# Patient Record
Sex: FEMALE | Race: WHITE | Employment: FULL TIME | URBAN - METROPOLITAN AREA
[De-identification: names, ages, dates, MRNs, and addresses within clinical notes are randomized per-mention and may not be internally consistent; named-entity substitution may affect disease eponyms.]

---

## 2020-10-22 ENCOUNTER — OFFICE VISIT (OUTPATIENT)
Dept: PRIMARY CARE CLINIC | Age: 60
End: 2020-10-22
Payer: COMMERCIAL

## 2020-10-22 VITALS
HEIGHT: 63 IN | DIASTOLIC BLOOD PRESSURE: 90 MMHG | SYSTOLIC BLOOD PRESSURE: 150 MMHG | TEMPERATURE: 98 F | WEIGHT: 204.4 LBS | HEART RATE: 97 BPM | BODY MASS INDEX: 36.21 KG/M2 | OXYGEN SATURATION: 98 %

## 2020-10-22 PROCEDURE — 99203 OFFICE O/P NEW LOW 30 MIN: CPT | Performed by: STUDENT IN AN ORGANIZED HEALTH CARE EDUCATION/TRAINING PROGRAM

## 2020-10-22 PROCEDURE — 90471 IMMUNIZATION ADMIN: CPT | Performed by: STUDENT IN AN ORGANIZED HEALTH CARE EDUCATION/TRAINING PROGRAM

## 2020-10-22 PROCEDURE — 90750 HZV VACC RECOMBINANT IM: CPT | Performed by: STUDENT IN AN ORGANIZED HEALTH CARE EDUCATION/TRAINING PROGRAM

## 2020-10-22 RX ORDER — LEVOTHYROXINE SODIUM 0.15 MG/1
150 TABLET ORAL DAILY
Qty: 90 TABLET | Refills: 1 | Status: SHIPPED | OUTPATIENT
Start: 2020-10-22 | End: 2020-11-23

## 2020-10-22 RX ORDER — TRAMADOL HYDROCHLORIDE 50 MG/1
50 TABLET ORAL EVERY 6 HOURS PRN
COMMUNITY
End: 2022-03-29

## 2020-10-22 RX ORDER — PSYLLIUM HUSK 0.4 G
1000 CAPSULE ORAL DAILY
COMMUNITY

## 2020-10-22 RX ORDER — SENNOSIDES 8.6 MG
4 TABLET ORAL PRN
COMMUNITY

## 2020-10-22 RX ORDER — GINKGO BILOBA 60 MG
2 TABLET ORAL 2 TIMES DAILY
COMMUNITY

## 2020-10-22 RX ORDER — GINKGO BILOBA LEAF EXTRACT 60 MG
CAPSULE ORAL
COMMUNITY

## 2020-10-22 RX ORDER — HYDRALAZINE HYDROCHLORIDE 25 MG/1
25 TABLET, FILM COATED ORAL 3 TIMES DAILY
COMMUNITY
Start: 2020-01-16 | End: 2021-01-25 | Stop reason: SDUPTHER

## 2020-10-22 RX ORDER — ALBUTEROL SULFATE 90 UG/1
2 AEROSOL, METERED RESPIRATORY (INHALATION) EVERY 4 HOURS PRN
COMMUNITY

## 2020-10-22 RX ORDER — NORTRIPTYLINE HYDROCHLORIDE 10 MG/1
10 CAPSULE ORAL NIGHTLY
COMMUNITY
Start: 2019-11-07 | End: 2021-06-25

## 2020-10-22 RX ORDER — MONTELUKAST SODIUM 10 MG/1
10 TABLET ORAL NIGHTLY
COMMUNITY
Start: 2019-11-07 | End: 2021-01-25 | Stop reason: SDUPTHER

## 2020-10-22 RX ORDER — DULOXETIN HYDROCHLORIDE 60 MG/1
60 CAPSULE, DELAYED RELEASE ORAL DAILY
COMMUNITY
End: 2020-10-26

## 2020-10-22 RX ORDER — FLUOXETINE HYDROCHLORIDE 40 MG/1
1 CAPSULE ORAL DAILY
COMMUNITY
End: 2020-11-22 | Stop reason: SDUPTHER

## 2020-10-22 RX ORDER — VALSARTAN 320 MG/1
320 TABLET ORAL DAILY
COMMUNITY
Start: 2019-11-07 | End: 2021-06-25

## 2020-10-22 RX ORDER — AMLODIPINE BESYLATE 10 MG/1
10 TABLET ORAL NIGHTLY
COMMUNITY
Start: 2019-11-07 | End: 2021-01-25 | Stop reason: SDUPTHER

## 2020-10-22 RX ORDER — IBUPROFEN 800 MG/1
800 TABLET ORAL EVERY 8 HOURS PRN
COMMUNITY
End: 2022-03-31

## 2020-10-22 RX ORDER — CYCLOBENZAPRINE HCL 10 MG
10 TABLET ORAL 3 TIMES DAILY PRN
COMMUNITY
End: 2022-09-23 | Stop reason: SDUPTHER

## 2020-10-22 RX ORDER — ESTRADIOL 1 MG/1
TABLET ORAL DAILY
COMMUNITY
End: 2021-09-13

## 2020-10-22 RX ORDER — HYDROXYCHLOROQUINE SULFATE 200 MG/1
TABLET, FILM COATED ORAL DAILY
COMMUNITY
End: 2020-10-26

## 2020-10-22 RX ORDER — GLUCOSAMINE SULFATE 500 MG
500 CAPSULE ORAL DAILY
COMMUNITY

## 2020-10-22 RX ORDER — LANOLIN ALCOHOL/MO/W.PET/CERES
3 CREAM (GRAM) TOPICAL NIGHTLY
COMMUNITY

## 2020-10-22 RX ORDER — LEVOTHYROXINE SODIUM 0.15 MG/1
150 TABLET ORAL
COMMUNITY
Start: 2019-11-07 | End: 2020-10-22

## 2020-10-22 RX ORDER — OMEPRAZOLE 40 MG/1
40 CAPSULE, DELAYED RELEASE ORAL
COMMUNITY
End: 2021-01-25 | Stop reason: SDUPTHER

## 2020-10-22 RX ORDER — LORATADINE 10 MG/1
10 TABLET ORAL DAILY
COMMUNITY

## 2020-10-22 RX ORDER — TERAZOSIN 5 MG/1
CAPSULE ORAL
COMMUNITY
Start: 2020-01-09 | End: 2021-09-13

## 2020-10-22 RX ORDER — CRANBERRY FRUIT EXTRACT 200 MG
CAPSULE ORAL
COMMUNITY

## 2020-10-22 RX ORDER — GABAPENTIN 300 MG/1
300 CAPSULE ORAL EVERY EVENING
COMMUNITY
End: 2021-01-25 | Stop reason: SDUPTHER

## 2020-10-22 SDOH — ECONOMIC STABILITY: TRANSPORTATION INSECURITY
IN THE PAST 12 MONTHS, HAS THE LACK OF TRANSPORTATION KEPT YOU FROM MEDICAL APPOINTMENTS OR FROM GETTING MEDICATIONS?: NO

## 2020-10-22 SDOH — HEALTH STABILITY: MENTAL HEALTH: HOW OFTEN DO YOU HAVE A DRINK CONTAINING ALCOHOL?: NOT ASKED

## 2020-10-22 SDOH — HEALTH STABILITY: MENTAL HEALTH: HOW MANY STANDARD DRINKS CONTAINING ALCOHOL DO YOU HAVE ON A TYPICAL DAY?: 1 OR 2

## 2020-10-22 SDOH — ECONOMIC STABILITY: INCOME INSECURITY: HOW HARD IS IT FOR YOU TO PAY FOR THE VERY BASICS LIKE FOOD, HOUSING, MEDICAL CARE, AND HEATING?: NOT HARD AT ALL

## 2020-10-22 SDOH — ECONOMIC STABILITY: TRANSPORTATION INSECURITY
IN THE PAST 12 MONTHS, HAS LACK OF TRANSPORTATION KEPT YOU FROM MEETINGS, WORK, OR FROM GETTING THINGS NEEDED FOR DAILY LIVING?: NO

## 2020-10-22 SDOH — ECONOMIC STABILITY: FOOD INSECURITY: WITHIN THE PAST 12 MONTHS, YOU WORRIED THAT YOUR FOOD WOULD RUN OUT BEFORE YOU GOT MONEY TO BUY MORE.: NEVER TRUE

## 2020-10-22 SDOH — ECONOMIC STABILITY: FOOD INSECURITY: WITHIN THE PAST 12 MONTHS, THE FOOD YOU BOUGHT JUST DIDN'T LAST AND YOU DIDN'T HAVE MONEY TO GET MORE.: NEVER TRUE

## 2020-10-22 ASSESSMENT — PATIENT HEALTH QUESTIONNAIRE - PHQ9
1. LITTLE INTEREST OR PLEASURE IN DOING THINGS: 0
SUM OF ALL RESPONSES TO PHQ9 QUESTIONS 1 & 2: 0
SUM OF ALL RESPONSES TO PHQ QUESTIONS 1-9: 0
SUM OF ALL RESPONSES TO PHQ QUESTIONS 1-9: 0
2. FEELING DOWN, DEPRESSED OR HOPELESS: 0
SUM OF ALL RESPONSES TO PHQ QUESTIONS 1-9: 0

## 2020-10-22 NOTE — PROGRESS NOTES
10/22/2020    Andria Mukherjee (:  1960) is a 62 y.o. female, here for evaluation of the following medical concerns:    HPI    History of thyroid Cancer  Thyroid removed and on synthroid and needs refill  Dry skin, fatigue  TSH checked more than 6 months ago. Synthroid 150mg daily  Denies any neck pain  No constipation/diarrhea  No palpitations    HTN  norvasc 10mg daily  vaalsartan 320mg evening  Hydralazine 25 mgTID  Denies any chest pain, shortness of breath headaches, vision changes, lower extremity edema, orthopnea, or dyspnea on exertion    Fibromyalgia  stable  Tramadol, flexeril  Hydralazine 25 mg day  Has tried Cymbalta 60mg daily not taking anymore    Menopause   Estrace 1 mg daily    Hysterectomy   Just got flu vaccine, already had uearly mamomgram and colonospkcy and all normal     Denies any smoking, drug use, alcohol use  Denies any significant past medical history other than what is listed above, no significant family history, no other surgeries    Review of Systems   Constitutional: Negative for chills and fever. HENT: Negative for congestion, rhinorrhea and sore throat. Eyes: Negative for visual disturbance. Respiratory: Negative for cough and shortness of breath. Cardiovascular: Negative for chest pain. Gastrointestinal: Negative for diarrhea, nausea and vomiting. Genitourinary: Negative for dysuria. Skin: Negative for rash. Allergic/Immunologic: Negative for environmental allergies. Neurological: Negative for headaches. Prior to Visit Medications    Medication Sig Taking?  Authorizing Provider   albuterol sulfate HFA (PROAIR HFA) 108 (90 Base) MCG/ACT inhaler Inhale 2 puffs into the lungs every 4 hours as needed Yes Historical Provider, MD   amLODIPine (NORVASC) 10 MG tablet Take 10 mg by mouth nightly Yes Historical Provider, MD   [facility] 500-0.2 MG TABS Take by mouth Yes Historical Provider, MD   Calcium Carb-Cholecalciferol 500-600 MG-UNIT TABS Take by mouth every evening Yes Historical Provider, MD   vitamin D (VITAMIN D-1000 MAX ST) 25 MCG (1000 UT) TABS tablet Take 1,000 Units by mouth daily Yes Historical Provider, MD   Cranberry 200 MG CAPS Take by mouth Yes Historical Provider, MD   cyclobenzaprine (FLEXERIL) 10 MG tablet Take 10 mg by mouth 3 times daily as needed Yes Historical Provider, MD   estradiol (ESTRACE) 1 MG tablet daily Yes Historical Provider, MD   Ginkgo 60 MG TABS Take 2 tablets by mouth 2 times daily Yes Historical Provider, MD   Ginseng 100 MG CAPS Take by mouth Yes Historical Provider, MD   Glucosamine 500 MG CAPS Take 500 mg by mouth daily Yes Historical Provider, MD   hydrALAZINE (APRESOLINE) 25 MG tablet Take 25 mg by mouth 3 times daily Yes Historical Provider, MD   ibuprofen (ADVIL;MOTRIN) 800 MG tablet Take 800 mg by mouth every 8 hours as needed Yes Historical Provider, MD   loratadine (CLARITIN) 10 MG tablet Take 10 mg by mouth daily Yes Historical Provider, MD   melatonin 3 MG TABS tablet Take 3 mg by mouth nightly Yes Historical Provider, MD   montelukast (SINGULAIR) 10 MG tablet Take 10 mg by mouth nightly Yes Historical Provider, MD   omeprazole (PRILOSEC) 40 MG delayed release capsule Take 40 mg by mouth every morning (before breakfast) Yes Historical Provider, MD   senna (SENOKOT) 8.6 MG TABS tablet Take 4 tablets by mouth nightly Yes Historical Provider, MD   traMADol (ULTRAM) 50 MG tablet Take 50 mg by mouth every 6 hours as needed. Yes Historical Provider, MD   valsartan (DIOVAN) 320 MG tablet Take 320 mg by mouth daily Yes Historical Provider, MD   FLUoxetine (PROZAC) 40 MG capsule Take 1 tablet by mouth daily Yes Historical Provider, MD   gabapentin (NEURONTIN) 300 MG capsule Take 300 mg by mouth every evening.  Yes Historical Provider, MD   Flaxseed, Linseed, (FLAX SEED OIL PO) Take by mouth daily Yes Historical Provider, MD   levothyroxine (SYNTHROID) 150 MCG tablet Take 1 tablet by mouth daily Yes violence     Fear of current or ex partner: Not on file     Emotionally abused: Not on file     Physically abused: Not on file     Forced sexual activity: Not on file   Other Topics Concern    Not on file   Social History Narrative    Not on file        No family history on file. Vitals:    10/22/20 1520 10/22/20 1542   BP: (!) 142/90 (!) 150/90   Site: Right Upper Arm Right Upper Arm   Position: Sitting Sitting   Cuff Size: Large Adult Large Adult   Pulse: 97    Temp: 98 °F (36.7 °C) 98 °F (36.7 °C)   SpO2: 98%    Weight: 204 lb 6.4 oz (92.7 kg)    Height: 5' 2.5\" (1.588 m)      Estimated body mass index is 36.79 kg/m² as calculated from the following:    Height as of this encounter: 5' 2.5\" (1.588 m). Weight as of this encounter: 204 lb 6.4 oz (92.7 kg). Physical Exam  Vitals signs reviewed. Constitutional:       General: She is not in acute distress. Appearance: Normal appearance. She is not ill-appearing. HENT:      Head: Normocephalic and atraumatic. Right Ear: Tympanic membrane, ear canal and external ear normal.      Left Ear: Tympanic membrane, ear canal and external ear normal.      Mouth/Throat:      Mouth: Mucous membranes are moist.      Pharynx: Oropharynx is clear. No oropharyngeal exudate. Eyes:      General: No scleral icterus. Right eye: No discharge. Left eye: No discharge. Extraocular Movements: Extraocular movements intact. Conjunctiva/sclera: Conjunctivae normal.   Neck:      Musculoskeletal: Neck supple. No muscular tenderness. Cardiovascular:      Rate and Rhythm: Normal rate and regular rhythm. Pulses: Normal pulses. Heart sounds: Normal heart sounds. No murmur. No gallop. Pulmonary:      Effort: Pulmonary effort is normal.      Breath sounds: Normal breath sounds. No wheezing, rhonchi or rales. Abdominal:      General: Abdomen is flat. Bowel sounds are normal. There is no distension. Palpations: Abdomen is soft.  There is no mass. Musculoskeletal: Normal range of motion. Lymphadenopathy:      Cervical: No cervical adenopathy. Skin:     General: Skin is warm. Capillary Refill: Capillary refill takes less than 2 seconds. Neurological:      General: No focal deficit present. Mental Status: She is alert. Cranial Nerves: No cranial nerve deficit. Psychiatric:         Mood and Affect: Mood normal.         ASSESSMENT/PLAN:    41-year-old with history of thyroid cancer status post thyroidectomy presenting with some fatigue but otherwise stable. All other comorbidities stable as of right now no changes in medications. 1. Hx of thyroid cancer    - COMPREHENSIVE METABOLIC PANEL; Future  - CBC WITH AUTO DIFFERENTIAL; Future  - TSH WITH REFLEX TO FT4; Future  - Lipid Panel; Future  - HEPATITIS C ANTIBODY; Future    2. Need for shingles vaccine    - zoster recombinant adjuvanted vaccine [facility]) injection 50 mcg  - Zoster recombinant ([address] [place] [address] [address] [place])    3. Benign essential HTN      4. Menopausal hot flushes      5. Fibromyalgia      HM  Patient states she is up-to-date on Pap smear, mammograms and colon cancer screening    Return if symptoms worsen or fail to improve. An  electronic signature was used to authenticate this note.     --Frankie Handy MD on 10/26/2020 at 11:04 AM

## 2020-10-26 PROBLEM — N95.1 MENOPAUSAL HOT FLUSHES: Status: ACTIVE | Noted: 2020-10-26

## 2020-10-26 PROBLEM — Z85.850 HX OF THYROID CANCER: Status: ACTIVE | Noted: 2020-10-26

## 2020-10-26 PROBLEM — I10 BENIGN ESSENTIAL HTN: Status: ACTIVE | Noted: 2020-10-26

## 2020-10-26 PROBLEM — M79.7 FIBROMYALGIA: Status: ACTIVE | Noted: 2020-10-26

## 2020-10-26 ASSESSMENT — ENCOUNTER SYMPTOMS
SHORTNESS OF BREATH: 0
VOMITING: 0
COUGH: 0
RHINORRHEA: 0
SORE THROAT: 0
NAUSEA: 0
DIARRHEA: 0

## 2020-11-18 DIAGNOSIS — Z85.850 HX OF THYROID CANCER: ICD-10-CM

## 2020-11-18 LAB
A/G RATIO: 2.2 (ref 1.1–2.2)
ALBUMIN SERPL-MCNC: 4.7 G/DL (ref 3.4–5)
ALP BLD-CCNC: 113 U/L (ref 40–129)
ALT SERPL-CCNC: 53 U/L (ref 10–40)
ANION GAP SERPL CALCULATED.3IONS-SCNC: 11 MMOL/L (ref 3–16)
AST SERPL-CCNC: 43 U/L (ref 15–37)
BASOPHILS ABSOLUTE: 0 K/UL (ref 0–0.2)
BASOPHILS RELATIVE PERCENT: 0.8 %
BILIRUB SERPL-MCNC: 0.5 MG/DL (ref 0–1)
BUN BLDV-MCNC: 13 MG/DL (ref 7–20)
CALCIUM SERPL-MCNC: 9.5 MG/DL (ref 8.3–10.6)
CHLORIDE BLD-SCNC: 103 MMOL/L (ref 99–110)
CHOLESTEROL, TOTAL: 251 MG/DL (ref 0–199)
CO2: 29 MMOL/L (ref 21–32)
CREAT SERPL-MCNC: 0.6 MG/DL (ref 0.6–1.1)
EOSINOPHILS ABSOLUTE: 0.2 K/UL (ref 0–0.6)
EOSINOPHILS RELATIVE PERCENT: 3.9 %
GFR AFRICAN AMERICAN: >60
GFR NON-AFRICAN AMERICAN: >60
GLOBULIN: 2.1 G/DL
GLUCOSE BLD-MCNC: 116 MG/DL (ref 70–99)
HCT VFR BLD CALC: 42.8 % (ref 36–48)
HDLC SERPL-MCNC: 80 MG/DL (ref 40–60)
HEMOGLOBIN: 14 G/DL (ref 12–16)
HEPATITIS C ANTIBODY INTERPRETATION: NORMAL
LDL CHOLESTEROL CALCULATED: 153 MG/DL
LYMPHOCYTES ABSOLUTE: 1.7 K/UL (ref 1–5.1)
LYMPHOCYTES RELATIVE PERCENT: 32.6 %
MCH RBC QN AUTO: 30 PG (ref 26–34)
MCHC RBC AUTO-ENTMCNC: 32.6 G/DL (ref 31–36)
MCV RBC AUTO: 91.9 FL (ref 80–100)
MONOCYTES ABSOLUTE: 0.3 K/UL (ref 0–1.3)
MONOCYTES RELATIVE PERCENT: 6.1 %
NEUTROPHILS ABSOLUTE: 2.9 K/UL (ref 1.7–7.7)
NEUTROPHILS RELATIVE PERCENT: 56.6 %
PDW BLD-RTO: 13.5 % (ref 12.4–15.4)
PLATELET # BLD: 327 K/UL (ref 135–450)
PMV BLD AUTO: 9.1 FL (ref 5–10.5)
POTASSIUM SERPL-SCNC: 4.7 MMOL/L (ref 3.5–5.1)
RBC # BLD: 4.66 M/UL (ref 4–5.2)
SODIUM BLD-SCNC: 143 MMOL/L (ref 136–145)
T4 FREE: 1.1 NG/DL (ref 0.9–1.8)
TOTAL PROTEIN: 6.8 G/DL (ref 6.4–8.2)
TRIGL SERPL-MCNC: 92 MG/DL (ref 0–150)
TSH REFLEX FT4: 8.53 UIU/ML (ref 0.27–4.2)
VLDLC SERPL CALC-MCNC: 18 MG/DL
WBC # BLD: 5.1 K/UL (ref 4–11)

## 2020-11-19 NOTE — TELEPHONE ENCOUNTER
Medication:   Requested Prescriptions     Pending Prescriptions Disp Refills    FLUoxetine (PROZAC) 40 MG capsule 30 capsule 2     Sig: Take by mouth daily        Last Filled:      Patient Phone Number: [phone] (home)     Last appt: 10/22/2020   Next appt: Visit date not found    Last OARRS: No flowsheet data found. Preferred Pharmacy:   [facility] [address], [address]  [address] [place] [address]  Phone: [phone] Fax: [phone]    [facility], [address] [facility] [address]. Yesica Marco [phone] - F [phone]  [address] [address]  Phone: [phone] Fax: [phone]

## 2020-11-19 NOTE — TELEPHONE ENCOUNTER
Pt needs a refill for Prozach 40 MA     [facility] [address], [address] - F [phone]    And the pt was trying to speak to Dr. Christina Granda or her assistance, but couldn't find any of them, please call her back

## 2020-11-22 RX ORDER — FLUOXETINE HYDROCHLORIDE 40 MG/1
40 CAPSULE ORAL DAILY
Qty: 90 CAPSULE | Refills: 2 | Status: SHIPPED | OUTPATIENT
Start: 2020-11-22 | End: 2021-01-26 | Stop reason: SDUPTHER

## 2020-11-23 RX ORDER — LEVOTHYROXINE SODIUM 175 UG/1
175 TABLET ORAL DAILY
Qty: 90 TABLET | Refills: 1 | Status: SHIPPED | OUTPATIENT
Start: 2020-11-23 | End: 2021-01-26 | Stop reason: SDUPTHER

## 2021-01-25 NOTE — TELEPHONE ENCOUNTER
Sherry Dill from [address] [place] [address] called and is requesting Rxs below:    levothyroxine (SYNTHROID) 175 MCG tablet    FLUoxetine (PROZAC) 40 MG capsule    amLODIPine (NORVASC) 10 MG tablet    Medication  estradiol (ESTRACE) 1 MG tablet [9967]  estradiol (ESTRACE) 1 MG tablet    montelukast (SINGULAIR) 10 MG tablet    omeprazole (PRILOSEC) 40 MG delayed release capsule     hydrALAZINE (APRESOLINE) 25 MG tablet    gabapentin (NEURONTIN) 300 MG capsule    valsartan (DIOVAN) 320 MG tablet    [address] [facility]  [phone]

## 2021-01-26 RX ORDER — AMLODIPINE BESYLATE 10 MG/1
10 TABLET ORAL NIGHTLY
Qty: 30 TABLET | Refills: 3 | Status: SHIPPED | OUTPATIENT
Start: 2021-01-26 | End: 2021-03-07 | Stop reason: SDUPTHER

## 2021-01-26 RX ORDER — ESTRADIOL 1 MG/1
1 TABLET ORAL DAILY
Qty: 21 TABLET | Refills: 0 | OUTPATIENT
Start: 2021-01-26

## 2021-01-26 RX ORDER — OMEPRAZOLE 40 MG/1
40 CAPSULE, DELAYED RELEASE ORAL
Qty: 30 CAPSULE | Refills: 3 | Status: SHIPPED | OUTPATIENT
Start: 2021-01-26 | End: 2021-03-07 | Stop reason: SDUPTHER

## 2021-01-26 RX ORDER — GABAPENTIN 300 MG/1
300 CAPSULE ORAL EVERY EVENING
Qty: 90 CAPSULE | Refills: 0 | Status: SHIPPED | OUTPATIENT
Start: 2021-01-26 | End: 2021-10-25

## 2021-01-26 RX ORDER — LEVOTHYROXINE SODIUM 175 UG/1
175 TABLET ORAL DAILY
Qty: 90 TABLET | Refills: 1 | Status: SHIPPED | OUTPATIENT
Start: 2021-01-26 | End: 2021-04-17 | Stop reason: SDUPTHER

## 2021-01-26 RX ORDER — FLUOXETINE HYDROCHLORIDE 40 MG/1
40 CAPSULE ORAL DAILY
Qty: 90 CAPSULE | Refills: 2 | Status: SHIPPED | OUTPATIENT
Start: 2021-01-26 | End: 2021-10-25

## 2021-01-26 RX ORDER — HYDRALAZINE HYDROCHLORIDE 25 MG/1
25 TABLET, FILM COATED ORAL 3 TIMES DAILY
Qty: 90 TABLET | Refills: 3 | Status: SHIPPED | OUTPATIENT
Start: 2021-01-26 | End: 2021-06-25

## 2021-01-26 RX ORDER — MONTELUKAST SODIUM 10 MG/1
10 TABLET ORAL NIGHTLY
Qty: 30 TABLET | Refills: 3 | Status: SHIPPED | OUTPATIENT
Start: 2021-01-26 | End: 2021-03-07 | Stop reason: SDUPTHER

## 2021-01-26 NOTE — TELEPHONE ENCOUNTER
She is on estrace, unsure how long she has been on this, she has had hysterectomy. But if she has been on this for 5 years or more then would recommend coming of it.

## 2021-03-08 RX ORDER — AMLODIPINE BESYLATE 10 MG/1
10 TABLET ORAL NIGHTLY
Qty: 30 TABLET | Refills: 3 | Status: SHIPPED | OUTPATIENT
Start: 2021-03-08 | End: 2021-04-17 | Stop reason: SDUPTHER

## 2021-03-08 RX ORDER — OMEPRAZOLE 40 MG/1
40 CAPSULE, DELAYED RELEASE ORAL
Qty: 30 CAPSULE | Refills: 3 | Status: SHIPPED | OUTPATIENT
Start: 2021-03-08 | End: 2021-04-17 | Stop reason: SDUPTHER

## 2021-03-08 RX ORDER — MONTELUKAST SODIUM 10 MG/1
10 TABLET ORAL NIGHTLY
Qty: 30 TABLET | Refills: 3 | Status: SHIPPED | OUTPATIENT
Start: 2021-03-08 | End: 2021-04-17 | Stop reason: SDUPTHER

## 2021-03-08 NOTE — TELEPHONE ENCOUNTER
Medication:   Requested Prescriptions     Pending Prescriptions Disp Refills    amLODIPine (NORVASC) 10 MG tablet 30 tablet 3     Sig: Take 1 tablet by mouth nightly    montelukast (SINGULAIR) 10 MG tablet 30 tablet 3     Sig: Take 1 tablet by mouth nightly    omeprazole (PRILOSEC) 40 MG delayed release capsule 30 capsule 3     Sig: Take 1 capsule by mouth every morning (before breakfast)     Last Filled:  01/26/21    Last appt: 10/22/2020   Next appt: Visit date not found    Last OARRS: No flowsheet data found.

## 2021-04-19 RX ORDER — OMEPRAZOLE 40 MG/1
40 CAPSULE, DELAYED RELEASE ORAL
Qty: 30 CAPSULE | Refills: 3 | Status: SHIPPED | OUTPATIENT
Start: 2021-04-19 | End: 2021-10-25

## 2021-04-19 RX ORDER — LEVOTHYROXINE SODIUM 175 UG/1
175 TABLET ORAL DAILY
Qty: 90 TABLET | Refills: 1 | Status: SHIPPED | OUTPATIENT
Start: 2021-04-19 | End: 2021-09-13 | Stop reason: SDUPTHER

## 2021-04-19 RX ORDER — AMLODIPINE BESYLATE 10 MG/1
10 TABLET ORAL NIGHTLY
Qty: 30 TABLET | Refills: 3 | Status: SHIPPED | OUTPATIENT
Start: 2021-04-19 | End: 2021-07-27

## 2021-04-19 RX ORDER — MONTELUKAST SODIUM 10 MG/1
10 TABLET ORAL NIGHTLY
Qty: 30 TABLET | Refills: 3 | Status: SHIPPED | OUTPATIENT
Start: 2021-04-19 | End: 2021-07-27

## 2021-04-19 NOTE — TELEPHONE ENCOUNTER
Medication:   Requested Prescriptions     Pending Prescriptions Disp Refills    amLODIPine (NORVASC) 10 MG tablet 30 tablet 3     Sig: Take 1 tablet by mouth nightly     Last Filled:  3.8.21    Last appt: 10/22/2020   Next appt: 4/17/2021    Last OARRS: No flowsheet data found.

## 2021-04-19 NOTE — TELEPHONE ENCOUNTER
Medication:   Requested Prescriptions     Pending Prescriptions Disp Refills    omeprazole (PRILOSEC) 40 MG delayed release capsule 30 capsule 3     Sig: Take 1 capsule by mouth every morning (before breakfast)     Last Filled:  3.8.21    Last appt: 10/22/2020   Next appt: 4/17/2021    Last OARRS: No flowsheet data found.

## 2021-04-19 NOTE — TELEPHONE ENCOUNTER
Medication:   Requested Prescriptions     Pending Prescriptions Disp Refills    montelukast (SINGULAIR) 10 MG tablet 30 tablet 3     Sig: Take 1 tablet by mouth nightly     Last Filled:  3.8.21    Last appt: 10/22/2020   Next appt: 4/17/2021    Last OARRS: No flowsheet data found.

## 2021-04-19 NOTE — TELEPHONE ENCOUNTER
Medication:   Requested Prescriptions     Pending Prescriptions Disp Refills    levothyroxine (SYNTHROID) 175 MCG tablet 90 tablet 1     Sig: Take 1 tablet by mouth daily     Last Filled:  1.26.21    Last appt: 10/22/2020   Next appt: Visit date not found    Last Thyroid:   Lab Results   Component Value Date    T4FREE 1.1 11/18/2020

## 2021-06-07 ENCOUNTER — OFFICE VISIT (OUTPATIENT)
Dept: ENDOCRINOLOGY | Age: 61
End: 2021-06-07
Payer: COMMERCIAL

## 2021-06-07 VITALS
HEART RATE: 78 BPM | BODY MASS INDEX: 33.29 KG/M2 | OXYGEN SATURATION: 98 % | WEIGHT: 195 LBS | HEIGHT: 64 IN | DIASTOLIC BLOOD PRESSURE: 103 MMHG | SYSTOLIC BLOOD PRESSURE: 156 MMHG

## 2021-06-07 DIAGNOSIS — E66.9 CLASS 1 OBESITY WITH BODY MASS INDEX (BMI) OF 33.0 TO 33.9 IN ADULT, UNSPECIFIED OBESITY TYPE, UNSPECIFIED WHETHER SERIOUS COMORBIDITY PRESENT: ICD-10-CM

## 2021-06-07 DIAGNOSIS — E89.0 HYPOTHYROIDISM, POSTSURGICAL: ICD-10-CM

## 2021-06-07 DIAGNOSIS — C73 THYROID CANCER (HCC): ICD-10-CM

## 2021-06-07 PROBLEM — E66.811 CLASS 1 OBESITY WITH BODY MASS INDEX (BMI) OF 33.0 TO 33.9 IN ADULT: Status: ACTIVE | Noted: 2021-06-07

## 2021-06-07 PROCEDURE — 99204 OFFICE O/P NEW MOD 45 MIN: CPT | Performed by: INTERNAL MEDICINE

## 2021-06-07 NOTE — PROGRESS NOTES
SUBJECTIVE:  Kaity Helm is a 62 y.o. female who is being evaluated for hypothyroidism. 1. Hypothyroidism, postsurgical  This started in 2015. Patient was diagnosed with thyroid cancer, postsurgical hypothyroidism. The problem has been unchanged. Previous thyroid studies include: TSH and free thyroxine. Patient started medication in 2015. Currently patient is on: levothyroxine. Misses  0 doses a month. Current complaints: denies fatigue, weight changes, heat/cold intolerance, bowel/skin changes or CVS symptoms  Lost 10 lbs intentionally  Surgeon Dr. Abigail Barrera. Not followed by him  No I-131 Tx      2. Thyroid cancer ([facility] [address]  No swelling or lump sensation in the neck area    2/13/2015 Operations/Procedures: Total thyroidectomy, central neck dissection 2/13/15    Doing well s/p total+CND for encapsulated FV PTC [id number] (0/2 LN)  LT4, TSH/Tg/renal today, GOVEA +/- but unlikely unless Tg elevated  Clinical History:    Pre-Operative Diagnosis: Follicular variant of papillary thyroid Ca, right    thyroid nodule as [facility] FNA cytology [id number].    Post-Operative Diagnosis:     Same    Specimen(s) Submitted:   A.  Total Thyroid; B. Right Central Node Dissection        CPT Code(s):   98100 X 2    Office Info:   [id number]        FINAL DIAGNOSIS:        A.  Thyroid, total thyroidectomy:    - Papillary carcinoma, right lobe, completely excised; see synoptic report.        B.  Right central node dissection:    - Two lymph nodes negative for metastatic carcinoma; see synoptic report.        SYNOPTIC REPORT    THYROID GLAND: Resection        Procedure:  Total thyroidectomy        Lymph Node Sampling:  Right central node dissection        Tumor Focality:  Unifocal        Tumor Laterality:  Right lobe        Tumor Size:  Greatest dimension: 1.4 cm (gross examination)        Histologic Type:  Papillary carcinoma, follicular variant, encapsulated,    without capsular invasion        Margins:  Margins uninvolved by mouth every morning (before breakfast) 30 capsule 3    montelukast (SINGULAIR) 10 MG tablet Take 1 tablet by mouth nightly 30 tablet 3    levothyroxine (SYNTHROID) 175 MCG tablet Take 1 tablet by mouth daily 90 tablet 1    FLUoxetine (PROZAC) 40 MG capsule Take 1 capsule by mouth daily 90 capsule 2    gabapentin (NEURONTIN) 300 MG capsule Take 1 capsule by mouth every evening for 90 days. 90 capsule 0    hydrALAZINE (APRESOLINE) 25 MG tablet Take 1 tablet by mouth 3 times daily 90 tablet 3    albuterol sulfate HFA (PROAIR HFA) 108 (90 Base) MCG/ACT inhaler Inhale 2 puffs into the lungs every 4 hours as needed      Brindall Asencio-Chromium Kassandra 500-0.2 MG TABS Take by mouth      Calcium Carb-Cholecalciferol 500-600 MG-UNIT TABS Take by mouth every evening      vitamin D (VITAMIN D-1000 MAX ST) 25 MCG (1000 UT) TABS tablet Take 1,000 Units by mouth daily      Cranberry 200 MG CAPS Take by mouth      cyclobenzaprine (FLEXERIL) 10 MG tablet Take 10 mg by mouth 3 times daily as needed      estradiol (ESTRACE) 1 MG tablet daily      Ginkgo 60 MG TABS Take 2 tablets by mouth 2 times daily      Ginseng 100 MG CAPS Take by mouth      Glucosamine 500 MG CAPS Take 500 mg by mouth daily      ibuprofen (ADVIL;MOTRIN) 800 MG tablet Take 800 mg by mouth every 8 hours as needed      loratadine (CLARITIN) 10 MG tablet Take 10 mg by mouth daily      melatonin 3 MG TABS tablet Take 3 mg by mouth nightly      nortriptyline (PAMELOR) 10 MG capsule Take 10 mg by mouth nightly      senna (SENOKOT) 8.6 MG TABS tablet Take 4 tablets by mouth nightly      terazosin (HYTRIN) 5 MG capsule       traMADol (ULTRAM) 50 MG tablet Take 50 mg by mouth every 6 hours as needed.  valsartan (DIOVAN) 320 MG tablet Take 320 mg by mouth daily      Flaxseed, Linseed, (FLAX SEED OIL PO) Take by mouth daily       No current facility-administered medications for this visit.      Allergies   Allergen Reactions    Zofran [Ondansetron] elongated qt wave    Naproxen      weakness    Penicillins      Unknown     Demerol Hcl [Meperidine] Rash     No family status information on file.        Review of Systems:  Constitutional: no fatigue, no fever, no recent weight gain, has intentional recent weight loss, no changes in appetite  Eyes: no eye pain, no change in vision, no eye redness, no eye irritation, no double vision  Ears, nose, throat: no nasal congestion, no sore throat, no earache, no decrease in hearing, no hoarseness, no dry mouth, no sinus problems, no difficulty swallowing, no neck lumps, no dental problems, no mouth sores, no ringing in ears  Pulmonary: has shortness of breath, no wheezing, has dyspnea on exertion, no cough  Cardiovascular: no chest pain, has lower extremity edema, no orthopnea, no intermittent leg claudication, has palpitations  Gastrointestinal: no abdominal pain, no nausea, no vomiting, no diarrhea, no constipation, no dysphagia, no heartburn, no bloating  Genitourinary: no dysuria, no urinary incontinence, no urinary hesitancy, no urinary frequency, no feelings of urinary urgency, no nocturia  Musculoskeletal: has joint swelling, has joint stiffness, has joint pain, has muscle cramps, has muscle pain  Integument/Breast: no hair loss, no skin rashes, no skin lesions, no itching, has dry skin  Neurological: no numbness, no tingling, no weakness, no confusion, no headaches, no dizziness, no fainting, no tremors, no decrease in memory, no balance problems  Psychiatric: no anxiety, no depression, no insomnia  Hematologic/Lymphatic: no tendency for easy bleeding, no swollen lymph nodes, no tendency for easy bruising  Immunology: has seasonal allergies, no frequent infections, no frequent illnesses  Endocrine: has temperature intolerance    BP (!) 156/103   Pulse 78   Ht 5' 4\" (1.626 m)   Wt 195 lb (88.5 kg)   SpO2 98%   BMI 33.47 kg/m²    Wt Readings from Last 3 Encounters:   06/07/21 195 lb (88.5 kg)   10/22/20 204 lb 6.4 oz (92.7 kg)   01/14/14 200 lb (90.7 kg)     Body mass index is 33.47 kg/m².     OBJECTIVE:  Constitutional: no acute distress, well appearing and well nourished  Psychiatric: oriented to person, place and time, judgement and insight and normal, recent and remote memory and intact and mood and affect are normal  Skin: skin and subcutaneous tissue is normal without mass, normal turgor  Head and Face: examination of head and face revealed no abnormalities  Eyes: no lid or conjunctival swelling, erythema or discharge, pupils are normal, equal, round, reactive to light  Ears/Nose: external inspection of ears and nose revealed no abnormalities, hearing is grossly normal  Oropharynx/Mouth/Face: lips, tongue and gums are normal with no lesions, the voice quality was normal  Neck: neck is supple and symmetric, with midline trachea and no masses, thyroid is not palpable  Lymphatics: normal cervical lymph nodes, normal supraclavicular nodes  Pulmonary: no increased work of breathing or signs of respiratory distress, lungs are clear to auscultation  Cardiovascular: normal heart rate and rhythm, normal S1 and S2, no murmurs and pedal pulses and 2+ bilaterally, No edema  Abdomen: abdomen is soft, non-tender with no masses  Musculoskeletal: normal gait and station and exam of the digits and nails are normal  Neurological: normal coordination and normal general cortical function      Lab Review:    Lab Results   Component Value Date    WBC 5.1 11/18/2020    HGB 14.0 11/18/2020    HCT 42.8 11/18/2020    MCV 91.9 11/18/2020     11/18/2020     Lab Results   Component Value Date     11/18/2020    K 4.7 11/18/2020     11/18/2020    CO2 29 11/18/2020    BUN 13 11/18/2020    CREATININE 0.6 11/18/2020    GLUCOSE 116 11/18/2020    CALCIUM 9.5 11/18/2020    PROT 6.8 11/18/2020    LABALBU 4.7 11/18/2020    BILITOT 0.5 11/18/2020    ALKPHOS 113 11/18/2020    AST 43 11/18/2020    ALT 53 11/18/2020    LABGLOM >60 11/18/2020    GFRAA >60 11/18/2020    AGRATIO 2.2 11/18/2020    GLOB 2.1 11/18/2020     Lab Results   Component Value Date    TSHFT4 8.53 11/18/2020     No results found for: LABA1C  No results found for: EAG  Lab Results   Component Value Date    CHOL 251 11/18/2020     Lab Results   Component Value Date    TRIG 92 11/18/2020     Lab Results   Component Value Date    HDL 80 11/18/2020     Lab Results   Component Value Date    LDLCALC 153 11/18/2020     Lab Results   Component Value Date    LABVLDL 18 11/18/2020     No results found for: CHOLHDLRATIO  No results found for: LABMICR, [id number]  No results found for: VITD25     ASSESSMENT/PLAN:  1. Hypothyroidism, postsurgical  Obtain lab work, then reevaluate  Call for results  Adjust medication dose  Continue levothyroxine 0.17 mg daily  - T3, Free; Future  - T4, Free; Future  - TSH without Reflex; Future  - T3, Free; Future  - T4, Free; Future  - TSH without Reflex; Future    2. Thyroid cancer ([facility] [address]  Obtain lab work, then reevaluate  [id number]  Largest tumor diameter 1.4 cm, encapsulated, follicular variant of papillary thyroid carcinoma  - T3, Free; Future  - T4, Free; Future  - TSH without Reflex; Future  - Anti-Thyroglobulin Antibody; Future  - Thyroglobulin; Future  - T3, Free; Future  - T4, Free; Future  - Anti-Thyroglobulin Antibody; Future  - TSH without Reflex; Future  - Thyroglobulin; Future    3.  Class 1 obesity with body mass index (BMI) of 33.0 to 33.9 in adult, unspecified obesity type, unspecified whether serious comorbidity present  Diet, exercise    Reviewed and/or ordered clinical lab results Yes  Reviewed and/or ordered radiology tests Yes   Reviewed and/or ordered other diagnostic tests No  Discussed test results with performing physician No  Independently reviewed image, tracing, or specimen No  Made a decision to obtain old records No  Reviewed and summarized old records Yes   TSH 0.17-0.04-13.53-4.4-13.11-8.53   Ref Range & Units 5 yr ago Thyroglobulin 1.6 - 59.9 ng/mL <0.2Low         Thyroglobulin Ab 0.0 - 39.0 IU/mL <20.0        Obtained history from other than patient No    Andria Yi was counseled regarding symptoms of thyroid cancer, hypothyroidism diagnosis, course and complications of disease if inadequately treated, side effects of medications, diagnosis, treatment options, and prognosis, risks, benefits, complications, and alternatives of treatment, labs, imaging and other studies and treatment targets and goals, target TSH and thyroid cancer treatment. She understands instructions and counseling. Total time I spent for this encounter 45 minutes    Return in about 3 months (around 9/7/2021) for thyroid problems.     Electronically signed by Ruben Barrientos MD on 6/13/2021 at 5:02 PM

## 2021-06-25 ENCOUNTER — OFFICE VISIT (OUTPATIENT)
Dept: CARDIOLOGY CLINIC | Age: 61
End: 2021-06-25
Payer: COMMERCIAL

## 2021-06-25 VITALS
DIASTOLIC BLOOD PRESSURE: 76 MMHG | BODY MASS INDEX: 33.55 KG/M2 | SYSTOLIC BLOOD PRESSURE: 130 MMHG | WEIGHT: 196.5 LBS | HEIGHT: 64 IN | HEART RATE: 73 BPM

## 2021-06-25 DIAGNOSIS — R07.9 CHEST PAIN, UNSPECIFIED TYPE: ICD-10-CM

## 2021-06-25 DIAGNOSIS — R60.9 EDEMA, UNSPECIFIED TYPE: ICD-10-CM

## 2021-06-25 DIAGNOSIS — R06.02 SOB (SHORTNESS OF BREATH): Primary | ICD-10-CM

## 2021-06-25 DIAGNOSIS — E89.0 HYPOTHYROIDISM, POSTSURGICAL: ICD-10-CM

## 2021-06-25 DIAGNOSIS — R00.2 PALPITATIONS: ICD-10-CM

## 2021-06-25 DIAGNOSIS — C73 THYROID CANCER (HCC): ICD-10-CM

## 2021-06-25 LAB
ANTI-THYROGLOB ABS: <10 IU/ML
T3 FREE: 3 PG/ML (ref 2.3–4.2)
T4 FREE: 1.5 NG/DL (ref 0.9–1.8)
TSH SERPL DL<=0.05 MIU/L-ACNC: 0.27 UIU/ML (ref 0.27–4.2)

## 2021-06-25 PROCEDURE — 93000 ELECTROCARDIOGRAM COMPLETE: CPT | Performed by: INTERNAL MEDICINE

## 2021-06-25 PROCEDURE — 99204 OFFICE O/P NEW MOD 45 MIN: CPT | Performed by: INTERNAL MEDICINE

## 2021-06-25 NOTE — PROGRESS NOTES
62 y.o. here for SOB. Has been sob for months or longer. Has seen a pulmonologist for this and is on asthma medications. Sob is worse exertion, can't hike easily. Has palpitations. No n/v/LH. Has had increasing LE edema starting a few mo ago. Started taking \"over-the-counter\" diuretic, and it seemed to have worked. Did have some chest tightness as well. Tightness worse with exertion, lara steps. No syncope. The pain is pinpoint on the R side of sternum. Random. Usually lasts seconds. Goes away on it's own. Past Medical History:   Diagnosis Date    Carpal tunnel syndrome     Ganglion cyst     Thyroid cancer ([facility] [address] 2021     Social History     Tobacco Use    Smoking status: Former Smoker     Packs/day: 0.50     Years: 10.00     Pack years: 5.00     Quit date:      Years since quittin.5    Smokeless tobacco: Never Used   Substance Use Topics    Alcohol use: Yes     Alcohol/week: 14.0 standard drinks     Types: 14 Glasses of wine per week    Drug use: Never     Allergies   Allergen Reactions    Zofran [Ondansetron]      elongated qt wave    Naproxen      weakness    Penicillins      Unknown     Demerol Hcl [Meperidine] Rash     No family history on file. Review of Systems   General: No fevers, chills, fatigue, or night sweats. No abnormal changes in weight. HEENT: No blurry or decreased vision. No changes in hearing, nasal discharge or sore throat. Cardiovascular: See HPI. No cramping in legs or buttocks when walking. Respiratory: No cough, hemoptysis, or wheezing. No history of asthma. Gastrointestinal: No abdominal pain, hematochezia, melana, or history of GI ulcers. Genito-Urinary: No dysuria or hematuria. No urgency or polyuria. Musculoskeletal: No complaints of joint pain, joint swelling or muscular weakness/soreness. Neurological: No dizziness or headaches. No numbness/tingling, speech problems or weakness. No history of a stroke or TIA.    Psychological: No anxiety or depression  Hematological and Lymphatic: No abnormal bleeding or bruising, blood clots, jaundice. Endocrine: No malaise/lethargy, palpitations, polydipsia/polyuria, temperature intolerance or unexpected weight changes. Skin: No rashes or non-healing ulcers. PE:  Blood pressure 130/76, pulse 73, height 5' 4\" (1.626 m), weight 196 lb 8 oz (89.1 kg). General (appearance): Well devel. No distress  Eyes: Anicteric. EOMI. Neck: No JVD  Ears/Nose/Mouth/Thorat: No cyanosis  CV: RRR. No m/r/g   Respiratory:  Clear b, normal respiratory effort  GI: abd s/nt/nd  Skin: Warm, dry. No rashes  Neuro/Psych: Alert and oriented x 3. Appropriate behavior  Ext:  No c/c. No edema  Pulses:  2+ carotid    Lab Results   Component Value Date    WBC 5.1 11/18/2020    HGB 14.0 11/18/2020    HCT 42.8 11/18/2020    MCV 91.9 11/18/2020     11/18/2020     Lab Results   Component Value Date     11/18/2020    K 4.7 11/18/2020     11/18/2020    CO2 29 11/18/2020    BUN 13 11/18/2020    CREATININE 0.6 11/18/2020    GLUCOSE 116 (H) 11/18/2020    CALCIUM 9.5 11/18/2020    PROT 6.8 11/18/2020    LABALBU 4.7 11/18/2020    BILITOT 0.5 11/18/2020    ALKPHOS 113 11/18/2020    AST 43 (H) 11/18/2020    ALT 53 (H) 11/18/2020    LABGLOM >60 11/18/2020    GFRAA >60 11/18/2020    AGRATIO 2.2 11/18/2020    GLOB 2.1 11/18/2020     No results found for: INR, PROTIME    Lab Results   Component Value Date    CHOL 251 (H) 11/18/2020     Lab Results   Component Value Date    TRIG 92 11/18/2020     Lab Results   Component Value Date    HDL 80 (H) 11/18/2020     Lab Results   Component Value Date    LDLCALC 153 (H) 11/18/2020     Lab Results   Component Value Date    LABVLDL 18 11/18/2020     No results found for: CHOLHDLRATIO    6/25/2021 ECG: NSR    11/2019 Echo [facility]): EF 60-65%. Mild MAC. 2016 (from [facility] note):  Had R and LHC that showed normal coronaies and normal RHC       Current Outpatient Medications:   amLODIPine (NORVASC) 10 MG tablet, Take 1 tablet by mouth nightly, Disp: 30 tablet, Rfl: 3    omeprazole (PRILOSEC) 40 MG delayed release capsule, Take 1 capsule by mouth every morning (before breakfast), Disp: 30 capsule, Rfl: 3    montelukast (SINGULAIR) 10 MG tablet, Take 1 tablet by mouth nightly, Disp: 30 tablet, Rfl: 3    levothyroxine (SYNTHROID) 175 MCG tablet, Take 1 tablet by mouth daily, Disp: 90 tablet, Rfl: 1    FLUoxetine (PROZAC) 40 MG capsule, Take 1 capsule by mouth daily, Disp: 90 capsule, Rfl: 2    gabapentin (NEURONTIN) 300 MG capsule, Take 1 capsule by mouth every evening for 90 days. , Disp: 90 capsule, Rfl: 0    albuterol sulfate HFA (PROAIR HFA) 108 (90 Base) MCG/ACT inhaler, Inhale 2 puffs into the lungs every 4 hours as needed, Disp: , Rfl:     Brindall Asencio-Chromium Kassandra 500-0.2 MG TABS, Take by mouth, Disp: , Rfl:     Calcium Carb-Cholecalciferol 500-600 MG-UNIT TABS, Take by mouth every evening, Disp: , Rfl:     vitamin D (VITAMIN D-1000 MAX ST) 25 MCG (1000 UT) TABS tablet, Take 1,000 Units by mouth daily, Disp: , Rfl:     Cranberry 200 MG CAPS, Take by mouth, Disp: , Rfl:     cyclobenzaprine (FLEXERIL) 10 MG tablet, Take 10 mg by mouth 3 times daily as needed, Disp: , Rfl:     estradiol (ESTRACE) 1 MG tablet, daily, Disp: , Rfl:     Ginkgo 60 MG TABS, Take 2 tablets by mouth 2 times daily, Disp: , Rfl:     Ginseng 100 MG CAPS, Take by mouth, Disp: , Rfl:     Glucosamine 500 MG CAPS, Take 500 mg by mouth daily, Disp: , Rfl:     ibuprofen (ADVIL;MOTRIN) 800 MG tablet, Take 800 mg by mouth every 8 hours as needed, Disp: , Rfl:     loratadine (CLARITIN) 10 MG tablet, Take 10 mg by mouth daily, Disp: , Rfl:     melatonin 3 MG TABS tablet, Take 3 mg by mouth nightly, Disp: , Rfl:     senna (SENOKOT) 8.6 MG TABS tablet, Take 4 tablets by mouth nightly, Disp: , Rfl:     traMADol (ULTRAM) 50 MG tablet, Take 50 mg by mouth every 6 hours as needed. , Disp: , Rfl:    Flaxseed, Linseed, (FLAX SEED OIL PO), Take by mouth daily, Disp: , Rfl:     terazosin (HYTRIN) 5 MG capsule, , Disp: , Rfl:     The 10-year ASCVD risk score (Neetu Dunham, et al., 2013) is: 4.2%    Values used to calculate the score:      Age: 62 years      Sex: Female      Is Non- : No      Diabetic: No      Tobacco smoker: No      Systolic Blood Pressure: 188 mmHg      Is BP treated: Yes      HDL Cholesterol: 80 mg/dL      Total Cholesterol: 251 mg/dL    A/P:  62 y.o. here for COLEMAN and chest pain. 1. SOB (shortness of breath)    2. Chest pain, unspecified type    3. Edema, unspecified type    4. Palpitations      Recs:  -Echo  -Nuc stress  -F/U Ina in 3 mo  -If echo and stress are unrevealing and she still has sx, would get CTA coronaries. Already had a cath from a few years ago w/o disease.       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

## 2021-07-04 LAB — THYROGLOBULIN BY LC-MS/MS, SERUM/PLASMA: <0.5 NG/ML (ref 1.3–31.8)

## 2021-08-13 ENCOUNTER — HOSPITAL ENCOUNTER (OUTPATIENT)
Dept: MAMMOGRAPHY | Age: 61
Discharge: HOME | End: 2021-08-13
Payer: COMMERCIAL

## 2021-08-13 ENCOUNTER — APPOINTMENT (OUTPATIENT)
Dept: NON INVASIVE DIAGNOSTICS | Age: 61
End: 2021-08-13
Payer: COMMERCIAL

## 2021-08-13 VITALS — HEIGHT: 64 IN | WEIGHT: 195 LBS | BODY MASS INDEX: 33.29 KG/M2

## 2021-08-13 DIAGNOSIS — Z12.39 SCREENING BREAST EXAMINATION: ICD-10-CM

## 2021-08-13 PROCEDURE — 77063 BREAST TOMOSYNTHESIS BI: CPT

## 2021-09-13 ENCOUNTER — OFFICE VISIT (OUTPATIENT)
Dept: ENDOCRINOLOGY | Age: 61
End: 2021-09-13
Payer: COMMERCIAL

## 2021-09-13 VITALS
WEIGHT: 196 LBS | RESPIRATION RATE: 16 BRPM | HEIGHT: 64 IN | HEART RATE: 72 BPM | DIASTOLIC BLOOD PRESSURE: 86 MMHG | OXYGEN SATURATION: 97 % | BODY MASS INDEX: 33.46 KG/M2 | SYSTOLIC BLOOD PRESSURE: 142 MMHG | TEMPERATURE: 98 F

## 2021-09-13 DIAGNOSIS — E89.0 HYPOTHYROIDISM, POSTSURGICAL: Primary | ICD-10-CM

## 2021-09-13 DIAGNOSIS — Z78.0 MENOPAUSE: ICD-10-CM

## 2021-09-13 DIAGNOSIS — E66.9 CLASS 1 OBESITY WITH BODY MASS INDEX (BMI) OF 33.0 TO 33.9 IN ADULT, UNSPECIFIED OBESITY TYPE, UNSPECIFIED WHETHER SERIOUS COMORBIDITY PRESENT: ICD-10-CM

## 2021-09-13 DIAGNOSIS — C73 THYROID CANCER (HCC): ICD-10-CM

## 2021-09-13 PROCEDURE — 99214 OFFICE O/P EST MOD 30 MIN: CPT | Performed by: INTERNAL MEDICINE

## 2021-09-13 RX ORDER — LEVOTHYROXINE SODIUM 175 UG/1
175 TABLET ORAL DAILY
Qty: 90 TABLET | Refills: 1 | Status: SHIPPED | OUTPATIENT
Start: 2021-09-13 | End: 2022-03-28

## 2021-09-13 RX ORDER — M-VIT,TX,IRON,MINS/CALC/FOLIC 27MG-0.4MG
1 TABLET ORAL DAILY
COMMUNITY

## 2021-09-13 RX ORDER — DIPHENHYDRAMINE HCL 25 MG
1 TABLET,DISINTEGRATING ORAL
COMMUNITY

## 2021-09-13 NOTE — PROGRESS NOTES
SUBJECTIVE:  Danis Paul is a 62 y.o. female who is being evaluated for hypothyroidism. 1. Hypothyroidism, postsurgical  This started in 2015. Patient was diagnosed with thyroid cancer, postsurgical hypothyroidism. The problem has been unchanged. Previous thyroid studies include: TSH and free thyroxine. Patient started medication in 2015. Currently patient is on: levothyroxine. Misses  0 doses a month. Current complaints: denies fatigue, weight changes, heat/cold intolerance, bowel/skin changes or CVS symptoms. Has nail changes, dry skin. Surgeon Dr. Lio Mccauley. Not followed by him  No I-131 Tx      2. Thyroid cancer ([facility] [address]  No swelling or lump sensation in the neck area    2/13/2015 Operations/Procedures: Total thyroidectomy, central neck dissection 2/13/15    Doing well s/p total+CND for encapsulated FV PTC [id number] (0/2 LN)  LT4, TSH/Tg/renal today, GOVEA +/- but unlikely unless Tg elevated  Clinical History:    Pre-Operative Diagnosis: Follicular variant of papillary thyroid Ca, right    thyroid nodule as [facility] FNA cytology [id number].    Post-Operative Diagnosis:     Same    Specimen(s) Submitted:   A.  Total Thyroid; B. Right Central Node Dissection        CPT Code(s):   31952 X 2    Office Info:   [id number]        FINAL DIAGNOSIS:        A.  Thyroid, total thyroidectomy:    - Papillary carcinoma, right lobe, completely excised; see synoptic report.        B.  Right central node dissection:    - Two lymph nodes negative for metastatic carcinoma; see synoptic report.        SYNOPTIC REPORT    THYROID GLAND: Resection        Procedure:  Total thyroidectomy        Lymph Node Sampling:  Right central node dissection        Tumor Focality:  Unifocal        Tumor Laterality:  Right lobe        Tumor Size:  Greatest dimension: 1.4 cm (gross examination)        Histologic Type:  Papillary carcinoma, follicular variant, encapsulated,    without capsular invasion        Margins:  Margins uninvolved by Allergen Reactions    Zofran [Ondansetron]      elongated qt wave    Naproxen      weakness    Penicillins      Unknown     Demerol Hcl [Meperidine] Rash    Sulfa Antibiotics Nausea And Vomiting     No family status information on file.        Review of Systems:  Constitutional: no fatigue, no fever, no recent weight gain, has intentional recent weight loss, no changes in appetite  Eyes: no eye pain, no change in vision, no eye redness, no eye irritation, no double vision  Ears, nose, throat: no nasal congestion, no sore throat, no earache, no decrease in hearing, no hoarseness, no dry mouth, no sinus problems, no difficulty swallowing, no neck lumps, no dental problems, no mouth sores, no ringing in ears  Pulmonary: has shortness of breath, no wheezing, has dyspnea on exertion, no cough  Cardiovascular: no chest pain, has lower extremity edema, no orthopnea, no intermittent leg claudication, has palpitations  Gastrointestinal: no abdominal pain, no nausea, no vomiting, no diarrhea, no constipation, no dysphagia, no heartburn, no bloating  Genitourinary: no dysuria, no urinary incontinence, no urinary hesitancy, no urinary frequency, no feelings of urinary urgency, no nocturia  Musculoskeletal: has joint swelling, has joint stiffness, has joint pain, has muscle cramps, has muscle pain  Integument/Breast: no hair loss, no skin rashes, no skin lesions, no itching, has dry skin  Neurological: no numbness, no tingling, no weakness, no confusion, no headaches, no dizziness, no fainting, no tremors, no decrease in memory, no balance problems  Psychiatric: no anxiety, no depression, no insomnia  Hematologic/Lymphatic: no tendency for easy bleeding, no swollen lymph nodes, no tendency for easy bruising  Immunology: has seasonal allergies, no frequent infections, no frequent illnesses  Endocrine: has temperature intolerance    BP (!) 142/86   Pulse 72   Temp 98 °F (36.7 °C)   Resp 16   Ht 5' 4\" (1.626 m)   Wt 196 lb (88.9 kg)   LMP  (LMP Unknown)   SpO2 97%   BMI 33.64 kg/m²    Wt Readings from Last 3 Encounters:   09/13/21 196 lb (88.9 kg)   08/13/21 195 lb (88.5 kg)   06/25/21 196 lb 8 oz (89.1 kg)     Body mass index is 33.64 kg/m².     OBJECTIVE:  Constitutional: no acute distress, well appearing and well nourished  Psychiatric: oriented to person, place and time, judgement and insight and normal, recent and remote memory and intact and mood and affect are normal  Skin: skin and subcutaneous tissue is normal without mass, normal turgor  Head and Face: examination of head and face revealed no abnormalities  Eyes: no lid or conjunctival swelling, erythema or discharge, pupils are normal, equal, round, reactive to light  Ears/Nose: external inspection of ears and nose revealed no abnormalities, hearing is grossly normal  Oropharynx/Mouth/Face: lips, tongue and gums are normal with no lesions, the voice quality was normal  Neck: neck is supple and symmetric, with midline trachea and no masses, thyroid is not palpable  Lymphatics: normal cervical lymph nodes, normal supraclavicular nodes  Pulmonary: no increased work of breathing or signs of respiratory distress, lungs are clear to auscultation  Cardiovascular: normal heart rate and rhythm, normal S1 and S2, no murmurs and pedal pulses and 2+ bilaterally, No edema  Abdomen: abdomen is soft, non-tender with no masses  Musculoskeletal: normal gait and station and exam of the digits and nails are normal  Neurological: normal coordination and normal general cortical function      Lab Review:    Lab Results   Component Value Date    WBC 5.1 11/18/2020    HGB 14.0 11/18/2020    HCT 42.8 11/18/2020    MCV 91.9 11/18/2020     11/18/2020     Lab Results   Component Value Date     11/18/2020    K 4.7 11/18/2020     11/18/2020    CO2 29 11/18/2020    BUN 13 11/18/2020    CREATININE 0.6 11/18/2020    GLUCOSE 116 11/18/2020    CALCIUM 9.5 11/18/2020    PROT 6.8 11/18/2020    LABALBU 4.7 11/18/2020    BILITOT 0.5 11/18/2020    ALKPHOS 113 11/18/2020    AST 43 11/18/2020    ALT 53 11/18/2020    LABGLOM >60 11/18/2020    GFRAA >60 11/18/2020    AGRATIO 2.2 11/18/2020    GLOB 2.1 11/18/2020     Lab Results   Component Value Date    TSHFT4 8.53 11/18/2020    TSH 0.15 09/08/2021    FT3 3.2 09/08/2021     No results found for: LABA1C  No results found for: EAG  Lab Results   Component Value Date    CHOL 251 11/18/2020     Lab Results   Component Value Date    TRIG 92 11/18/2020     Lab Results   Component Value Date    HDL 80 11/18/2020     Lab Results   Component Value Date    LDLCALC 153 11/18/2020     Lab Results   Component Value Date    LABVLDL 18 11/18/2020     No results found for: CHOLHDLRATIO  No results found for: LABMICR, [id number]  No results found for: VITD25     ASSESSMENT/PLAN:  1. Hypothyroidism, postsurgical  TSH 0.15  Continue levothyroxine 0.175 mg daily  Adjust the dose next dmitry if indicated  - T3, Free; Future  - T4, Free; Future  - TSH without Reflex; Future    2. Thyroid cancer (HCC)  Thyroglobulin antibody negative   Total thyroidectomy 2015  [id number]  Largest tumor diameter 1.4 cm, encapsulated, follicular variant of papillary thyroid carcinoma  - T3, Free; Future  - T4, Free; Future  - Anti-Thyroglobulin Antibody; Future  - TSH without Reflex; Future  - Thyroglobulin; Future    3. Class 1 obesity with body mass index (BMI) of 33.0 to 33.9 in adult, unspecified obesity type, unspecified whether serious comorbidity present  Diet, exercise    4.  Menopause  No family history of osteoporosis or hip fractures  No steroid use  No smoking  Had toe fracture        Reviewed and/or ordered clinical lab results Yes  Reviewed and/or ordered radiology tests Yes   Reviewed and/or ordered other diagnostic tests No  Discussed test results with performing physician No  Independently reviewed image, tracing, or specimen No  Made a decision to obtain old records No  Reviewed and summarized old records Yes   TSH 0.17-0.04-13.53-4.4-13.11-8.53   Ref Range & Units 5 yr ago   Thyroglobulin 1.6 - 59.9 ng/mL <0.2Low         Thyroglobulin Ab 0.0 - 39.0 IU/mL <20.0        Obtained history from other than patient No    Andrai J Gabriel Hankins was counseled regarding symptoms of thyroid cancer, hypothyroidism diagnosis, course and complications of disease if inadequately treated, side effects of medications, diagnosis, treatment options, and prognosis, risks, benefits, complications, and alternatives of treatment, labs, imaging and other studies and treatment targets and goals, target TSH and thyroid cancer treatment. She understands instructions and counseling. Total time I spent for this encounter 30 minutes    Return in about 4 months (around 1/13/2022) for thyroid problems.     Electronically signed by Gerardo Rubi MD on 9/13/2021 at 4:18 PM

## 2021-10-22 NOTE — TELEPHONE ENCOUNTER
Medication:   Requested Prescriptions     Pending Prescriptions Disp Refills    amLODIPine (NORVASC) 10 MG tablet [Pharmacy Med Name: AMLODIPINE BESYLATE 10MG TABS] 30 tablet 3     Sig: TAKE ONE TABLET BY MOUTH NIGHTLY    montelukast (SINGULAIR) 10 MG tablet [Pharmacy Med Name: MONTELUKAST SODIUM 10MG TABS] 30 tablet 3     Sig: TAKE ONE TABLET BY MOUTH NIGHTLY    omeprazole (PRILOSEC) 40 MG delayed release capsule [Pharmacy Med Name: OMEPRAZOLE 40MG CPDR] 30 capsule 3     Sig: TAKE ONE CAPSULE BY MOUTH EVERY MORNING BEFORE BREAKFAST    FLUoxetine (PROZAC) 40 MG capsule [Pharmacy Med Name: FLUOXETINE HCL 40MG CAPS] 90 capsule 2     Sig: TAKE 1 CAPSULE BY MOUTH ONE TIME A DAY    gabapentin (NEURONTIN) 300 MG capsule [Pharmacy Med Name: GABAPENTIN 300MG CAPS] 90 capsule 0     Sig: TAKE ONE CAPSULE BY MOUTH EVERY EVENING     Last Filled: 7.27.21  4.19.21 1.26.21    Last appt: 10/22/2020   Next appt: Visit date not found    Last OARRS: No flowsheet data found.

## 2021-10-25 RX ORDER — OMEPRAZOLE 40 MG/1
CAPSULE, DELAYED RELEASE ORAL
Qty: 30 CAPSULE | Refills: 0 | Status: SHIPPED | OUTPATIENT
Start: 2021-10-25 | End: 2021-12-06

## 2021-10-25 RX ORDER — GABAPENTIN 300 MG/1
CAPSULE ORAL
Qty: 30 CAPSULE | Refills: 0 | Status: SHIPPED | OUTPATIENT
Start: 2021-10-25 | End: 2022-03-31

## 2021-10-25 RX ORDER — AMLODIPINE BESYLATE 10 MG/1
TABLET ORAL
Qty: 30 TABLET | Refills: 0 | Status: SHIPPED | OUTPATIENT
Start: 2021-10-25 | End: 2021-12-06

## 2021-10-25 RX ORDER — MONTELUKAST SODIUM 10 MG/1
TABLET ORAL
Qty: 30 TABLET | Refills: 0 | Status: SHIPPED | OUTPATIENT
Start: 2021-10-25 | End: 2021-12-06

## 2021-10-25 RX ORDER — FLUOXETINE HYDROCHLORIDE 40 MG/1
CAPSULE ORAL
Qty: 30 CAPSULE | Refills: 0 | Status: SHIPPED | OUTPATIENT
Start: 2021-10-25 | End: 2021-12-06

## 2021-11-01 ENCOUNTER — ANESTHESIA EVENT (OUTPATIENT)
Dept: ENDOSCOPY | Age: 61
End: 2021-11-01
Payer: COMMERCIAL

## 2021-11-02 ENCOUNTER — ANESTHESIA (OUTPATIENT)
Dept: ENDOSCOPY | Age: 61
End: 2021-11-02
Payer: COMMERCIAL

## 2021-11-02 ENCOUNTER — HOSPITAL ENCOUNTER (OUTPATIENT)
Age: 61
Setting detail: OUTPATIENT SURGERY
Discharge: HOME | End: 2021-11-02
Attending: INTERNAL MEDICINE | Admitting: INTERNAL MEDICINE
Payer: COMMERCIAL

## 2021-11-02 VITALS
DIASTOLIC BLOOD PRESSURE: 83 MMHG | HEIGHT: 64 IN | RESPIRATION RATE: 16 BRPM | SYSTOLIC BLOOD PRESSURE: 156 MMHG | BODY MASS INDEX: 33.46 KG/M2 | TEMPERATURE: 98.2 F | WEIGHT: 196 LBS | HEART RATE: 69 BPM | OXYGEN SATURATION: 97 %

## 2021-11-02 VITALS — SYSTOLIC BLOOD PRESSURE: 112 MMHG | DIASTOLIC BLOOD PRESSURE: 71 MMHG | OXYGEN SATURATION: 99 %

## 2021-11-02 DIAGNOSIS — Z12.11 COLON CANCER SCREENING: ICD-10-CM

## 2021-11-02 PROCEDURE — 6360000002 HC RX W HCPCS: Performed by: NURSE ANESTHETIST, CERTIFIED REGISTERED

## 2021-11-02 PROCEDURE — 3609010200 HC COLONOSCOPY ABLATION TUMOR POLYP/OTHER LES: Performed by: INTERNAL MEDICINE

## 2021-11-02 PROCEDURE — 3700000001 HC ADD 15 MINUTES (ANESTHESIA): Performed by: INTERNAL MEDICINE

## 2021-11-02 PROCEDURE — 7100000011 HC PHASE II RECOVERY - ADDTL 15 MIN: Performed by: INTERNAL MEDICINE

## 2021-11-02 PROCEDURE — 3700000000 HC ANESTHESIA ATTENDED CARE: Performed by: INTERNAL MEDICINE

## 2021-11-02 PROCEDURE — 7100000010 HC PHASE II RECOVERY - FIRST 15 MIN: Performed by: INTERNAL MEDICINE

## 2021-11-02 PROCEDURE — 2580000003 HC RX 258: Performed by: ANESTHESIOLOGY

## 2021-11-02 PROCEDURE — 2720000010 HC SURG SUPPLY STERILE: Performed by: INTERNAL MEDICINE

## 2021-11-02 PROCEDURE — 2709999900 HC NON-CHARGEABLE SUPPLY: Performed by: INTERNAL MEDICINE

## 2021-11-02 PROCEDURE — 88305 TISSUE EXAM BY PATHOLOGIST: CPT

## 2021-11-02 RX ORDER — LIDOCAINE HYDROCHLORIDE 20 MG/ML
INJECTION, SOLUTION INTRAVENOUS PRN
Status: DISCONTINUED | OUTPATIENT
Start: 2021-11-02 | End: 2021-11-02 | Stop reason: SDUPTHER

## 2021-11-02 RX ORDER — PROPOFOL 10 MG/ML
INJECTION, EMULSION INTRAVENOUS PRN
Status: DISCONTINUED | OUTPATIENT
Start: 2021-11-02 | End: 2021-11-02 | Stop reason: SDUPTHER

## 2021-11-02 RX ORDER — PROPOFOL 10 MG/ML
INJECTION, EMULSION INTRAVENOUS CONTINUOUS PRN
Status: DISCONTINUED | OUTPATIENT
Start: 2021-11-02 | End: 2021-11-02 | Stop reason: SDUPTHER

## 2021-11-02 RX ORDER — SODIUM CHLORIDE, SODIUM LACTATE, POTASSIUM CHLORIDE, CALCIUM CHLORIDE 600; 310; 30; 20 MG/100ML; MG/100ML; MG/100ML; MG/100ML
INJECTION, SOLUTION INTRAVENOUS CONTINUOUS
Status: DISCONTINUED | OUTPATIENT
Start: 2021-11-02 | End: 2021-11-02 | Stop reason: HOSPADM

## 2021-11-02 RX ADMIN — PROPOFOL 25 MG: 10 INJECTION, EMULSION INTRAVENOUS at 07:35

## 2021-11-02 RX ADMIN — LIDOCAINE HYDROCHLORIDE 35 MG: 20 INJECTION, SOLUTION INTRAVENOUS at 07:42

## 2021-11-02 RX ADMIN — PROPOFOL 125 MCG/KG/MIN: 10 INJECTION, EMULSION INTRAVENOUS at 07:30

## 2021-11-02 RX ADMIN — LIDOCAINE HYDROCHLORIDE 15 MG: 20 INJECTION, SOLUTION INTRAVENOUS at 07:35

## 2021-11-02 RX ADMIN — LIDOCAINE HYDROCHLORIDE 50 MG: 20 INJECTION, SOLUTION INTRAVENOUS at 07:30

## 2021-11-02 RX ADMIN — PROPOFOL 50 MG: 10 INJECTION, EMULSION INTRAVENOUS at 07:42

## 2021-11-02 RX ADMIN — SODIUM CHLORIDE, POTASSIUM CHLORIDE, SODIUM LACTATE AND CALCIUM CHLORIDE: 600; 310; 30; 20 INJECTION, SOLUTION INTRAVENOUS at 07:14

## 2021-11-02 RX ADMIN — PROPOFOL 75 MG: 10 INJECTION, EMULSION INTRAVENOUS at 07:30

## 2021-11-02 ASSESSMENT — PULMONARY FUNCTION TESTS
PIF_VALUE: 1
PIF_VALUE: 0
PIF_VALUE: 1
PIF_VALUE: 1
PIF_VALUE: 0
PIF_VALUE: 1
PIF_VALUE: 0
PIF_VALUE: 1
PIF_VALUE: 0
PIF_VALUE: 1
PIF_VALUE: 0
PIF_VALUE: 1
PIF_VALUE: 0
PIF_VALUE: 1
PIF_VALUE: 1

## 2021-11-02 ASSESSMENT — PAIN - FUNCTIONAL ASSESSMENT: PAIN_FUNCTIONAL_ASSESSMENT: 0-10

## 2021-11-02 ASSESSMENT — PAIN SCALES - GENERAL
PAINLEVEL_OUTOF10: 0

## 2021-11-02 ASSESSMENT — ENCOUNTER SYMPTOMS: SHORTNESS OF BREATH: 1

## 2021-11-02 ASSESSMENT — LIFESTYLE VARIABLES: SMOKING_STATUS: 0

## 2021-11-02 NOTE — ANESTHESIA POSTPROCEDURE EVALUATION
Department of Anesthesiology  Postprocedure Note    Patient: Soo Coyle  MRN: [id number]  YOB: 1960  Date of evaluation: 11/2/2021  Time:  9:13 AM     Procedure Summary     Date: 11/02/21 Room / Location: [facility]    Anesthesia Start: 0725 Anesthesia Stop: 9077    Procedures:       COLONOSCOPY POLYPECTOMY ABLATION      COLONOSCOPY W/ ENDOSCOPIC MUCOSAL RESECTION Diagnosis:       Colon cancer screening      (Colon cancer screening [Z12.11])    Surgeons: Sherlyn Thornton MD Responsible Provider: Rosaline Leger MD    Anesthesia Type: MAC ASA Status: 2          Anesthesia Type: MAC    Mingo Phase I: Mingo Score: 10    Mingo Phase II: Mingo Score: 10    Last vitals: Reviewed and per EMR flowsheets.        Anesthesia Post Evaluation    Patient location during evaluation: bedside  Patient participation: complete - patient participated  Level of consciousness: awake  Pain score: 0  Airway patency: patent  Nausea & Vomiting: no nausea and no vomiting  Complications: no  Cardiovascular status: hemodynamically stable  Respiratory status: acceptable  Hydration status: euvolemic

## 2021-11-02 NOTE — ANESTHESIA PRE PROCEDURE
Department of Anesthesiology  Preprocedure Note       Name:  Jaskaran Welch   Age:  62 y.o.  :  1960                                          MRN:  [id number]         Date:  2021      Surgeon: La Rowe):  Trae Ho MD    Procedure: Procedure(s):  COLONOSCOPY    Medications prior to admission:   Prior to Admission medications    Medication Sig Start Date End Date Taking?  Authorizing Provider   amLODIPine (NORVASC) 10 MG tablet TAKE ONE TABLET BY MOUTH NIGHTLY 10/25/21   Codie Zavala MD   montelukast (SINGULAIR) 10 MG tablet TAKE ONE TABLET BY MOUTH NIGHTLY 10/25/21   Codie Zavala MD   omeprazole (PRILOSEC) 40 MG delayed release capsule TAKE ONE CAPSULE BY MOUTH EVERY MORNING BEFORE BREAKFAST 10/25/21   Codie Zavala MD   FLUoxetine (PROZAC) 40 MG capsule TAKE 1 CAPSULE BY MOUTH ONE TIME A DAY 10/25/21   Codie Zavala MD   gabapentin (NEURONTIN) 300 MG capsule TAKE ONE CAPSULE BY MOUTH EVERY EVENING 10/25/21 11/24/21  Codie Zavala MD   Misc Natural Products (GREEN TEA) TABS Take 1 tablet by mouth    Historical Provider, MD   Multiple Vitamins-Minerals (THERAPEUTIC MULTIVITAMIN-MINERALS) tablet Take 1 tablet by mouth daily    Historical Provider, MD   levothyroxine (SYNTHROID) 175 MCG tablet Take 1 tablet by mouth daily 21   Cosme Bright MD   albuterol sulfate HFA (PROAIR HFA) 108 (90 Base) MCG/ACT inhaler Inhale 2 puffs into the lungs every 4 hours as needed    Historical Provider, MD Vivi Asencio-Chromium Kassandra 500-0.2 MG TABS Take by mouth    Historical Provider, MD   Calcium Carb-Cholecalciferol 500-600 MG-UNIT TABS Take by mouth every evening    Historical Provider, MD   vitamin D (VITAMIN D-1000 MAX ST) 25 MCG (1000 UT) TABS tablet Take 1,000 Units by mouth daily    Historical Provider, MD   Cranberry 200 MG CAPS Take by mouth    Historical Provider, MD   cyclobenzaprine (FLEXERIL) 10 MG tablet Take 10 mg by mouth 3 times daily as needed    Historical Provider, MD Ginkgo 60 MG TABS Take 2 tablets by mouth 2 times daily    Historical Provider, MD   Ginseng 100 MG CAPS Take by mouth    Historical Provider, MD   Glucosamine 500 MG CAPS Take 500 mg by mouth daily    Historical Provider, MD   ibuprofen (ADVIL;MOTRIN) 800 MG tablet Take 800 mg by mouth every 8 hours as needed    Historical Provider, MD   loratadine (CLARITIN) 10 MG tablet Take 10 mg by mouth daily    Historical Provider, MD   melatonin 3 MG TABS tablet Take 3 mg by mouth nightly    Historical Provider, MD   senna (SENOKOT) 8.6 MG TABS tablet Take 4 tablets by mouth as needed     Historical Provider, MD   traMADol (ULTRAM) 50 MG tablet Take 50 mg by mouth every 6 hours as needed. Historical Provider, MD   Flaxseed Linseed, (FLAX SEED OIL PO) Take by mouth daily    Historical Provider, MD       Current medications:    Current Facility-Administered Medications   Medication Dose Route Frequency Provider Last Rate Last Admin    lactated ringers infusion   IntraVENous Continuous Marletta Needle, DO           Allergies:     Allergies   Allergen Reactions    Zofran [Ondansetron]      elongated qt wave    Naproxen      weakness    Penicillins      Unknown     Demerol Hcl [Meperidine] Rash    Sulfa Antibiotics Nausea And Vomiting       Problem List:    Patient Active Problem List   Diagnosis Code    Carpal tunnel syndrome G56.00    Trigger finger, acquired M65.30    Raynaud's disease I73.00    Benign essential HTN I10    Menopausal hot flushes N95.1    Fibromyalgia M79.7    Hx of thyroid cancer Z85.850    Hypothyroidism, postsurgical E89.0    Thyroid cancer ([facility] [address] C73    Class 1 obesity with body mass index (BMI) of 33.0 to 33.9 in adult E66.9, Z68.33    Menopause Z78.0       Past Medical History:        Diagnosis Date    Carpal tunnel syndrome     Ganglion cyst     Thyroid cancer ([facility] [address] 6/7/2021       Past Surgical History:        Procedure Laterality Date    BREAST LUMPECTOMY      CARPAL TUNNEL RELEASE      FOOT SURGERY      GALLBLADDER SURGERY      MOUTH SURGERY      SHOULDER SURGERY         Social History:    Social History     Tobacco Use    Smoking status: Former Smoker     Packs/day: 0.50     Years: 10.00     Pack years: 5.00     Quit date:      Years since quittin.    Smokeless tobacco: Never Used   Substance Use Topics    Alcohol use: Yes     Alcohol/week: 14.0 standard drinks     Types: 14 Glasses of wine per week                                Counseling given: Not Answered      Vital Signs (Current):   Vitals:    21 0654   BP: 137/80   Pulse: 74   Resp: 16   Temp: 98.5 °F (36.9 °C)   TempSrc: Tympanic   SpO2: 96%   Weight: 196 lb (88.9 kg)   Height: 5' 4\" (1.626 m)                                              BP Readings from Last 3 Encounters:   21 137/80   21 (!) 142/86   21 130/76       NPO Status:                                                                                 BMI:   Wt Readings from Last 3 Encounters:   21 196 lb (88.9 kg)   21 196 lb (88.9 kg)   21 195 lb (88.5 kg)     Body mass index is 33.64 kg/m². CBC:   Lab Results   Component Value Date    WBC 5.1 2020    RBC 4.66 2020    HGB 14.0 2020    HCT 42.8 2020    MCV 91.9 2020    RDW 13.5 2020     2020       CMP:   Lab Results   Component Value Date     2020    K 4.7 2020     2020    CO2 29 2020    BUN 13 2020    CREATININE 0.6 2020    GFRAA >60 2020    AGRATIO 2.2 2020    LABGLOM >60 2020    GLUCOSE 116 2020    PROT 6.8 2020    CALCIUM 9.5 2020    BILITOT 0.5 2020    ALKPHOS 113 2020    AST 43 2020    ALT 53 2020       POC Tests: No results for input(s): POCGLU, POCNA, POCK, POCCL, POCBUN, POCHEMO, POCHCT in the last 72 hours. Coags: No results found for: PROTIME, INR, APTT    HCG (If Applicable):  No results found for: PREGTESTUR, PREGSERUM, HCG, HCGQUANT     ABGs: No results found for: PHART, PO2ART, FZP0HAC, OIM9SGE, BEART, F9WQTDOE     Type & Screen (If Applicable):  No results found for: LABABO, LABRH    Drug/Infectious Status (If Applicable):  No results found for: HIV, HEPCAB    COVID-19 Screening (If Applicable): No results found for: COVID19        Anesthesia Evaluation    Airway: Mallampati: II  TM distance: >3 FB   Neck ROM: full  Mouth opening: > = 3 FB Dental:          Pulmonary:   (+) shortness of breath:  asthma:     (-) sleep apnea and not a current smoker                           Cardiovascular:  Exercise tolerance: good (>4 METS),   (+) hypertension:,                   Neuro/Psych:      (-) seizures           GI/Hepatic/Renal:   (+) GERD:,           Endo/Other:    (+) hypothyroidism::., .    (-) diabetes mellitus               Abdominal:             Vascular: Other Findings:             Anesthesia Plan      MAC     ASA 2     (-npo mn  -stress test in 2019 due to sob, found to have asthma, worse with exercise    Echo 2019  Study Conclusions     - Left ventricle: The cavity size is normal. Wall thickness is     normal. Systolic function was normal. The estimated ejection     fraction was in the range of 60% to 65%. Wall motion was normal;     there were no regional wall motion abnormalities. Left     ventricular diastolic function parameters were normal.   - Mitral valve: Mildly calcified annulus.   - Right ventricle: Systolic function was normal. TAPSE:     2.4cm. Tricuspid annular systolic velocity: 01.6JR/M.   )  Induction: intravenous. Anesthetic plan and risks discussed with patient.                       Grant Maya MD   11/2/2021

## 2021-11-02 NOTE — PROCEDURES
Colonoscopy REPORT    Patient:  Benjy Mckeon                  1960    Referring Physician:  Helene Mc    Endoscopist: Zia Mijares     Indication:  Screening - average risk     Medications:  MAC      Pre-Anesthesia Assessment:  I have reviewed and am in agreement with patient history and medication, including previous response to sedation. Prior to the procedure, a History and Physical was performed, and patient medications and allergies were reviewed. The risks and benefits of the procedure and the sedation options and risks were discussed with the patient. Complications included but were not limited to infection, bleeding, perforation, death, and missed lesions. All questions were answered and informed consent was obtained by the patient. Patient identification and proposed procedure were verified by the physician and the nurse in the pre-procedure area and in the procedure room. Mallampatti: II  ASA Grade Assessment: 2    After reviewing the risks and benefits, the patient was deemed in satisfactory condition to undergo the procedure. The anesthesia plan was to use MAC sedation. Immediately prior to administration of medications, the patient was re-assessed for adequacy to receive sedatives and a time out was performed. Patient and healthcare providers were in agreement it was the correct patient and procedure. The heart rate, respiratory rate, oxygen saturations, blood pressure, adequacy of pulmonary ventilation, and response to care were monitored throughout the procedure. The physical status of the patient was re-assessed after the procedure. After adequate sedation was achieved in stepwise fashion a rectal examination was performed and showed hemorrhoids. The pediatric colonoscope was then advanced atraumatically into the rectum and advanced without difficulty to the cecum.   The cecum was identified by the appendiceal orifice the ileocecal valve and other normal anatomy and a picture was obtained. The scope was then withdrawn (>6minutes) with close inspection of the mucosa in a circumferential manor. 3mm transverse polyp removed with cold snare. Retroflexed views under the ICV and of the right colon obtained. 3.5cm polyp with central depression 2 folds distal to the ICV. Lifted with 9mL of Orise and removed in piecemeal fashion. 4 clips placed to close the defect. Retroflexed views of the rectum show hemorrhoids. No immediate complications. The preparation was good. Estimated blood loss none    Impression:  S/p EMR of large ascending polyp  Removal of small transverse polyp  hemorrhoids  Plan:  The patient is aware it is their responsibility to call for biopsy results in 7 days. Repeat colonoscopy in 6months.

## 2021-11-02 NOTE — H&P
Pre-operative History and Physical    Patient: Christy Cervantes  : 1960     History Obtained From:  patient, electronic medical record    HISTORY OF PRESENT ILLNESS:    The patient is a 62 y.o. female who presents for a colonoscopy for screening. Past Medical History:        Diagnosis Date    Asthma     Carpal tunnel syndrome     Ganglion cyst     GERD (gastroesophageal reflux disease)     Hypertension     PONV (postoperative nausea and vomiting)     Thyroid cancer ([address] 2021     Past Surgical History:        Procedure Laterality Date    BREAST LUMPECTOMY      CARPAL TUNNEL RELEASE      CHOLECYSTECTOMY      COLONOSCOPY      ENDOSCOPY, COLON, DIAGNOSTIC      FOOT SURGERY      GALLBLADDER SURGERY      HYSTERECTOMY      MOUTH SURGERY      SHOULDER SURGERY      TONSILLECTOMY       Medications Prior to Admission:   No current facility-administered medications on file prior to encounter.      Current Outpatient Medications on File Prior to Encounter   Medication Sig Dispense Refill    amLODIPine (NORVASC) 10 MG tablet TAKE ONE TABLET BY MOUTH NIGHTLY 30 tablet 0    montelukast (SINGULAIR) 10 MG tablet TAKE ONE TABLET BY MOUTH NIGHTLY 30 tablet 0    omeprazole (PRILOSEC) 40 MG delayed release capsule TAKE ONE CAPSULE BY MOUTH EVERY MORNING BEFORE BREAKFAST 30 capsule 0    FLUoxetine (PROZAC) 40 MG capsule TAKE 1 CAPSULE BY MOUTH ONE TIME A DAY 30 capsule 0    gabapentin (NEURONTIN) 300 MG capsule TAKE ONE CAPSULE BY MOUTH EVERY EVENING 30 capsule 0    Misc Natural Products (GREEN TEA) TABS Take 1 tablet by mouth      Multiple Vitamins-Minerals (THERAPEUTIC MULTIVITAMIN-MINERALS) tablet Take 1 tablet by mouth daily      levothyroxine (SYNTHROID) 175 MCG tablet Take 1 tablet by mouth daily 90 tablet 1    albuterol sulfate HFA (PROAIR HFA) 108 (90 Base) MCG/ACT inhaler Inhale 2 puffs into the lungs every 4 hours as needed      Vivi Asencio-Chromium Kassandra 500-0.2 MG TABS Take by mouth      Calcium Carb-Cholecalciferol 500-600 MG-UNIT TABS Take by mouth every evening      vitamin D (VITAMIN D-1000 MAX ST) 25 MCG (1000 UT) TABS tablet Take 1,000 Units by mouth daily      Cranberry 200 MG CAPS Take by mouth      Ginkgo 60 MG TABS Take 2 tablets by mouth 2 times daily      Ginseng 100 MG CAPS Take by mouth      Glucosamine 500 MG CAPS Take 500 mg by mouth daily      ibuprofen (ADVIL;MOTRIN) 800 MG tablet Take 800 mg by mouth every 8 hours as needed      loratadine (CLARITIN) 10 MG tablet Take 10 mg by mouth daily      melatonin 3 MG TABS tablet Take 3 mg by mouth nightly      senna (SENOKOT) 8.6 MG TABS tablet Take 4 tablets by mouth as needed       traMADol (ULTRAM) 50 MG tablet Take 50 mg by mouth every 6 hours as needed.  Flaxseed, Linseed, (FLAX SEED OIL PO) Take by mouth daily      cyclobenzaprine (FLEXERIL) 10 MG tablet Take 10 mg by mouth 3 times daily as needed       Allergies:  Zofran [ondansetron], Naproxen, Penicillins, Demerol hcl [meperidine], and Sulfa antibiotics    History of allergic reaction to anesthesia:  No    Social History:   TOBACCO:   reports that she quit smoking about 31 years ago. She has a 5.00 pack-year smoking history. She has never used smokeless tobacco.  ETOH:   reports current alcohol use of about 14.0 standard drinks of alcohol per week. DRUGS:   reports no history of drug use.   Family History:       Problem Relation Age of Onset   [facility] [facility] Mother         lung       PHYSICAL EXAM:      /80   Pulse 74   Temp 98.5 °F (36.9 °C) (Tympanic)   Resp 16   Ht 5' 4\" (1.626 m)   Wt 196 lb (88.9 kg)   LMP  (LMP Unknown)   SpO2 96%   BMI 33.64 kg/m²  I        Heart:  No m/r/g +s1/s2 RRR    Lungs:  CTA bilaterally    Abdomen:  Soft, nontender, non distended; +bs    ASA Grade:  ASA 2 - Patient with mild systemic disease with no functional limitations    Mallampati Class:  Class I: Soft palate, uvula, fauces, pillars visible __________  Class II: Soft palate, uvula, fauces visible  ____x______   Class III: Soft palate, base of uvula visible  __________  Class IV: Hard palate only visible   __________      ASSESSMENT AND PLAN:    1. Patient is a 62 y.o. female here for colonoscopy with deep sedation  2. Procedure options, risks and benefits reviewed with patient. We specifically discussed that risks include, but are not limited to infection, bleeding, perforation, death, and missed lesions. Patient expresses understanding.

## 2021-11-11 ENCOUNTER — OFFICE VISIT (OUTPATIENT)
Dept: PRIMARY CARE CLINIC | Age: 61
End: 2021-11-11
Payer: COMMERCIAL

## 2021-11-11 VITALS
SYSTOLIC BLOOD PRESSURE: 132 MMHG | BODY MASS INDEX: 33.47 KG/M2 | DIASTOLIC BLOOD PRESSURE: 80 MMHG | WEIGHT: 195 LBS | HEART RATE: 92 BPM | OXYGEN SATURATION: 95 %

## 2021-11-11 DIAGNOSIS — R09.89 LUNG CRACKLES: ICD-10-CM

## 2021-11-11 DIAGNOSIS — J06.9 UPPER RESPIRATORY TRACT INFECTION, UNSPECIFIED TYPE: Primary | ICD-10-CM

## 2021-11-11 PROCEDURE — 99214 OFFICE O/P EST MOD 30 MIN: CPT | Performed by: STUDENT IN AN ORGANIZED HEALTH CARE EDUCATION/TRAINING PROGRAM

## 2021-11-11 RX ORDER — METHYLPREDNISOLONE 4 MG/1
TABLET ORAL
Qty: 1 KIT | Refills: 0 | Status: SHIPPED | OUTPATIENT
Start: 2021-11-11 | End: 2021-11-17

## 2021-11-11 SDOH — ECONOMIC STABILITY: FOOD INSECURITY: WITHIN THE PAST 12 MONTHS, THE FOOD YOU BOUGHT JUST DIDN'T LAST AND YOU DIDN'T HAVE MONEY TO GET MORE.: NEVER TRUE

## 2021-11-11 SDOH — ECONOMIC STABILITY: FOOD INSECURITY: WITHIN THE PAST 12 MONTHS, YOU WORRIED THAT YOUR FOOD WOULD RUN OUT BEFORE YOU GOT MONEY TO BUY MORE.: NEVER TRUE

## 2021-11-11 ASSESSMENT — PATIENT HEALTH QUESTIONNAIRE - PHQ9
SUM OF ALL RESPONSES TO PHQ9 QUESTIONS 1 & 2: 0
2. FEELING DOWN, DEPRESSED OR HOPELESS: 0
SUM OF ALL RESPONSES TO PHQ QUESTIONS 1-9: 0
1. LITTLE INTEREST OR PLEASURE IN DOING THINGS: 0

## 2021-11-11 ASSESSMENT — SOCIAL DETERMINANTS OF HEALTH (SDOH): HOW HARD IS IT FOR YOU TO PAY FOR THE VERY BASICS LIKE FOOD, HOUSING, MEDICAL CARE, AND HEATING?: NOT HARD AT ALL

## 2021-11-11 NOTE — PROGRESS NOTES
Ko Gomes (:  1960) is a 62 y.o. female,Established patient, here for evaluation of the following chief complaint(s):  Sinusitis (started a couple days ago )         ASSESSMENT/PLAN:  1. Upper respiratory tract infection, unspecified type  -     COVID-19  Mucinex, Flonase  Runny nose likely causing some vertigo secondary to eustachian tube dysfunction  Medrol Dosepak to help with this    2. Lung crackles  -     XR CHEST STANDARD (2 VW); Future  Mild bibasilar crackles  If Covid test negative recommend getting chest x-ray      No follow-ups on file. Subjective   SUBJECTIVE/OBJECTIVE:  HPI    No cough but runny nose and some blood when blowing. Already on Singulair 10 mg daily  Albuterol as needed, has been using it 1-2 times a day, when typically only needs it once a month. Has vertigo x 3 days  Fully vaccinated and booster covid and flu. Patient is a nurse, works with Covid patients    Has had floaters, in right eye only started a couple days ago, also with some HA's no ibupfren due to BP, but using tylenol, BP when having these sx will be in the high 130's/80's,     Saw cars and is scheduled to have stress and echo but didn't do it because she was having issues with breathing so she cancelled appt. Previous smoker when teenager and had smoked off and on for 20 years quit in . Review of Systems   All other systems reviewed and are negative. Objective   Physical Exam  Constitutional:       Appearance: Normal appearance. HENT:      Head: Normocephalic and atraumatic. Right Ear: Tympanic membrane, ear canal and external ear normal.      Left Ear: Tympanic membrane, ear canal and external ear normal.      Nose: Nose normal.      Mouth/Throat:      Mouth: Mucous membranes are moist.   Eyes:      Extraocular Movements: Extraocular movements intact. Cardiovascular:      Rate and Rhythm: Normal rate and regular rhythm. Heart sounds: Normal heart sounds.  No

## 2021-11-12 LAB — SARS-COV-2: NOT DETECTED

## 2021-12-06 RX ORDER — OMEPRAZOLE 40 MG/1
CAPSULE, DELAYED RELEASE ORAL
Qty: 30 CAPSULE | Refills: 3 | Status: SHIPPED | OUTPATIENT
Start: 2021-12-06 | End: 2022-03-31 | Stop reason: SDUPTHER

## 2021-12-06 RX ORDER — MONTELUKAST SODIUM 10 MG/1
TABLET ORAL
Qty: 30 TABLET | Refills: 3 | Status: SHIPPED | OUTPATIENT
Start: 2021-12-06 | End: 2022-03-31 | Stop reason: SDUPTHER

## 2021-12-06 RX ORDER — FLUOXETINE HYDROCHLORIDE 40 MG/1
CAPSULE ORAL
Qty: 30 CAPSULE | Refills: 0 | Status: SHIPPED | OUTPATIENT
Start: 2021-12-06 | End: 2022-01-21

## 2021-12-06 RX ORDER — AMLODIPINE BESYLATE 10 MG/1
TABLET ORAL
Qty: 30 TABLET | Refills: 3 | Status: SHIPPED | OUTPATIENT
Start: 2021-12-06 | End: 2021-12-07 | Stop reason: SDUPTHER

## 2021-12-06 NOTE — TELEPHONE ENCOUNTER
Medication:   Requested Prescriptions     Pending Prescriptions Disp Refills    amLODIPine (NORVASC) 10 MG tablet [Pharmacy Med Name: AMLODIPINE BESYLATE 10MG TABS] 30 tablet 3     Sig: TAKE ONE TABLET BY MOUTH NIGHTLY    FLUoxetine (PROZAC) 40 MG capsule [Pharmacy Med Name: FLUOXETINE HCL 40MG CAPS] 30 capsule 0     Sig: TAKE 1 CAPSULE BY MOUTH ONE TIME A DAY    montelukast (SINGULAIR) 10 MG tablet [Pharmacy Med Name: MONTELUKAST SODIUM 10MG TABS] 30 tablet 3     Sig: TAKE ONE TABLET BY MOUTH NIGHTLY    omeprazole (PRILOSEC) 40 MG delayed release capsule [Pharmacy Med Name: OMEPRAZOLE 40MG CPDR] 30 capsule 3     Sig: TAKE ONE CAPSULE BY MOUTH EVERY MORNING BEFORE BREAKFAST     Last Filled:  10.25.21     Last appt: 11/11/2021   Next appt: Visit date not found    Last OARRS: No flowsheet data found.

## 2021-12-07 RX ORDER — AMLODIPINE BESYLATE 10 MG/1
TABLET ORAL
Qty: 30 TABLET | Refills: 3 | Status: SHIPPED | OUTPATIENT
Start: 2021-12-07 | End: 2022-03-31 | Stop reason: SDUPTHER

## 2021-12-07 NOTE — TELEPHONE ENCOUNTER
Medication:   Requested Prescriptions     Pending Prescriptions Disp Refills    amLODIPine (NORVASC) 10 MG tablet 30 tablet 3     Last Filled:  12.6.21      Last appt: 11/11/2021   Next appt: Visit date not found    Last OARRS: No flowsheet data found.

## 2021-12-17 ENCOUNTER — HOSPITAL ENCOUNTER (OUTPATIENT)
Dept: GENERAL RADIOLOGY | Age: 61
Discharge: HOME | End: 2021-12-17
Payer: COMMERCIAL

## 2021-12-17 DIAGNOSIS — Z78.0 MENOPAUSE: ICD-10-CM

## 2021-12-17 DIAGNOSIS — C73 THYROID CANCER (HCC): ICD-10-CM

## 2021-12-17 PROCEDURE — 77080 DXA BONE DENSITY AXIAL: CPT

## 2021-12-19 ENCOUNTER — TELEPHONE (OUTPATIENT)
Dept: ENDOCRINOLOGY | Age: 61
End: 2021-12-19

## 2021-12-20 NOTE — TELEPHONE ENCOUNTER
Please inform patient that bone density was showed osteopenia in femoral neck, which is the thinnest part of hip bone. In total hip and spine it was normal.  I will discuss results during her appointment, please make sure she has a scheduled follow-up for thyroid and bone density results for the future.

## 2022-01-04 ENCOUNTER — TELEPHONE (OUTPATIENT)
Dept: ENDOCRINOLOGY | Age: 62
End: 2022-01-04

## 2022-01-21 RX ORDER — FLUOXETINE HYDROCHLORIDE 40 MG/1
CAPSULE ORAL
Qty: 90 CAPSULE | Refills: 2 | Status: SHIPPED | OUTPATIENT
Start: 2022-01-21 | End: 2022-03-31 | Stop reason: SDUPTHER

## 2022-01-21 NOTE — TELEPHONE ENCOUNTER
Medication:   Requested Prescriptions     Pending Prescriptions Disp Refills    FLUoxetine (PROZAC) 40 MG capsule [Pharmacy Med Name: FLUOXETINE HCL 40MG CAPS] 30 capsule 0     Sig: TAKE 1 CAPSULE BY MOUTH ONE TIME A DAY     Last Filled:  12.6.21    Last appt: 11/11/2021   Next appt: Visit date not found    Last OARRS: No flowsheet data found.

## 2022-03-24 ENCOUNTER — OFFICE VISIT (OUTPATIENT)
Dept: ENDOCRINOLOGY | Age: 62
End: 2022-03-24
Payer: COMMERCIAL

## 2022-03-24 VITALS
OXYGEN SATURATION: 98 % | BODY MASS INDEX: 33.63 KG/M2 | TEMPERATURE: 98 F | DIASTOLIC BLOOD PRESSURE: 84 MMHG | HEART RATE: 70 BPM | HEIGHT: 64 IN | RESPIRATION RATE: 14 BRPM | SYSTOLIC BLOOD PRESSURE: 144 MMHG | WEIGHT: 197 LBS

## 2022-03-24 DIAGNOSIS — E66.9 CLASS 1 OBESITY WITH BODY MASS INDEX (BMI) OF 33.0 TO 33.9 IN ADULT, UNSPECIFIED OBESITY TYPE, UNSPECIFIED WHETHER SERIOUS COMORBIDITY PRESENT: ICD-10-CM

## 2022-03-24 DIAGNOSIS — C73 THYROID CANCER (HCC): ICD-10-CM

## 2022-03-24 DIAGNOSIS — E89.0 HYPOTHYROIDISM, POSTSURGICAL: ICD-10-CM

## 2022-03-24 DIAGNOSIS — Z78.0 MENOPAUSE: ICD-10-CM

## 2022-03-24 DIAGNOSIS — E89.0 HYPOTHYROIDISM, POSTSURGICAL: Primary | ICD-10-CM

## 2022-03-24 LAB
ANTI-THYROGLOB ABS: <10 IU/ML
T3 FREE: 3.3 PG/ML (ref 2.3–4.2)
T4 FREE: 1.6 NG/DL (ref 0.9–1.8)
TSH SERPL DL<=0.05 MIU/L-ACNC: 0.14 UIU/ML (ref 0.27–4.2)

## 2022-03-24 PROCEDURE — 99214 OFFICE O/P EST MOD 30 MIN: CPT | Performed by: INTERNAL MEDICINE

## 2022-03-24 RX ORDER — LEVOTHYROXINE SODIUM 175 UG/1
175 TABLET ORAL DAILY
Qty: 90 TABLET | Refills: 1 | Status: CANCELLED | OUTPATIENT
Start: 2022-03-24

## 2022-03-24 NOTE — PROGRESS NOTES
SUBJECTIVE:  Theresa Drake is a 65 y.o. female who is being evaluated for hypothyroidism. 1. Hypothyroidism, postsurgical  This started in 2015. Patient was diagnosed with thyroid cancer, postsurgical hypothyroidism. The problem has been unchanged. Previous thyroid studies include: TSH and free thyroxine. Patient started medication in 2015. Currently patient is on: levothyroxine. Misses  0 doses a month. Current complaints: denies fatigue, weight changes, heat/cold intolerance, bowel/skin changes or CVS symptoms. Has nail changes, dry skin. Surgeon Dr. Rosangela Hardy. Not followed by him  No I-131 Tx    2. Thyroid cancer ([facility] [address]  No swelling or lump sensation in the neck area    2/13/2015 Operations/Procedures: Total thyroidectomy, central neck dissection 2/13/15    Doing well s/p total+CND for encapsulated FV PTC [id number] (0/2 LN)  LT4, TSH/Tg/renal today, GOVEA +/- but unlikely unless Tg elevated  Clinical History:    Pre-Operative Diagnosis: Follicular variant of papillary thyroid Ca, right    thyroid nodule as [facility]NA cytology [id number].    Post-Operative Diagnosis:     Same    Specimen(s) Submitted:   A.  Total Thyroid; B. Right Central Node Dissection        CPT Code(s):   05383 X 2    Office Info:   [id number]        FINAL DIAGNOSIS:        A.  Thyroid, total thyroidectomy:    - Papillary carcinoma, right lobe, completely excised; see synoptic report.        B.  Right central node dissection:    - Two lymph nodes negative for metastatic carcinoma; see synoptic report.        SYNOPTIC REPORT    THYROID GLAND: Resection        Procedure:  Total thyroidectomy        Lymph Node Sampling:  Right central node dissection        Tumor Focality:  Unifocal        Tumor Laterality:  Right lobe        Tumor Size:  Greatest dimension: 1.4 cm (gross examination)        Histologic Type:  Papillary carcinoma, follicular variant, encapsulated,    without capsular invasion        Margins:  Margins uninvolved by carcinoma (1 mm to black-inked margin)        Angioinvasion (vascular invasion):  Not identified        Lymphatic Invasion:  Not identified        Extrathyroidal Extension:  Not identified        Pathologic Staging (pTNM)    Primary Tumor (pT):  pT1:     Tumor size 2 cm or less, limited to thyroid    Regional Lymph Nodes (pN):  pN0:   No regional lymph node metastasis        Comments:    Given the prior diagnosis (see Clinical History, above), the current    carcinoma is not prospectively reviewed by another member of the pathology    department.                                  TB/saida        Microscopic Description:    Microscopic examination was performed.  All described special stains have    appropriate controls.          Had total thyroidectomy  Works as a nurse, had to change providers at     History of obstructive symptoms: difficulty swallowing No, changes in voice/hoarseness Yes. History of radiation to patient's neck: No  Resent iodine exposure: No  Family history includes no thyroid abnormalities. Family history of thyroid cancer: No    3. Class 1 obesity with body mass index (BMI) of 33.0 to 33.9 in adult, unspecified obesity type, unspecified whether serious comorbidity present  Eats healthy, active    4. Menopause  Had hysterectomy at age 55. Ovaries left.     Past Medical History:   Diagnosis Date    Asthma     Carpal tunnel syndrome     Ganglion cyst     GERD (gastroesophageal reflux disease)     Hypertension     PONV (postoperative nausea and vomiting)     Thyroid cancer ([facility] [address] 6/7/2021     Patient Active Problem List    Diagnosis Date Noted    Menopause 09/13/2021    Hypothyroidism, postsurgical 06/07/2021    Thyroid cancer ([facility] [address] 06/07/2021    Class 1 obesity with body mass index (BMI) of 33.0 to 33.9 in adult 06/07/2021    Benign essential HTN 10/26/2020    Menopausal hot flushes 10/26/2020    Fibromyalgia 10/26/2020    Hx of thyroid cancer 10/26/2020    Carpal tunnel syndrome 2014    Trigger finger, acquired 2014    Raynaud's disease 2014     Past Surgical History:   Procedure Laterality Date    BREAST LUMPECTOMY      CARPAL TUNNEL RELEASE      CHOLECYSTECTOMY      COLONOSCOPY      COLONOSCOPY  2021    COLONOSCOPY POLYPECTOMY ABLATION performed by Sebastien [facility] at [facility]  2021    COLONOSCOPY W/ ENDOSCOPIC MUCOSAL RESECTION performed by Sebastien Peters MD at [facility], COLON, DIAGNOSTIC      FOOT SURGERY      GALLBLADDER SURGERY      HYSTERECTOMY      MOUTH SURGERY      SHOULDER SURGERY      TONSILLECTOMY       Family History   Problem Relation Age of Onset    Cancer Mother         lung     Social History     Socioeconomic History    Marital status:      Spouse name: None    Number of children: 3    Years of education: None    Highest education level: None   Occupational History    None   Tobacco Use    Smoking status: Former Smoker     Packs/day: 0.50     Years: 10.00     Pack years: 5.00     Types: Cigarettes     Quit date:      Years since quittin.2    Smokeless tobacco: Never Used   Vaping Use    Vaping Use: Never used   Substance and Sexual Activity    Alcohol use: Not Currently     Alcohol/week: 14.0 standard drinks     Types: 14 Glasses of wine per week    Drug use: Never    Sexual activity: Yes   Other Topics Concern    None   Social History Narrative    None     Social Determinants of Health     Financial Resource Strain: Low Risk     Difficulty of Paying Living Expenses: Not hard at all   Food Insecurity: No Food Insecurity    Worried About Running Out of Food in the Last Year: Never true    Yash of Food in the Last Year: Never true   Transportation Needs:     Lack of Transportation (Medical): Not on file    Lack of Transportation (Non-Medical):  Not on file   Physical Activity:     Days of Exercise per Week: Not on file    Minutes of Exercise per Session: Not on file   Stress:     Feeling of Stress : Not on file   Social Connections:     Frequency of Communication with Friends and Family: Not on file    Frequency of Social Gatherings with Friends and Family: Not on file    Attends [employer] Services: Not on file    Active Member of [address] [place] [address] or Organizations: Not on file    Attends Club or Organization Meetings: Not on file    Marital Status: Not on file   Intimate Partner Violence:     Fear of Current or Ex-Partner: Not on file    Emotionally Abused: Not on file    Physically Abused: Not on file    Sexually Abused: Not on file   Housing Stability:     Unable to Pay for Housing in the Last Year: Not on file    Number of Felix in the Last Year: Not on file    Unstable Housing in the Last Year: Not on file     Current Outpatient Medications   Medication Sig Dispense Refill    FLUoxetine (PROZAC) 40 MG capsule TAKE 1 CAPSULE BY MOUTH ONE TIME A DAY 90 capsule 2    amLODIPine (NORVASC) 10 MG tablet TAKE ONE TABLET BY MOUTH NIGHTLY 30 tablet 3    montelukast (SINGULAIR) 10 MG tablet TAKE ONE TABLET BY MOUTH NIGHTLY 30 tablet 3    omeprazole (PRILOSEC) 40 MG delayed release capsule TAKE ONE CAPSULE BY MOUTH EVERY MORNING BEFORE BREAKFAST 30 capsule 3    gabapentin (NEURONTIN) 300 MG capsule TAKE ONE CAPSULE BY MOUTH EVERY EVENING (Patient taking differently: 3 times daily as needed. ) 30 capsule 0    Misc Natural Products (GREEN TEA) TABS Take 1 tablet by mouth      Multiple Vitamins-Minerals (THERAPEUTIC MULTIVITAMIN-MINERALS) tablet Take 1 tablet by mouth daily      levothyroxine (SYNTHROID) 175 MCG tablet Take 1 tablet by mouth daily 90 tablet 1    albuterol sulfate HFA (PROAIR HFA) 108 (90 Base) MCG/ACT inhaler Inhale 2 puffs into the lungs every 4 hours as needed      Brindall Asencio-Chromium Kassandra 500-0.2 MG TABS Take by mouth      Calcium Carb-Cholecalciferol 500-600 MG-UNIT TABS Take by mouth every evening      vitamin D (VITAMIN D-1000 MAX ST) 25 MCG (1000 UT) TABS tablet Take 1,000 Units by mouth daily      Cranberry 200 MG CAPS Take by mouth      cyclobenzaprine (FLEXERIL) 10 MG tablet Take 10 mg by mouth 3 times daily as needed      Ginkgo 60 MG TABS Take 2 tablets by mouth 2 times daily      Ginseng 100 MG CAPS Take by mouth      Glucosamine 500 MG CAPS Take 500 mg by mouth daily      ibuprofen (ADVIL;MOTRIN) 800 MG tablet Take 800 mg by mouth every 8 hours as needed      loratadine (CLARITIN) 10 MG tablet Take 10 mg by mouth daily      melatonin 3 MG TABS tablet Take 3 mg by mouth nightly      senna (SENOKOT) 8.6 MG TABS tablet Take 4 tablets by mouth as needed       traMADol (ULTRAM) 50 MG tablet Take 50 mg by mouth every 6 hours as needed.  Flaxseed, Linseed, (FLAX SEED OIL PO) Take by mouth daily       No current facility-administered medications for this visit.      Allergies   Allergen Reactions    Zofran [Ondansetron]      elongated qt wave    Naproxen      weakness    Penicillins      Unknown     Demerol Hcl [Meperidine] Rash    Sulfa Antibiotics Nausea And Vomiting     Family Status   Relation Name Status    Mother      Father         Review of Systems:  Constitutional: no fatigue, no fever, no recent weight gain, has intentional recent weight loss, no changes in appetite  Eyes: no eye pain, no change in vision, no eye redness, no eye irritation, no double vision  Ears, nose, throat: no nasal congestion, no sore throat, no earache, no decrease in hearing, no hoarseness, no dry mouth, no sinus problems, no difficulty swallowing, no neck lumps, no dental problems, no mouth sores, no ringing in ears  Pulmonary: has shortness of breath, no wheezing, has dyspnea on exertion, no cough  Cardiovascular: no chest pain, has lower extremity edema, no orthopnea, no intermittent leg claudication, has palpitations  Gastrointestinal: no abdominal pain, no nausea, no vomiting, no diarrhea, no constipation, no dysphagia, no heartburn, no bloating  Genitourinary: no dysuria, no urinary incontinence, no urinary hesitancy, no urinary frequency, no feelings of urinary urgency, no nocturia  Musculoskeletal: has joint swelling, has joint stiffness, has joint pain, has muscle cramps, has muscle pain  Integument/Breast: no hair loss, no skin rashes, no skin lesions, no itching, has dry skin  Neurological: no numbness, no tingling, no weakness, no confusion, no headaches, no dizziness, no fainting, no tremors, no decrease in memory, no balance problems  Psychiatric: no anxiety, no depression, no insomnia  Hematologic/Lymphatic: no tendency for easy bleeding, no swollen lymph nodes, no tendency for easy bruising  Immunology: has seasonal allergies, no frequent infections, no frequent illnesses  Endocrine: has temperature intolerance    BP (!) 144/84   Pulse 70   Temp 98 °F (36.7 °C)   Resp 14   Ht 5' 4\" (1.626 m)   Wt 197 lb (89.4 kg)   LMP  (LMP Unknown)   SpO2 98%   BMI 33.81 kg/m²    Wt Readings from Last 3 Encounters:   03/24/22 197 lb (89.4 kg)   11/11/21 195 lb (88.5 kg)   11/02/21 196 lb (88.9 kg)     Body mass index is 33.81 kg/m².     OBJECTIVE:  Constitutional: no acute distress, well appearing and well nourished  Psychiatric: oriented to person, place and time, judgement and insight and normal, recent and remote memory and intact and mood and affect are normal  Skin: skin and subcutaneous tissue is normal without mass, normal turgor  Head and Face: examination of head and face revealed no abnormalities  Eyes: no lid or conjunctival swelling, erythema or discharge, pupils are normal, equal, round, reactive to light  Ears/Nose: external inspection of ears and nose revealed no abnormalities, hearing is grossly normal  Oropharynx/Mouth/Face: lips, tongue and gums are normal with no lesions, the voice quality was normal  Neck: neck is supple and symmetric, with midline trachea and no masses, thyroid is not palpable  Lymphatics: normal cervical lymph nodes, normal supraclavicular nodes  Pulmonary: no increased work of breathing or signs of respiratory distress, lungs are clear to auscultation  Cardiovascular: normal heart rate and rhythm, normal S1 and S2, no murmurs and pedal pulses and 2+ bilaterally, No edema  Abdomen: abdomen is soft, non-tender with no masses  Musculoskeletal: normal gait and station and exam of the digits and nails are normal  Neurological: normal coordination and normal general cortical function      Lab Review:    Lab Results   Component Value Date    WBC 5.1 11/18/2020    HGB 14.0 11/18/2020    HCT 42.8 11/18/2020    MCV 91.9 11/18/2020     11/18/2020     Lab Results   Component Value Date     11/18/2020    K 4.7 11/18/2020     11/18/2020    CO2 29 11/18/2020    BUN 13 11/18/2020    CREATININE 0.6 11/18/2020    GLUCOSE 116 11/18/2020    CALCIUM 9.5 11/18/2020    PROT 6.8 11/18/2020    LABALBU 4.7 11/18/2020    BILITOT 0.5 11/18/2020    ALKPHOS 113 11/18/2020    AST 43 11/18/2020    ALT 53 11/18/2020    LABGLOM >60 11/18/2020    GFRAA >60 11/18/2020    AGRATIO 2.2 11/18/2020    GLOB 2.1 11/18/2020     Lab Results   Component Value Date    TSHFT4 8.53 11/18/2020    TSH 0.15 09/08/2021    FT3 3.2 09/08/2021     No results found for: LABA1C  No results found for: EAG  Lab Results   Component Value Date    CHOL 251 11/18/2020     Lab Results   Component Value Date    TRIG 92 11/18/2020     Lab Results   Component Value Date    HDL 80 11/18/2020     Lab Results   Component Value Date    LDLCALC 153 11/18/2020     Lab Results   Component Value Date    LABVLDL 18 11/18/2020     No results found for: CHOLHDLRATIO  No results found for: LABMICR, [id number]  No results found for: VITD25     ASSESSMENT/PLAN:  1.  Hypothyroidism, postsurgical  Call for results  Send levothyroxine to pharmacy then   TSH 0.15  Continue levothyroxine 0.175 mg daily  - T3, Free; Future  - T4, Free; Future  - TSH without Reflex; Future    2. Thyroid cancer (HCC)  Thyroglobulin antibody negative   Total thyroidectomy 2015  [id number]  Largest tumor diameter 1.4 cm, encapsulated, follicular variant of papillary thyroid carcinoma  - T3, Free; Future  - T4, Free; Future  - Anti-Thyroglobulin Antibody; Future  - TSH without Reflex; Future  - Thyroglobulin; Future    3. Class 1 obesity with body mass index (BMI) of 33.0 to 33.9 in adult, unspecified obesity type, unspecified whether serious comorbidity present  Diet, exercise    4. Menopause  No family history of osteoporosis or hip fractures  No steroid use  No smoking  Had toe fracture        Reviewed and/or ordered clinical lab results Yes  Reviewed and/or ordered radiology tests Yes   Reviewed and/or ordered other diagnostic tests No  Discussed test results with performing physician No  Independently reviewed image, tracing, or specimen No  Made a decision to obtain old records No  Reviewed and summarized old records Yes   TSH 0.17-0.04-13.53-4.4-13.11-8.53   Ref Range & Units 5 yr ago   Thyroglobulin 1.6 - 59.9 ng/mL <0.2Low         Thyroglobulin Ab 0.0 - 39.0 IU/mL <20.0        Obtained history from other than patient No    Andria Cooperla Tawnya was counseled regarding symptoms of thyroid cancer, hypothyroidism diagnosis, course and complications of disease if inadequately treated, side effects of medications, diagnosis, treatment options, and prognosis, risks, benefits, complications, and alternatives of treatment, labs, imaging and other studies and treatment targets and goals, target TSH and thyroid cancer treatment. She understands instructions and counseling. Total time I spent for this encounter 30 minutes    Return in about 3 months (around 6/24/2022) for thyroid problems.     Electronically signed by David Najera MD on 3/24/2022 at 3:20 PM

## 2022-03-26 ENCOUNTER — TELEPHONE (OUTPATIENT)
Dept: ENDOCRINOLOGY | Age: 62
End: 2022-03-26

## 2022-03-26 DIAGNOSIS — E89.0 HYPOTHYROIDISM, POSTSURGICAL: ICD-10-CM

## 2022-03-28 RX ORDER — LEVOTHYROXINE SODIUM 175 UG/1
175 TABLET ORAL EVERY OTHER DAY
Qty: 45 TABLET | Refills: 1 | Status: SHIPPED | OUTPATIENT
Start: 2022-03-28

## 2022-03-28 RX ORDER — LEVOTHYROXINE SODIUM 0.15 MG/1
150 TABLET ORAL EVERY OTHER DAY
Qty: 45 TABLET | Refills: 1 | Status: SHIPPED | OUTPATIENT
Start: 2022-03-28

## 2022-03-28 NOTE — TELEPHONE ENCOUNTER
Pt agreeable. Send 15 mcg to [facility] and informed pt, pt expressed understanding.

## 2022-03-30 NOTE — TELEPHONE ENCOUNTER
Medication:   Requested Prescriptions     Pending Prescriptions Disp Refills    amLODIPine (NORVASC) 10 MG tablet [Pharmacy Med Name: AMLODIPINE BESYLATE 10MG TABS] 30 tablet 3     Sig: TAKE ONE TABLET BY MOUTH NIGHTLY    montelukast (SINGULAIR) 10 MG tablet [Pharmacy Med Name: MONTELUKAST SODIUM 10MG TABS] 30 tablet 3     Sig: TAKE ONE TABLET BY MOUTH NIGHTLY    omeprazole (PRILOSEC) 40 MG delayed release capsule [Pharmacy Med Name: OMEPRAZOLE 40MG CPDR] 30 capsule 3     Sig: TAKE ONE CAPSULE BY MOUTH EVERY MORNING BEFORE BREAKFAST       Last appt: 11/11/2021   Next appt: Visit date not found    Last OARRS: No flowsheet data found. Pt has appt as NP with another provider tomorrow. Forwarding this to that practice.

## 2022-03-30 NOTE — PROGRESS NOTES
PROGRESS NOTE  Date of Service:  3/31/2022    SUBJECTIVE:  Patient ID: Deborah Desai is a 65 y.o. female    HPI:     In dec 2021 she was making cookies and when working with dough felt sudden pop and pain at her right 1st MCP. Pain persisted and has had decrease use and ability of her right hand. She was evaluation with ortho and recommend for MRI but insurance denied this. She is in need of new ortho as previous is not on insurance    History of thyroid cancer; s/p thyroidectomy. Follows with endocrinology and has been well replaced    Hypertension- on amlodipine without difficulty. Does not check blood pressure regularly  Denies headache, chest pain, edema    Hyperlipidemia- no previous medications  The 10-year ASCVD risk score (Anastacia Graham., et al., 2013) is: 6.4%    Values used to calculate the score:      Age: 65 years      Sex: Female      Is Non- : No      Diabetic: No      Tobacco smoker: No      Systolic Blood Pressure: 300 mmHg      Is BP treated: Yes      HDL Cholesterol: 75 mg/dL      Total Cholesterol: 255 mg/dL    Elevated LFT in the past    Asthma-  On singulair and symbicort with rare use of albuterol  Has completed pneumovax    Depression- on fluoxetine and feels symptoms are controlled. Increased stressors and fatigue with work hours which can affect symptoms.     History Colonic polyps    She is , daughter Jennings Runner  Works in PACU at [employer]    Past Surgical History:   Procedure Laterality Date    BREAST LUMPECTOMY      BUNIONECTOMY Bilateral     CARPAL TUNNEL RELEASE      CHOLECYSTECTOMY      COLONOSCOPY  11/02/2021    COLONOSCOPY POLYPECTOMY ABLATION performed by Zack Sheppard MD at [facility] [place]  11/02/2021    COLONOSCOPY W/ ENDOSCOPIC MUCOSAL RESECTION performed by Zack Sheppard MD at [facility] [place], COLON, [facility] [address], VAGINAL      SHOULDER SURGERY Right  TONSILLECTOMY      WISDOM TOOTH EXTRACTION        Social History     Tobacco Use    Smoking status: Former Smoker     Packs/day: 0.50     Years: 10.00     Pack years: 5.00     Types: Cigarettes     Quit date:      Years since quittin.2    Smokeless tobacco: Never Used   Substance Use Topics    Alcohol use: Not Currently     Alcohol/week: 14.0 standard drinks     Types: 14 Glasses of wine per week      Family History   Problem Relation Age of Onset    Cancer Mother         lung     Current Outpatient Medications on File Prior to Visit   Medication Sig Dispense Refill    SUPREP BOWEL PREP KIT 17.5-3.13-1.6 GM/177ML SOLN solution       APPLE CIDER VINEGAR PO Take by mouth      Nutritional Supplements (ESTROVEN NIGHTTIME PO) Take by mouth      traMADol (ULTRAM) 50 MG tablet Take 50 mg by mouth every 6 hours as needed for Pain.       fluticasone (FLONASE) 50 MCG/ACT nasal spray 1 spray by Each Nostril route daily      budesonide-formoterol (SYMBICORT) 160-4.5 MCG/ACT AERO Inhale 2 puffs into the lungs 2 times daily      levothyroxine (SYNTHROID) 175 MCG tablet Take 1 tablet by mouth every other day 45 tablet 1    levothyroxine (SYNTHROID) 150 MCG tablet Take 1 tablet by mouth every other day 45 tablet 1    Misc Natural Products (GREEN TEA) TABS Take 1 tablet by mouth      Multiple Vitamins-Minerals (THERAPEUTIC MULTIVITAMIN-MINERALS) tablet Take 1 tablet by mouth daily      albuterol sulfate HFA (PROAIR HFA) 108 (90 Base) MCG/ACT inhaler Inhale 2 puffs into the lungs every 4 hours as needed      Brindall Asencio-Chromium Kassandra 500-0.2 MG TABS Take by mouth      Calcium Carb-Cholecalciferol 500-600 MG-UNIT TABS Take by mouth every evening      vitamin D (VITAMIN D-1000 MAX ST) 25 MCG (1000 UT) TABS tablet Take 1,000 Units by mouth daily      Cranberry 200 MG CAPS Take by mouth      cyclobenzaprine (FLEXERIL) 10 MG tablet Take 10 mg by mouth 3 times daily as needed      Ginkgo 60 MG TABS Take 2 tablets by mouth 2 times daily      Ginseng 100 MG CAPS Take by mouth      Glucosamine 500 MG CAPS Take 500 mg by mouth daily      loratadine (CLARITIN) 10 MG tablet Take 10 mg by mouth daily      melatonin 3 MG TABS tablet Take 3 mg by mouth nightly      senna (SENOKOT) 8.6 MG TABS tablet Take 4 tablets by mouth as needed       Flaxseed, Linseed, (FLAX SEED OIL PO) Take by mouth daily       No current facility-administered medications on file prior to visit. Allergies   Allergen Reactions    Zofran [Ondansetron]      elongated qt wave    Naproxen      weakness    Penicillins      Unknown     Demerol Hcl [Meperidine] Rash    Sulfa Antibiotics Nausea And Vomiting        Review of Systems   All other systems reviewed and are negative. OBJECTIVE:  Vitals:    03/31/22 1358   BP: (!) 148/86   Site: Right Upper Arm   Position: Sitting   Cuff Size: Medium Adult   Pulse: 73   Resp: 18   Temp: 96.2 °F (35.7 °C)   TempSrc: Temporal   SpO2: 97%   Weight: 196 lb 3.2 oz (89 kg)   Height: 5' 4\" (1.626 m)      Body mass index is 33.68 kg/m². Physical Exam  Vitals reviewed. Constitutional:       Appearance: Normal appearance. HENT:      Head: Normocephalic and atraumatic. Eyes:      General: No scleral icterus. Conjunctiva/sclera: Conjunctivae normal.   Cardiovascular:      Rate and Rhythm: Normal rate and regular rhythm. Heart sounds: Normal heart sounds. No murmur heard. Pulmonary:      Effort: Pulmonary effort is normal.      Breath sounds: Normal breath sounds. Musculoskeletal:      Right lower leg: No edema. Left lower leg: No edema. Comments: Right 1st MCP with significant swelling. Decreased  strength, decreased rom   Lymphadenopathy:      Cervical: No cervical adenopathy. Neurological:      General: No focal deficit present. Mental Status: She is alert and oriented to person, place, and time.    Psychiatric:         Attention and Perception: Attention and perception normal.         Mood and Affect: Mood and affect normal.         Speech: Speech normal.         Behavior: Behavior normal. Behavior is cooperative. Thought Content: Thought content normal.         Cognition and Memory: Cognition and memory normal.         Judgment: Judgment normal.         ASSESSMENT:  1. Benign essential HTN    2. Pure hypercholesterolemia    3. Hypothyroidism, postsurgical    4. Right hand pain    5. Encounter for hepatitis C screening test for low risk patient    6. Encounter for screening for diabetes mellitus         PLAN:   1. Benign essential HTN  Blood pressure is not controlled. Medication regimen adjusted. Recommend home blood pressure monitoring. Recommend low sodium diet, at least 150 minutes of cardiovascular exercise per week. Recommend weight loss  - Comprehensive Metabolic Panel  - CBC with Auto Differential  - amLODIPine (NORVASC) 10 MG tablet; TAKE ONE TABLET BY MOUTH NIGHTLY  Dispense: 90 tablet; Refill: 1  - lisinopril (PRINIVIL;ZESTRIL) 10 MG tablet; Take 1 tablet by mouth daily  Dispense: 90 tablet; Refill: 0    2. Pure hypercholesterolemia  Assess labs and monitor risk scoring. Recommend dietary changes and 150 minutes cardiovascular exercise per week. - Lipid Panel    3. Hypothyroidism, postsurgical  Continue to follow with endocrinology    4. Right hand pain    - Ambulatory referral to Hand Surgery    5. Encounter for hepatitis C screening test for low risk patient  Hep c    6. Encounter for screening for diabetes mellitus    - Hemoglobin A1C    Discussed with patient that I do not rx anticipatory rx for tramadol for back pain. Evaluation needed with back pain episodes and NSAIDS recommend as well   Return in about 10 weeks (around 6/9/2022). Approximately 40 minutes of time were spent in preparation,review of chart, direct patient contact, care coordination, documentation and activities otherwise related to this encounter.     Electronically signed by Deni Romero MD on 3/30/22 at 12:28 PM.     Please note this chart was generated using dragon dictation software. Although every effort was made to ensure the accuracy of this automated transcription, some errors in transcription may have occurred.

## 2022-03-31 ENCOUNTER — OFFICE VISIT (OUTPATIENT)
Dept: PRIMARY CARE CLINIC | Age: 62
End: 2022-03-31
Payer: COMMERCIAL

## 2022-03-31 VITALS
WEIGHT: 196.2 LBS | TEMPERATURE: 96.2 F | SYSTOLIC BLOOD PRESSURE: 148 MMHG | HEART RATE: 73 BPM | DIASTOLIC BLOOD PRESSURE: 86 MMHG | HEIGHT: 64 IN | RESPIRATION RATE: 18 BRPM | OXYGEN SATURATION: 97 % | BODY MASS INDEX: 33.49 KG/M2

## 2022-03-31 DIAGNOSIS — Z11.59 ENCOUNTER FOR HEPATITIS C SCREENING TEST FOR LOW RISK PATIENT: ICD-10-CM

## 2022-03-31 DIAGNOSIS — E78.00 PURE HYPERCHOLESTEROLEMIA: ICD-10-CM

## 2022-03-31 DIAGNOSIS — M79.641 RIGHT HAND PAIN: ICD-10-CM

## 2022-03-31 DIAGNOSIS — I10 BENIGN ESSENTIAL HTN: Primary | ICD-10-CM

## 2022-03-31 DIAGNOSIS — E89.0 HYPOTHYROIDISM, POSTSURGICAL: ICD-10-CM

## 2022-03-31 DIAGNOSIS — Z13.1 ENCOUNTER FOR SCREENING FOR DIABETES MELLITUS: ICD-10-CM

## 2022-03-31 LAB
BASOPHILS ABSOLUTE: 0.1 K/UL (ref 0–0.2)
BASOPHILS RELATIVE PERCENT: 1.1 %
EOSINOPHILS ABSOLUTE: 0.1 K/UL (ref 0–0.6)
EOSINOPHILS RELATIVE PERCENT: 2.3 %
HCT VFR BLD CALC: 43.8 % (ref 36–48)
HEMOGLOBIN: 14.3 G/DL (ref 12–16)
LYMPHOCYTES ABSOLUTE: 1.9 K/UL (ref 1–5.1)
LYMPHOCYTES RELATIVE PERCENT: 36 %
MCH RBC QN AUTO: 30 PG (ref 26–34)
MCHC RBC AUTO-ENTMCNC: 32.7 G/DL (ref 31–36)
MCV RBC AUTO: 91.7 FL (ref 80–100)
MONOCYTES ABSOLUTE: 0.4 K/UL (ref 0–1.3)
MONOCYTES RELATIVE PERCENT: 7.9 %
NEUTROPHILS ABSOLUTE: 2.8 K/UL (ref 1.7–7.7)
NEUTROPHILS RELATIVE PERCENT: 52.7 %
PDW BLD-RTO: 13 % (ref 12.4–15.4)
PLATELET # BLD: 277 K/UL (ref 135–450)
PMV BLD AUTO: 8.9 FL (ref 5–10.5)
RBC # BLD: 4.78 M/UL (ref 4–5.2)
THYROGLOBULIN BY LC-MS/MS, SERUM/PLASMA: <0.5 NG/ML (ref 1.3–31.8)
WBC # BLD: 5.3 K/UL (ref 4–11)

## 2022-03-31 PROCEDURE — 99215 OFFICE O/P EST HI 40 MIN: CPT | Performed by: FAMILY MEDICINE

## 2022-03-31 RX ORDER — AMLODIPINE BESYLATE 10 MG/1
TABLET ORAL
Qty: 90 TABLET | Refills: 1 | Status: SHIPPED | OUTPATIENT
Start: 2022-03-31 | End: 2022-06-14

## 2022-03-31 RX ORDER — BUDESONIDE AND FORMOTEROL FUMARATE DIHYDRATE 160; 4.5 UG/1; UG/1
2 AEROSOL RESPIRATORY (INHALATION) 2 TIMES DAILY
COMMUNITY

## 2022-03-31 RX ORDER — MONTELUKAST SODIUM 10 MG/1
TABLET ORAL
Qty: 30 TABLET | Refills: 3 | OUTPATIENT
Start: 2022-03-31

## 2022-03-31 RX ORDER — LISINOPRIL 10 MG/1
10 TABLET ORAL DAILY
Qty: 90 TABLET | Refills: 0 | Status: SHIPPED | OUTPATIENT
Start: 2022-03-31 | End: 2022-05-18 | Stop reason: SINTOL

## 2022-03-31 RX ORDER — FLUOXETINE HYDROCHLORIDE 40 MG/1
CAPSULE ORAL
Qty: 90 CAPSULE | Refills: 1 | Status: SHIPPED | OUTPATIENT
Start: 2022-03-31 | End: 2022-09-11 | Stop reason: SDUPTHER

## 2022-03-31 RX ORDER — OMEPRAZOLE 40 MG/1
CAPSULE, DELAYED RELEASE ORAL
Qty: 30 CAPSULE | Refills: 3 | OUTPATIENT
Start: 2022-03-31

## 2022-03-31 RX ORDER — AMLODIPINE BESYLATE 10 MG/1
TABLET ORAL
Qty: 30 TABLET | Refills: 3 | OUTPATIENT
Start: 2022-03-31

## 2022-03-31 RX ORDER — TRAMADOL HYDROCHLORIDE 50 MG/1
50 TABLET ORAL EVERY 6 HOURS PRN
COMMUNITY

## 2022-03-31 RX ORDER — OMEPRAZOLE 40 MG/1
40 CAPSULE, DELAYED RELEASE ORAL DAILY
Qty: 90 CAPSULE | Refills: 1 | Status: SHIPPED | OUTPATIENT
Start: 2022-03-31 | End: 2022-04-04 | Stop reason: SDUPTHER

## 2022-03-31 RX ORDER — FLUTICASONE PROPIONATE 50 MCG
1 SPRAY, SUSPENSION (ML) NASAL DAILY
COMMUNITY

## 2022-03-31 RX ORDER — MONTELUKAST SODIUM 10 MG/1
10 TABLET ORAL NIGHTLY
Qty: 90 TABLET | Refills: 1 | Status: SHIPPED | OUTPATIENT
Start: 2022-03-31 | End: 2022-06-14

## 2022-03-31 RX ORDER — SODIUM, POTASSIUM,MAG SULFATES 17.5-3.13G
SOLUTION, RECONSTITUTED, ORAL ORAL
COMMUNITY
Start: 2022-03-29

## 2022-03-31 ASSESSMENT — PATIENT HEALTH QUESTIONNAIRE - PHQ9
SUM OF ALL RESPONSES TO PHQ QUESTIONS 1-9: 2
SUM OF ALL RESPONSES TO PHQ9 QUESTIONS 1 & 2: 0
10. IF YOU CHECKED OFF ANY PROBLEMS, HOW DIFFICULT HAVE THESE PROBLEMS MADE IT FOR YOU TO DO YOUR WORK, TAKE CARE OF THINGS AT HOME, OR GET ALONG WITH OTHER PEOPLE: 0
5. POOR APPETITE OR OVEREATING: 1
4. FEELING TIRED OR HAVING LITTLE ENERGY: 1
SUM OF ALL RESPONSES TO PHQ QUESTIONS 1-9: 2
6. FEELING BAD ABOUT YOURSELF - OR THAT YOU ARE A FAILURE OR HAVE LET YOURSELF OR YOUR FAMILY DOWN: 0
3. TROUBLE FALLING OR STAYING ASLEEP: 0
1. LITTLE INTEREST OR PLEASURE IN DOING THINGS: 0
SUM OF ALL RESPONSES TO PHQ QUESTIONS 1-9: 2
SUM OF ALL RESPONSES TO PHQ QUESTIONS 1-9: 2
2. FEELING DOWN, DEPRESSED OR HOPELESS: 0
7. TROUBLE CONCENTRATING ON THINGS, SUCH AS READING THE NEWSPAPER OR WATCHING TELEVISION: 0
8. MOVING OR SPEAKING SO SLOWLY THAT OTHER PEOPLE COULD HAVE NOTICED. OR THE OPPOSITE, BEING SO FIGETY OR RESTLESS THAT YOU HAVE BEEN MOVING AROUND A LOT MORE THAN USUAL: 0
9. THOUGHTS THAT YOU WOULD BE BETTER OFF DEAD, OR OF HURTING YOURSELF: 0

## 2022-03-31 NOTE — TELEPHONE ENCOUNTER
Pharmacy calling with refill request.       Requesting a 90 day supply- cheaper cost for patient    montelukast (SINGULAIR) 10 MG tablet     omeprazole (PRILOSEC) 40 MG delayed release capsule       [address], [address], [address]

## 2022-04-01 PROBLEM — R73.03 PREDIABETES: Status: ACTIVE | Noted: 2022-04-01

## 2022-04-01 PROBLEM — C73 THYROID CANCER (HCC): Status: RESOLVED | Noted: 2021-06-07 | Resolved: 2022-04-01

## 2022-04-01 LAB
A/G RATIO: 2.2 (ref 1.1–2.2)
ALBUMIN SERPL-MCNC: 4.8 G/DL (ref 3.4–5)
ALP BLD-CCNC: 99 U/L (ref 40–129)
ALT SERPL-CCNC: 40 U/L (ref 10–40)
ANION GAP SERPL CALCULATED.3IONS-SCNC: 16 MMOL/L (ref 3–16)
AST SERPL-CCNC: 39 U/L (ref 15–37)
BILIRUB SERPL-MCNC: 0.6 MG/DL (ref 0–1)
BUN BLDV-MCNC: 19 MG/DL (ref 7–20)
CALCIUM SERPL-MCNC: 9.4 MG/DL (ref 8.3–10.6)
CHLORIDE BLD-SCNC: 100 MMOL/L (ref 99–110)
CHOLESTEROL, TOTAL: 255 MG/DL (ref 0–199)
CO2: 22 MMOL/L (ref 21–32)
CREAT SERPL-MCNC: 0.6 MG/DL (ref 0.6–1.2)
ESTIMATED AVERAGE GLUCOSE: 119.8 MG/DL
GFR AFRICAN AMERICAN: >60
GFR NON-AFRICAN AMERICAN: >60
GLUCOSE BLD-MCNC: 101 MG/DL (ref 70–99)
HBA1C MFR BLD: 5.8 %
HDLC SERPL-MCNC: 75 MG/DL (ref 40–60)
LDL CHOLESTEROL CALCULATED: 161 MG/DL
POTASSIUM SERPL-SCNC: 4.6 MMOL/L (ref 3.5–5.1)
SODIUM BLD-SCNC: 138 MMOL/L (ref 136–145)
TOTAL PROTEIN: 7 G/DL (ref 6.4–8.2)
TRIGL SERPL-MCNC: 97 MG/DL (ref 0–150)
VLDLC SERPL CALC-MCNC: 19 MG/DL

## 2022-04-02 ENCOUNTER — TELEPHONE (OUTPATIENT)
Dept: ENDOCRINOLOGY | Age: 62
End: 2022-04-02

## 2022-04-03 PROBLEM — E78.00 PURE HYPERCHOLESTEROLEMIA: Status: ACTIVE | Noted: 2022-04-03

## 2022-04-04 RX ORDER — OMEPRAZOLE 40 MG/1
40 CAPSULE, DELAYED RELEASE ORAL DAILY
Qty: 90 CAPSULE | Refills: 1 | Status: SHIPPED | OUTPATIENT
Start: 2022-04-04 | End: 2022-06-14

## 2022-04-04 NOTE — TELEPHONE ENCOUNTER
Medication:   Requested Prescriptions     Pending Prescriptions Disp Refills    omeprazole (PRILOSEC) 40 MG delayed release capsule 90 capsule 1     Sig: Take 1 capsule by mouth daily     Last Filled:  3.31.22    Last appt: 11/11/2021   Next appt: Visit date not found    Last OARRS: No flowsheet data found.

## 2022-04-11 ENCOUNTER — ANESTHESIA EVENT (OUTPATIENT)
Dept: ENDOSCOPY | Age: 62
End: 2022-04-11
Payer: COMMERCIAL

## 2022-04-12 ENCOUNTER — ANESTHESIA (OUTPATIENT)
Dept: ENDOSCOPY | Age: 62
End: 2022-04-12
Payer: COMMERCIAL

## 2022-04-12 ENCOUNTER — HOSPITAL ENCOUNTER (OUTPATIENT)
Age: 62
Setting detail: OUTPATIENT SURGERY
Discharge: HOME | End: 2022-04-12
Attending: INTERNAL MEDICINE | Admitting: INTERNAL MEDICINE
Payer: COMMERCIAL

## 2022-04-12 VITALS
OXYGEN SATURATION: 96 % | SYSTOLIC BLOOD PRESSURE: 92 MMHG | RESPIRATION RATE: 11 BRPM | DIASTOLIC BLOOD PRESSURE: 51 MMHG

## 2022-04-12 VITALS
TEMPERATURE: 96.8 F | BODY MASS INDEX: 32.61 KG/M2 | HEIGHT: 64 IN | WEIGHT: 191 LBS | DIASTOLIC BLOOD PRESSURE: 77 MMHG | HEART RATE: 82 BPM | OXYGEN SATURATION: 99 % | SYSTOLIC BLOOD PRESSURE: 112 MMHG | RESPIRATION RATE: 16 BRPM

## 2022-04-12 DIAGNOSIS — Z12.12 SCREENING FOR MALIGNANT NEOPLASM OF THE RECTUM: ICD-10-CM

## 2022-04-12 LAB
GLUCOSE BLD-MCNC: 115 MG/DL (ref 70–99)
PERFORMED ON: ABNORMAL

## 2022-04-12 PROCEDURE — 2580000003 HC RX 258: Performed by: NURSE ANESTHETIST, CERTIFIED REGISTERED

## 2022-04-12 PROCEDURE — 7100000011 HC PHASE II RECOVERY - ADDTL 15 MIN: Performed by: INTERNAL MEDICINE

## 2022-04-12 PROCEDURE — 6360000002 HC RX W HCPCS: Performed by: NURSE ANESTHETIST, CERTIFIED REGISTERED

## 2022-04-12 PROCEDURE — 2580000003 HC RX 258: Performed by: ANESTHESIOLOGY

## 2022-04-12 PROCEDURE — 2709999900 HC NON-CHARGEABLE SUPPLY: Performed by: INTERNAL MEDICINE

## 2022-04-12 PROCEDURE — 3700000000 HC ANESTHESIA ATTENDED CARE: Performed by: INTERNAL MEDICINE

## 2022-04-12 PROCEDURE — 3700000001 HC ADD 15 MINUTES (ANESTHESIA): Performed by: INTERNAL MEDICINE

## 2022-04-12 PROCEDURE — 7100000010 HC PHASE II RECOVERY - FIRST 15 MIN: Performed by: INTERNAL MEDICINE

## 2022-04-12 PROCEDURE — 2500000003 HC RX 250 WO HCPCS: Performed by: NURSE ANESTHETIST, CERTIFIED REGISTERED

## 2022-04-12 PROCEDURE — 88305 TISSUE EXAM BY PATHOLOGIST: CPT

## 2022-04-12 PROCEDURE — 3609010600 HC COLONOSCOPY POLYPECTOMY SNARE/COLD BIOPSY: Performed by: INTERNAL MEDICINE

## 2022-04-12 RX ORDER — SODIUM CHLORIDE 0.9 % (FLUSH) 0.9 %
5-40 SYRINGE (ML) INJECTION PRN
Status: DISCONTINUED | OUTPATIENT
Start: 2022-04-12 | End: 2022-04-12 | Stop reason: HOSPADM

## 2022-04-12 RX ORDER — PROPOFOL 10 MG/ML
INJECTION, EMULSION INTRAVENOUS PRN
Status: DISCONTINUED | OUTPATIENT
Start: 2022-04-12 | End: 2022-04-12 | Stop reason: SDUPTHER

## 2022-04-12 RX ORDER — SODIUM CHLORIDE 0.9 % (FLUSH) 0.9 %
5-40 SYRINGE (ML) INJECTION EVERY 12 HOURS SCHEDULED
Status: DISCONTINUED | OUTPATIENT
Start: 2022-04-12 | End: 2022-04-12 | Stop reason: HOSPADM

## 2022-04-12 RX ORDER — MIDAZOLAM HYDROCHLORIDE 1 MG/ML
2 INJECTION INTRAMUSCULAR; INTRAVENOUS
Status: DISCONTINUED | OUTPATIENT
Start: 2022-04-12 | End: 2022-04-12 | Stop reason: HOSPADM

## 2022-04-12 RX ORDER — LIDOCAINE HYDROCHLORIDE 20 MG/ML
INJECTION, SOLUTION INFILTRATION; PERINEURAL PRN
Status: DISCONTINUED | OUTPATIENT
Start: 2022-04-12 | End: 2022-04-12 | Stop reason: SDUPTHER

## 2022-04-12 RX ORDER — SODIUM CHLORIDE 9 MG/ML
25 INJECTION, SOLUTION INTRAVENOUS PRN
Status: DISCONTINUED | OUTPATIENT
Start: 2022-04-12 | End: 2022-04-12 | Stop reason: HOSPADM

## 2022-04-12 RX ORDER — SODIUM CHLORIDE, SODIUM LACTATE, POTASSIUM CHLORIDE, CALCIUM CHLORIDE 600; 310; 30; 20 MG/100ML; MG/100ML; MG/100ML; MG/100ML
INJECTION, SOLUTION INTRAVENOUS CONTINUOUS
Status: DISCONTINUED | OUTPATIENT
Start: 2022-04-12 | End: 2022-04-12 | Stop reason: HOSPADM

## 2022-04-12 RX ORDER — SODIUM CHLORIDE, SODIUM LACTATE, POTASSIUM CHLORIDE, CALCIUM CHLORIDE 600; 310; 30; 20 MG/100ML; MG/100ML; MG/100ML; MG/100ML
INJECTION, SOLUTION INTRAVENOUS CONTINUOUS PRN
Status: DISCONTINUED | OUTPATIENT
Start: 2022-04-12 | End: 2022-04-12 | Stop reason: SDUPTHER

## 2022-04-12 RX ADMIN — PROPOFOL 50 MG: 10 INJECTION, EMULSION INTRAVENOUS at 09:19

## 2022-04-12 RX ADMIN — PROPOFOL 100 MG: 10 INJECTION, EMULSION INTRAVENOUS at 09:05

## 2022-04-12 RX ADMIN — SODIUM CHLORIDE, SODIUM LACTATE, POTASSIUM CHLORIDE, AND CALCIUM CHLORIDE: .6; .31; .03; .02 INJECTION, SOLUTION INTRAVENOUS at 08:58

## 2022-04-12 RX ADMIN — PROPOFOL 100 MG: 10 INJECTION, EMULSION INTRAVENOUS at 09:14

## 2022-04-12 RX ADMIN — PROPOFOL 100 MG: 10 INJECTION, EMULSION INTRAVENOUS at 09:09

## 2022-04-12 RX ADMIN — LIDOCAINE HYDROCHLORIDE 100 MG: 20 INJECTION, SOLUTION INFILTRATION; PERINEURAL at 09:04

## 2022-04-12 RX ADMIN — SODIUM CHLORIDE, POTASSIUM CHLORIDE, SODIUM LACTATE AND CALCIUM CHLORIDE: 600; 310; 30; 20 INJECTION, SOLUTION INTRAVENOUS at 08:37

## 2022-04-12 RX ADMIN — PROPOFOL 100 MG: 10 INJECTION, EMULSION INTRAVENOUS at 09:04

## 2022-04-12 ASSESSMENT — ENCOUNTER SYMPTOMS: SHORTNESS OF BREATH: 1

## 2022-04-12 ASSESSMENT — PULMONARY FUNCTION TESTS
PIF_VALUE: 0
PIF_VALUE: 0
PIF_VALUE: 1
PIF_VALUE: 0
PIF_VALUE: 1
PIF_VALUE: 1

## 2022-04-12 ASSESSMENT — LIFESTYLE VARIABLES: SMOKING_STATUS: 0

## 2022-04-12 ASSESSMENT — PAIN SCALES - GENERAL
PAINLEVEL_OUTOF10: 0

## 2022-04-12 NOTE — PROCEDURES
Colonoscopy REPORT    Patient:  Gillian Crooks                  1960    Referring Physician:  Tyrone Hawk    Endoscopist: Dolores Saldaña     Indication:  Surveillance - s/p EMR of large ascending polyp     Medications:  MAC      Pre-Anesthesia Assessment:  I have reviewed and am in agreement with patient history and medication, including previous response to sedation. Prior to the procedure, a History and Physical was performed, and patient medications and allergies were reviewed. The risks and benefits of the procedure and the sedation options and risks were discussed with the patient. Complications included but were not limited to infection, bleeding, perforation, death, and missed lesions. All questions were answered and informed consent was obtained by the patient. Patient identification and proposed procedure were verified by the physician and the nurse in the pre-procedure area and in the procedure room. Mallampatti: II  ASA Grade Assessment: 3    After reviewing the risks and benefits, the patient was deemed in satisfactory condition to undergo the procedure. The anesthesia plan was to use MAC sedation. Immediately prior to administration of medications, the patient was re-assessed for adequacy to receive sedatives and a time out was performed. Patient and healthcare providers were in agreement it was the correct patient and procedure. The heart rate, respiratory rate, oxygen saturations, blood pressure, adequacy of pulmonary ventilation, and response to care were monitored throughout the procedure. The physical status of the patient was re-assessed after the procedure. After adequate sedation was achieved in stepwise fashion a rectal examination was performed and showed hemorrhoids. The pediatric colonoscope was then advanced atraumatically into the rectum and advanced without difficulty to the cecum.   The cecum was identified by the appendiceal orifice the ileocecal valve and other normal anatomy and a picture was obtained. The scope was then withdrawn (>6minutes) with close inspection of the mucosa in a circumferential manor. TI normal. Retroflexed views under the ICV and of the right colon obtained. Retroflexed and anteflexed views of the polypectomy site in the ascending colon obtained. White light and NBI used. 3mm focus of adenoma and multiple nodular areas that appeared to be from clips. All removed with cold snare. 1mm and 3mm polyps removed with cold snare. Retroflexed views of the rectum show hemorrhoids. No immediate complications. The preparation was good. Estimated blood loss none    Impression:  2 small polyps  Small focus of residual adenoma removed from EMR site in ascending colon  Plan:  The patient is aware it is their responsibility to call for biopsy results in 7 days. Repeat colonoscopy in 3 years.

## 2022-04-12 NOTE — PROGRESS NOTES
Ambulatory Surgery/Procedure Discharge Note    Patient tolerated procedure well. Patient denies nausea, cramping or pain post procedure. Discharge instructions and education reviewed with patient and spouse. Written instructions provided at discharge. Patient discharged ambulatory in wheelchair to car. Spouse to drive pt home. Vitals:    04/12/22 0945   BP: 112/77   Pulse: 82   Resp: 16   Temp:    SpO2: 99%       In: 500 [I.V.:500]  Out: -     Restroom use offered before discharge. Yes    Pain assessment:  none  Pain Level: 0        Patient discharged to home/self care.  Patient discharged via wheel chair by transporter to waiting family/S.O.     1015  4/12/2022 1015 AM

## 2022-04-12 NOTE — ANESTHESIA PRE PROCEDURE
Department of Anesthesiology  Preprocedure Note       Name:  Melvin Hanna   Age:  65 y.o.  :  1960                                          MRN:  [id number]         Date:  2022      Surgeon: Odette Moulton):  Samantha Herrera MD    Procedure: Procedure(s):  COLONOSCOPY    Medications prior to admission:   Prior to Admission medications    Medication Sig Start Date End Date Taking? Authorizing Provider   omeprazole (PRILOSEC) 40 MG delayed release capsule Take 1 capsule by mouth daily 22   Marin Luke MD   montelukast (SINGULAIR) 10 MG tablet Take 1 tablet by mouth nightly 3/31/22   Ene Hope MD   SUPREP BOWEL PREP KIT 17.5-3.13-1.6 GM/177ML SOLN solution  3/29/22   Historical Provider, MD   APPLE CIDER VINEGAR PO Take by mouth    Historical Provider, MD   Nutritional Supplements (ESTROVEN NIGHTTIME PO) Take by mouth    Historical Provider, MD   traMADol (ULTRAM) 50 MG tablet Take 50 mg by mouth every 6 hours as needed for Pain.     Historical Provider, MD   fluticasone (FLONASE) 50 MCG/ACT nasal spray 1 spray by Each Nostril route daily    Historical Provider, MD   budesonide-formoterol (SYMBICORT) 160-4.5 MCG/ACT AERO Inhale 2 puffs into the lungs 2 times daily    Historical Provider, MD   amLODIPine (NORVASC) 10 MG tablet TAKE ONE TABLET BY MOUTH NIGHTLY 3/31/22   Marin Luke MD   lisinopril (PRINIVIL;ZESTRIL) 10 MG tablet Take 1 tablet by mouth daily 3/31/22   Marin Luke MD   FLUoxetine (PROZAC) 40 MG capsule TAKE 1 CAPSULE BY MOUTH ONE TIME A DAY 3/31/22   Marin Luke MD   levothyroxine (SYNTHROID) 175 MCG tablet Take 1 tablet by mouth every other day 3/28/22   Cass Iqbal MD   levothyroxine (SYNTHROID) 150 MCG tablet Take 1 tablet by mouth every other day 3/28/22   Cass Iqbal MD   Misc Natural Products (GREEN TEA) TABS Take 1 tablet by mouth    Historical Provider, MD   Multiple Vitamins-Minerals (THERAPEUTIC MULTIVITAMIN-MINERALS) tablet Take 1 tablet by mouth daily  midazolam (VERSED) injection 2 mg  2 mg IntraVENous Once PRN Manjula Number, DO           Allergies: Allergies   Allergen Reactions    Zofran [Ondansetron]      elongated qt wave    Naproxen      weakness    Penicillins      Unknown     Demerol Hcl [Meperidine] Rash    Sulfa Antibiotics Nausea And Vomiting       Problem List:    Patient Active Problem List   Diagnosis Code    Raynaud's disease I73.00    Benign essential HTN I10    Menopausal hot flushes N95.1    Fibromyalgia M79.7    Hx of thyroid cancer Z85.850    Hypothyroidism, postsurgical E89.0    Class 1 obesity with body mass index (BMI) of 33.0 to 33.9 in adult E66.9, Z68.33    Prediabetes R73.03    Pure hypercholesterolemia E78.00       Past Medical History:        Diagnosis Date    Asthma     Carpal tunnel syndrome     Ganglion cyst     GERD (gastroesophageal reflux disease)     Hypertension     PONV (postoperative nausea and vomiting)     Prediabetes 2022    Thyroid cancer ([facility] [address] 2021       Past Surgical History:        Procedure Laterality Date    BREAST LUMPECTOMY      BUNIONECTOMY Bilateral     CARPAL TUNNEL RELEASE      CHOLECYSTECTOMY      COLONOSCOPY  2021    COLONOSCOPY POLYPECTOMY ABLATION performed by Ana Wong MD at [address] [place] [address]  2021    [place]ONOSCOPY W/ ENDOSCOPIC MUCOSAL [place] [facility] [place]NOSTIC      GALLBLADDER SURGERY      HYSTERECTOMY, VAGINAL      SHOULDER SURGERY Right     TONSILLECTOMY      WISDOM TOOTH EXTRACTION         Social History:    Social History     Tobacco Use    Smoking status: Former Smoker     Packs/day: 0.50     Years: 10.00     Pack years: 5.00     Types: Cigarettes     Quit date:      Years since quittin.2    Smokeless tobacco: Never Used   Substance Use Topics    Alcohol use:  Yes     Alcohol/week: 14.0 standard drinks     Types: 14 Glasses of wine per week                                Counseling given: Not Answered      Vital Signs (Current): There were no vitals filed for this visit. BP Readings from Last 3 Encounters:   04/12/22 114/79   03/31/22 (!) 148/86   03/24/22 (!) 144/84       NPO Status:                                                                                 BMI:   Wt Readings from Last 3 Encounters:   04/12/22 191 lb (86.6 kg)   03/31/22 196 lb 3.2 oz (89 kg)   03/24/22 197 lb (89.4 kg)     There is no height or weight on file to calculate BMI.    CBC:   Lab Results   Component Value Date    WBC 5.3 03/31/2022    RBC 4.78 03/31/2022    HGB 14.3 03/31/2022    HCT 43.8 03/31/2022    MCV 91.7 03/31/2022    RDW 13.0 03/31/2022     03/31/2022       CMP:   Lab Results   Component Value Date     03/31/2022    K 4.6 03/31/2022     03/31/2022    CO2 22 03/31/2022    BUN 19 03/31/2022    CREATININE 0.6 03/31/2022    GFRAA >60 03/31/2022    AGRATIO 2.2 03/31/2022    LABGLOM >60 03/31/2022    GLUCOSE 101 03/31/2022    PROT 7.0 03/31/2022    CALCIUM 9.4 03/31/2022    BILITOT 0.6 03/31/2022    ALKPHOS 99 03/31/2022    AST 39 03/31/2022    ALT 40 03/31/2022       POC Tests: No results for input(s): POCGLU, POCNA, POCK, POCCL, POCBUN, POCHEMO, POCHCT in the last 72 hours. Coags: No results found for: PROTIME, INR, APTT    HCG (If Applicable): No results found for: PREGTESTUR, PREGSERUM, HCG, HCGQUANT     ABGs: No results found for: PHART, PO2ART, TPK6GVM, EXH5GHI, BEART, J5JIXOKY     Type & Screen (If Applicable):  No results found for: LABABO, LABRH    Drug/Infectious Status (If Applicable):  No results found for: HIV, HEPCAB    COVID-19 Screening (If Applicable):   Lab Results   Component Value Date    COVID19 Not Detected 11/11/2021           Anesthesia Evaluation  Patient summary reviewed and Nursing notes reviewed   history of anesthetic complications: PONV.   Airway: Mallampati:

## 2022-04-12 NOTE — H&P
Pre-operative History and Physical    Patient: Andrés Briggs  : 1960     History Obtained From:  patient, electronic medical record    HISTORY OF PRESENT ILLNESS:    The patient is a 65 y.o. female who presents for a colonoscopy for follow up large polyp in ascending colon. Past Medical History:        Diagnosis Date    Asthma     Carpal tunnel syndrome     Ganglion cyst     GERD (gastroesophageal reflux disease)     Hypertension     PONV (postoperative nausea and vomiting)     Prediabetes 2022    Thyroid cancer ([address] 2021     Past Surgical History:        Procedure Laterality Date    BREAST LUMPECTOMY      BUNIONECTOMY Bilateral     CARPAL TUNNEL RELEASE      CHOLECYSTECTOMY      COLONOSCOPY  2021    COLONOSCOPY POLYPECTOMY ABLATION performed by Shannan Samayoa MD at [address] [place] [address]  2021    COLONOSCOPY W/ ENDOSCOPIC MUCOSAL RESECTION performed by Shannan Samayoa MD at [facility], COLON, DIAGNOSTIC      GALLBLADDER SURGERY      HYSTERECTOMY, VAGINAL      SHOULDER SURGERY Right     TONSILLECTOMY      WISDOM TOOTH EXTRACTION       Medications Prior to Admission:   No current facility-administered medications on file prior to encounter.      Current Outpatient Medications on File Prior to Encounter   Medication Sig Dispense Refill    Misc Natural Products (GREEN TEA) TABS Take 1 tablet by mouth      Multiple Vitamins-Minerals (THERAPEUTIC MULTIVITAMIN-MINERALS) tablet Take 1 tablet by mouth daily      albuterol sulfate HFA (PROAIR HFA) 108 (90 Base) MCG/ACT inhaler Inhale 2 puffs into the lungs every 4 hours as needed      Brindall Asencio-Chromium Kassandra 500-0.2 MG TABS Take by mouth      Calcium Carb-Cholecalciferol 500-600 MG-UNIT TABS Take by mouth every evening      vitamin D (VITAMIN D-1000 MAX ST) 25 MCG (1000 UT) TABS tablet Take 1,000 Units by mouth daily      Cranberry 200 MG CAPS Take by mouth      cyclobenzaprine (FLEXERIL) 10 MG tablet Take 10 mg by mouth 3 times daily as needed      Ginkgo 60 MG TABS Take 2 tablets by mouth 2 times daily      Ginseng 100 MG CAPS Take by mouth      Glucosamine 500 MG CAPS Take 500 mg by mouth daily      loratadine (CLARITIN) 10 MG tablet Take 10 mg by mouth daily      melatonin 3 MG TABS tablet Take 3 mg by mouth nightly      senna (SENOKOT) 8.6 MG TABS tablet Take 4 tablets by mouth as needed       Flaxseed, Linseed, (FLAX SEED OIL PO) Take by mouth daily       Allergies:  Zofran [ondansetron], Naproxen, Penicillins, Demerol hcl [meperidine], and Sulfa antibiotics    History of allergic reaction to anesthesia:  No    Social History:   TOBACCO:   reports that she quit smoking about 32 years ago. Her smoking use included cigarettes. She has a 5.00 pack-year smoking history. She has never used smokeless tobacco.  ETOH:   reports previous alcohol use of about 14.0 standard drinks of alcohol per week. DRUGS:   reports no history of drug use. Family History:       Problem Relation Age of Onset   Garibay Cancer Mother         lung       PHYSICAL EXAM:      /79   Pulse 87   Temp 96.8 °F (36 °C) (Temporal)   Resp 16   Ht 5' 4\" (1.626 m)   Wt 191 lb (86.6 kg)   LMP  (LMP Unknown)   SpO2 98%   BMI 32.79 kg/m²  I        Heart:  No m/r/g +s1/s2 RRR    Lungs:  CTA bilaterally    Abdomen:  Soft, nontender, non distended; +bs    ASA Grade:  ASA 3 - Patient with moderate systemic disease with functional limitations    Mallampati Class:  Class I: Soft palate, uvula, fauces, pillars visible  __________  Class II: Soft palate, uvula, fauces visible  ____x______   Class III: Soft palate, base of uvula visible  _________  Class IV: Hard palate only visible   __________      ASSESSMENT AND PLAN:    1. Patient is a 65 y.o. female here for colonoscopy with deep sedation  2. Procedure options, risks and benefits reviewed with patient.   We specifically discussed that risks include, but are not limited to infection, bleeding, perforation, death, and missed lesions. Patient expresses understanding.

## 2022-04-12 NOTE — ANESTHESIA POSTPROCEDURE EVALUATION
Department of Anesthesiology  Postprocedure Note    Patient: Robin Sheppard  MRN: [id number]  YOB: 1960  Date of evaluation: 4/12/2022  Time:  10:08 AM     Procedure Summary     Date: 04/12/22 Room / Location: [facility]    Anesthesia Start: 5490 Anesthesia Stop:     Procedure: COLONOSCOPY POLYPECTOMY SNARE/COLD BIOPSY Diagnosis:       Screening for malignant neoplasm of the rectum      (Screening for malignant neoplasm of the rectum [Z12.12])    Surgeons: Vikram Anglin MD Responsible Provider: Mary Ellen Johnson DO    Anesthesia Type: MAC ASA Status: 2          Anesthesia Type: No value filed. Mingo Phase I: Mingo Score: 10    Mnigo Phase II: Mingo Score: 9    Last vitals: Reviewed and per EMR flowsheets.        Anesthesia Post Evaluation    Patient location during evaluation: PACU  Patient participation: complete - patient participated  Level of consciousness: awake and alert  Airway patency: patent  Nausea & Vomiting: no nausea and no vomiting  Cardiovascular status: blood pressure returned to baseline  Respiratory status: acceptable  Hydration status: euvolemic

## 2022-06-10 ENCOUNTER — OFFICE VISIT (OUTPATIENT)
Dept: PRIMARY CARE CLINIC | Age: 62
End: 2022-06-10
Payer: COMMERCIAL

## 2022-06-10 VITALS
TEMPERATURE: 97.1 F | BODY MASS INDEX: 33.49 KG/M2 | HEIGHT: 64 IN | DIASTOLIC BLOOD PRESSURE: 82 MMHG | WEIGHT: 196.2 LBS | HEART RATE: 73 BPM | OXYGEN SATURATION: 96 % | SYSTOLIC BLOOD PRESSURE: 132 MMHG | RESPIRATION RATE: 16 BRPM

## 2022-06-10 DIAGNOSIS — E78.00 PURE HYPERCHOLESTEROLEMIA: ICD-10-CM

## 2022-06-10 DIAGNOSIS — R73.03 PREDIABETES: ICD-10-CM

## 2022-06-10 DIAGNOSIS — I10 BENIGN ESSENTIAL HTN: Primary | ICD-10-CM

## 2022-06-10 DIAGNOSIS — Z23 NEED FOR VACCINATION: ICD-10-CM

## 2022-06-10 PROCEDURE — 99214 OFFICE O/P EST MOD 30 MIN: CPT | Performed by: FAMILY MEDICINE

## 2022-06-10 PROCEDURE — 90471 IMMUNIZATION ADMIN: CPT | Performed by: FAMILY MEDICINE

## 2022-06-10 PROCEDURE — 90715 TDAP VACCINE 7 YRS/> IM: CPT | Performed by: FAMILY MEDICINE

## 2022-06-10 RX ORDER — LOSARTAN POTASSIUM 25 MG/1
25 TABLET ORAL DAILY
Qty: 90 TABLET | Refills: 1 | Status: SHIPPED | OUTPATIENT
Start: 2022-06-10 | End: 2022-09-11 | Stop reason: SDUPTHER

## 2022-06-10 ASSESSMENT — PATIENT HEALTH QUESTIONNAIRE - PHQ9
SUM OF ALL RESPONSES TO PHQ9 QUESTIONS 1 & 2: 0
2. FEELING DOWN, DEPRESSED OR HOPELESS: 0
SUM OF ALL RESPONSES TO PHQ QUESTIONS 1-9: 0
1. LITTLE INTEREST OR PLEASURE IN DOING THINGS: 0
SUM OF ALL RESPONSES TO PHQ QUESTIONS 1-9: 0

## 2022-06-10 NOTE — PROGRESS NOTES
PROGRESS NOTE  Date of Service:  6/10/2022    SUBJECTIVE:  Patient ID: Marco A Beckman is a 65 y.o. female    HPI:     History of thyroid cancer; s/p thyroidectomy. Follows with endocrinology     Hypertension- Blood pressure improved with new medication  Denies headache, chest pain, edema    Hyperlipidemia- no previous medications  The 10-year ASCVD risk score (Carlos Eduardo Alexis, et al., 2013) is: 6.4%    Values used to calculate the score:      Age: 65 years      Sex: Female      Is Non- : No      Diabetic: No      Tobacco smoker: No      Systolic Blood Pressure: 424 mmHg      Is BP treated: Yes      HDL Cholesterol: 75 mg/dL      Total Cholesterol: 255 mg/dL    Elevated LFT in the past    Asthma-  On singulair and symbicort with rare use of albuterol  Has completed pneumovax    Depression- on fluoxetine and feels symptoms are controlled. Increased stressors and fatigue with work hours which can affect symptoms. 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    Patient's medications, allergies, past medical, surgical, social and family histories were reviewed and updated as appropriate. Review of Systems   Musculoskeletal:        Did not see hand surg. Some improvement but symptoms do continue at right hand/wrist   All other systems reviewed and are negative. OBJECTIVE:  Vitals:    06/10/22 1102 06/10/22 1128   BP: (!) 142/88 132/82   Site: Left Upper Arm    Position: Sitting    Cuff Size: Large Adult    Pulse: 73    Resp: 16    Temp: 97.1 °F (36.2 °C)    TempSrc: Temporal    SpO2: 96%    Weight: 196 lb 3.2 oz (89 kg)    Height: 5' 4\" (1.626 m)       Body mass index is 33.68 kg/m². Physical Exam  Vitals reviewed. Constitutional:       Appearance: Normal appearance. HENT:      Head: Normocephalic and atraumatic. Eyes:      General: No scleral icterus.      Conjunctiva/sclera: Conjunctivae normal.   Cardiovascular:      Rate and Rhythm: Normal rate and regular rhythm. Heart sounds: Normal heart sounds. No murmur heard. Pulmonary:      Effort: Pulmonary effort is normal.      Breath sounds: Normal breath sounds. Musculoskeletal:      Right lower leg: No edema. Left lower leg: No edema. Lymphadenopathy:      Cervical: No cervical adenopathy. Neurological:      General: No focal deficit present. Mental Status: She is alert and oriented to person, place, and time. Psychiatric:         Attention and Perception: Attention and perception normal.         Mood and Affect: Mood and affect normal.         Speech: Speech normal.         Behavior: Behavior normal. Behavior is cooperative. Thought Content: Thought content normal.         Cognition and Memory: Cognition and memory normal.         Judgment: Judgment normal.         ASSESSMENT:  1. Benign essential HTN    2. Prediabetes    3. Pure hypercholesterolemia    4. Need for vaccination         PLAN:   1. Benign essential HTN  Assessment & Plan:   Improved control with addition of losartan. Continue amlodipine as well. Blood pressure is well controlled. Continue medication regimen. Recommend home blood pressure monitoring. Recommend low sodium diet, at least 150 minutes of cardiovascular exercise each week. Recommend weight loss. Orders:  -     losartan (COZAAR) 25 MG tablet; Take 1 tablet by mouth daily, Disp-90 tablet, R-1Normal  2. Prediabetes  Assessment & Plan:  Recommend dietary changes and 150 minutes cardiovascular exercise per week. 3. Pure hypercholesterolemia  Assessment & Plan:  Recommend dietary changes and 150 minutes cardiovascular exercise per week. The 10-year ASCVD risk score (Jalil Chen et al., 2013) is: 5.1%    Values used to calculate the score:      Age: 65 years      Sex: Female      Is Non- : No      Diabetic: No      Tobacco smoker: No      Systolic Blood Pressure: 413 mmHg      Is BP treated:  Yes HDL Cholesterol: 75 mg/dL      Total Cholesterol: 255 mg/dL\  4. Need for vaccination  -     Tdap, BOOSTRIX, (age 8 yrs+), IM       Return in about 3 months (around 9/6/2022). Electronically signed by Amilcar Jean MD on 6/10/2022 at 6:10 PM.    Please note this chart was generated using dragon dictation software. Although every effort was made to ensure the accuracy of this automated transcription, some errors in transcription may have occurred.

## 2022-06-12 NOTE — ASSESSMENT & PLAN NOTE
Recommend dietary changes and 150 minutes cardiovascular exercise per week.       The 10-year ASCVD risk score (Dhara Ashby et al., 2013) is: 5.1%    Values used to calculate the score:      Age: 65 years      Sex: Female      Is Non- : No      Diabetic: No      Tobacco smoker: No      Systolic Blood Pressure: 948 mmHg      Is BP treated: Yes      HDL Cholesterol: 75 mg/dL      Total Cholesterol: 255 mg/dL\

## 2022-06-12 NOTE — ASSESSMENT & PLAN NOTE
Improved control with addition of losartan. Continue amlodipine as well. Blood pressure is well controlled. Continue medication regimen. Recommend home blood pressure monitoring. Recommend low sodium diet, at least 150 minutes of cardiovascular exercise each week. Recommend weight loss.

## 2022-06-14 DIAGNOSIS — I10 BENIGN ESSENTIAL HTN: ICD-10-CM

## 2022-06-14 RX ORDER — AMLODIPINE BESYLATE 10 MG/1
TABLET ORAL
Qty: 30 TABLET | Refills: 3 | Status: CANCELLED | OUTPATIENT
Start: 2022-06-14

## 2022-06-14 RX ORDER — AMLODIPINE BESYLATE 10 MG/1
TABLET ORAL
Qty: 90 TABLET | Refills: 1 | Status: SHIPPED | OUTPATIENT
Start: 2022-06-14 | End: 2022-09-11 | Stop reason: SDUPTHER

## 2022-06-14 RX ORDER — OMEPRAZOLE 40 MG/1
CAPSULE, DELAYED RELEASE ORAL
Qty: 90 CAPSULE | Refills: 1 | Status: SHIPPED | OUTPATIENT
Start: 2022-06-14

## 2022-06-14 RX ORDER — MONTELUKAST SODIUM 10 MG/1
TABLET ORAL
Qty: 90 TABLET | Refills: 1 | Status: SHIPPED | OUTPATIENT
Start: 2022-06-14 | End: 2022-09-11 | Stop reason: SDUPTHER

## 2022-06-14 NOTE — TELEPHONE ENCOUNTER
Medication:   Requested Prescriptions     Pending Prescriptions Disp Refills    montelukast (SINGULAIR) 10 MG tablet [Pharmacy Med Name: MONTELUKAST SODIUM 10MG TABS] 30 tablet 3     Sig: TAKE ONE TABLET BY MOUTH NIGHTLY    omeprazole (PRILOSEC) 40 MG delayed release capsule [Pharmacy Med Name: OMEPRAZOLE 40MG CPDR] 30 capsule 3     Sig: TAKE ONE CAPSULE BY MOUTH EVERY MORNING BEFORE BREAKFAST    amLODIPine (NORVASC) 10 MG tablet [Pharmacy Med Name: AMLODIPINE BESYLATE 10MG TABS] 30 tablet 3     Sig: TAKE ONE TABLET BY MOUTH NIGHTLY     Last Filled: 3.21.22 4.4.22 3.31.22    Last appt: 11/11/2021   Next appt: Visit date not found    Last OARRS: No flowsheet data found.

## 2022-09-08 ENCOUNTER — OFFICE VISIT (OUTPATIENT)
Dept: PRIMARY CARE CLINIC | Age: 62
End: 2022-09-08
Payer: COMMERCIAL

## 2022-09-08 VITALS
HEIGHT: 64 IN | OXYGEN SATURATION: 98 % | DIASTOLIC BLOOD PRESSURE: 82 MMHG | TEMPERATURE: 97.4 F | HEART RATE: 76 BPM | WEIGHT: 194 LBS | RESPIRATION RATE: 18 BRPM | SYSTOLIC BLOOD PRESSURE: 136 MMHG | BODY MASS INDEX: 33.12 KG/M2

## 2022-09-08 DIAGNOSIS — I10 BENIGN ESSENTIAL HTN: ICD-10-CM

## 2022-09-08 DIAGNOSIS — M79.672 LEFT FOOT PAIN: ICD-10-CM

## 2022-09-08 DIAGNOSIS — Z00.00 EXAMINATION, MEDICAL, GENERAL: Primary | ICD-10-CM

## 2022-09-08 DIAGNOSIS — E78.00 PURE HYPERCHOLESTEROLEMIA: ICD-10-CM

## 2022-09-08 DIAGNOSIS — J45.30 MILD PERSISTENT ASTHMA WITHOUT COMPLICATION: ICD-10-CM

## 2022-09-08 DIAGNOSIS — R73.03 PREDIABETES: ICD-10-CM

## 2022-09-08 DIAGNOSIS — R42 VERTIGO: ICD-10-CM

## 2022-09-08 DIAGNOSIS — F33.0 MILD EPISODE OF RECURRENT MAJOR DEPRESSIVE DISORDER (HCC): ICD-10-CM

## 2022-09-08 LAB
A/G RATIO: 2.2 (ref 1.1–2.2)
ALBUMIN SERPL-MCNC: 4.8 G/DL (ref 3.4–5)
ALP BLD-CCNC: 97 U/L (ref 40–129)
ALT SERPL-CCNC: 25 U/L (ref 10–40)
ANION GAP SERPL CALCULATED.3IONS-SCNC: 13 MMOL/L (ref 3–16)
AST SERPL-CCNC: 28 U/L (ref 15–37)
BASOPHILS ABSOLUTE: 0.1 K/UL (ref 0–0.2)
BASOPHILS RELATIVE PERCENT: 1.2 %
BILIRUB SERPL-MCNC: 0.4 MG/DL (ref 0–1)
BUN BLDV-MCNC: 17 MG/DL (ref 7–20)
CALCIUM SERPL-MCNC: 9.2 MG/DL (ref 8.3–10.6)
CHLORIDE BLD-SCNC: 100 MMOL/L (ref 99–110)
CHOLESTEROL, TOTAL: 249 MG/DL (ref 0–199)
CO2: 26 MMOL/L (ref 21–32)
CREAT SERPL-MCNC: 0.6 MG/DL (ref 0.6–1.2)
EOSINOPHILS ABSOLUTE: 0.2 K/UL (ref 0–0.6)
EOSINOPHILS RELATIVE PERCENT: 3.7 %
GFR AFRICAN AMERICAN: >60
GFR NON-AFRICAN AMERICAN: >60
GLUCOSE BLD-MCNC: 112 MG/DL (ref 70–99)
HCT VFR BLD CALC: 42.8 % (ref 36–48)
HDLC SERPL-MCNC: 81 MG/DL (ref 40–60)
HEMOGLOBIN: 14.4 G/DL (ref 12–16)
LDL CHOLESTEROL CALCULATED: 152 MG/DL
LYMPHOCYTES ABSOLUTE: 1.4 K/UL (ref 1–5.1)
LYMPHOCYTES RELATIVE PERCENT: 31.1 %
MCH RBC QN AUTO: 31.2 PG (ref 26–34)
MCHC RBC AUTO-ENTMCNC: 33.8 G/DL (ref 31–36)
MCV RBC AUTO: 92.3 FL (ref 80–100)
MONOCYTES ABSOLUTE: 0.3 K/UL (ref 0–1.3)
MONOCYTES RELATIVE PERCENT: 7.4 %
NEUTROPHILS ABSOLUTE: 2.5 K/UL (ref 1.7–7.7)
NEUTROPHILS RELATIVE PERCENT: 56.6 %
PDW BLD-RTO: 13.4 % (ref 12.4–15.4)
PLATELET # BLD: 286 K/UL (ref 135–450)
PMV BLD AUTO: 8.5 FL (ref 5–10.5)
POTASSIUM SERPL-SCNC: 4.5 MMOL/L (ref 3.5–5.1)
RBC # BLD: 4.64 M/UL (ref 4–5.2)
SODIUM BLD-SCNC: 139 MMOL/L (ref 136–145)
TOTAL PROTEIN: 7 G/DL (ref 6.4–8.2)
TRIGL SERPL-MCNC: 80 MG/DL (ref 0–150)
VLDLC SERPL CALC-MCNC: 16 MG/DL
WBC # BLD: 4.4 K/UL (ref 4–11)

## 2022-09-08 PROCEDURE — 99213 OFFICE O/P EST LOW 20 MIN: CPT | Performed by: FAMILY MEDICINE

## 2022-09-08 PROCEDURE — 36415 COLL VENOUS BLD VENIPUNCTURE: CPT | Performed by: FAMILY MEDICINE

## 2022-09-08 PROCEDURE — 99396 PREV VISIT EST AGE 40-64: CPT | Performed by: FAMILY MEDICINE

## 2022-09-08 RX ORDER — MECLIZINE HYDROCHLORIDE 25 MG/1
25 TABLET ORAL 3 TIMES DAILY PRN
Qty: 60 TABLET | Refills: 0 | Status: SHIPPED | OUTPATIENT
Start: 2022-09-08 | End: 2022-09-28

## 2022-09-08 NOTE — PROGRESS NOTES
9/8/2022      Isabelle Marvin is a 65 y.o. female, here for a preventive medicine evaluation. 2-week history of episodes of dizziness when she is bending over and standing back up this can also occur rolling over in bed. She does not notice it while driving. Denies lightheadedness as if she will pass out  Prior to this she did have onset of nasal congestion with left-sided ear pain. In the past she has had episodes of vertigo requiring treatment    Having left pain. She has had a few surgeries in the seems to be at some of the hardware. She needs a new referral to a covered podiatrist    History of thyroid cancer; s/p thyroidectomy. Follows with endocrinology     Hypertension- Blood pressure improved with new medication  Denies headache, chest pain, edema    Hyperlipidemia- no previous medications  The 10-year ASCVD risk score (Jaciel Ortiz, et al., 2013) is: 6.4%    Values used to calculate the score:      Age: 65 years      Sex: Female      Is Non- : No      Diabetic: No      Tobacco smoker: No      Systolic Blood Pressure: 472 mmHg      Is BP treated: Yes      HDL Cholesterol: 75 mg/dL      Total Cholesterol: 255 mg/dL        Asthma-  On singulair and symbicort with rare use of albuterol  Has completed pneumovax    Depression- on fluoxetine and feels symptoms are controlled. Increased stressors and fatigue with work hours which can affect symptoms.     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[employer]]      elongated qt wave    Naproxen      weakness    Penicillins      Unknown     Demerol Hcl [Meperidine] Rash    Sulfa Antibiotics Nausea And Vomiting     Past Medical History:   Diagnosis Date    Asthma     Carpal tunnel syndrome     Ganglion cyst     GERD (gastroesophageal reflux disease)     Hypertension     PONV (postoperative nausea and vomiting)     Prediabetes 4/1/2022 Thyroid cancer ([facility] [address] 6/7/2021     Past Surgical History:   Procedure Laterality Date    BREAST LUMPECTOMY      BUNIONECTOMY Bilateral     CARPAL TUNNEL RELEASE      CHOLECYSTECTOMY      COLONOSCOPY  11/02/2021    COLONOSCOPY POLYPECTOMY ABLATION performed by Maile Dey MD at [place] [address]  11/02/2021    COLONOSCOPY W/ ENDOSCOPIC MUCOSAL RESECTION performed by Maile Dey MD at [place] [address]  4/12/2022    COLONOSCOPY POLYPECTOMY SNARE/COLD BIOPSY performed by Maile Dey MD at [address] [place] [address], COLON, [address] [place] [address],[address], VAGINAL      SHOULDER SURGERY Right     TONSILLECTOMY      WISDOM TOOTH EXTRACTION       Family History   Problem Relation Age of Onset    Cancer Mother         lung       Review of Systems   All other systems reviewed and are negative. Vitals:    09/08/22 1036   BP: 136/82   Site: Right Upper Arm   Position: Sitting   Cuff Size: Large Adult   Pulse: 76   Resp: 18   Temp: 97.4 °F (36.3 °C)   TempSrc: Temporal   SpO2: 98%   Weight: 194 lb (88 kg)   Height: 5' 4\" (1.626 m)     Estimated body mass index is 33.3 kg/m² as calculated from the following:    Height as of this encounter: 5' 4\" (1.626 m). Weight as of this encounter: 194 lb (88 kg). Physical Exam  Vitals reviewed. Constitutional:       Appearance: Normal appearance. HENT:      Head: Normocephalic and atraumatic. Right Ear: Tympanic membrane, ear canal and external ear normal.      Left Ear: Tympanic membrane, ear canal and external ear normal.      Nose: Nose normal.      Mouth/Throat:      Mouth: Mucous membranes are moist.      Pharynx: Oropharynx is clear. Eyes:      General: No scleral icterus. Extraocular Movements: Extraocular movements intact. Conjunctiva/sclera: Conjunctivae normal.      Pupils: Pupils are equal, round, and reactive to light.       Comments: Nystagmus bilaterally on lateral gaze   Neck: Health Maintenance   Topic Date Due    HIV screen  Never done    Shingles vaccine (2 of 2) 12/17/2020    COVID-19 Vaccine (4 - Booster for Moderna series) 02/28/2022    Flu vaccine (1) 09/01/2022    Depression Screen  06/10/2023    Breast cancer screen  08/13/2023    A1C test (Diabetic or Prediabetic)  09/08/2023    Lipids  09/08/2027    Colorectal Cancer Screen  04/12/2032    DTaP/Tdap/Td vaccine (3 - Td or Tdap) 06/10/2032    Hepatitis C screen  Completed    Hepatitis A vaccine  Aged Out    Hepatitis B vaccine  Aged Out    Hib vaccine  Aged Out    Meningococcal (ACWY) vaccine  Aged Out    Pneumococcal 0-64 years Vaccine  Aged Out       Diagnoses and all orders for this visit:  Examination, medical, general  -     Comprehensive Metabolic Panel  -     CBC with Auto Differential  -     Lipid Panel  -     Hemoglobin A1C  Vertigo  Comments:  rest, increased fluids, exercises given, meclizine prn  Orders:  -     meclizine (ANTIVERT) 25 MG tablet; Take 1 tablet by mouth 3 times daily as needed for Dizziness  Benign essential HTN  -     losartan (COZAAR) 25 MG tablet; Take 1 tablet by mouth daily  -     amLODIPine (NORVASC) 10 MG tablet; Take 1 tablet by mouth daily  Prediabetes  Pure hypercholesterolemia  Mild persistent asthma without complication  -     montelukast (SINGULAIR) 10 MG tablet; TAKE ONE TABLET BY MOUTH NIGHTLY  Mild episode of recurrent major depressive disorder (HCC)  -     FLUoxetine (PROZAC) 40 MG capsule; TAKE 1 CAPSULE BY MOUTH ONE TIME A DAY  Left foot pain  -     AFL - Mary Colbert, Terrell Ramirez DPM, Podiatry, [facility]   Recommend completion of Shingrix vaccine series  Do recommend annual flu vaccine    An electronic signature was used to authenticate this note.     --Thalia Guardado MD on 9/11/2022 at 6:18 PM

## 2022-09-09 LAB
ESTIMATED AVERAGE GLUCOSE: 119.8 MG/DL
HBA1C MFR BLD: 5.8 %

## 2022-09-11 PROBLEM — F33.0 MILD EPISODE OF RECURRENT MAJOR DEPRESSIVE DISORDER (HCC): Status: ACTIVE | Noted: 2022-09-11

## 2022-09-11 PROBLEM — J45.30 MILD PERSISTENT ASTHMA WITHOUT COMPLICATION: Status: ACTIVE | Noted: 2022-09-11

## 2022-09-11 RX ORDER — AMLODIPINE BESYLATE 10 MG/1
TABLET ORAL
Qty: 90 TABLET | Refills: 1 | Status: SHIPPED | OUTPATIENT
Start: 2022-09-11

## 2022-09-11 RX ORDER — MONTELUKAST SODIUM 10 MG/1
TABLET ORAL
Qty: 90 TABLET | Refills: 1 | Status: SHIPPED | OUTPATIENT
Start: 2022-09-11

## 2022-09-11 RX ORDER — LOSARTAN POTASSIUM 25 MG/1
25 TABLET ORAL DAILY
Qty: 90 TABLET | Refills: 1 | Status: SHIPPED | OUTPATIENT
Start: 2022-09-11

## 2022-09-11 RX ORDER — FLUOXETINE HYDROCHLORIDE 40 MG/1
CAPSULE ORAL
Qty: 90 CAPSULE | Refills: 1 | Status: SHIPPED | OUTPATIENT
Start: 2022-09-11

## 2022-09-11 ASSESSMENT — PATIENT HEALTH QUESTIONNAIRE - PHQ9
1. LITTLE INTEREST OR PLEASURE IN DOING THINGS: 0
SUM OF ALL RESPONSES TO PHQ QUESTIONS 1-9: 0
SUM OF ALL RESPONSES TO PHQ9 QUESTIONS 1 & 2: 0
5. POOR APPETITE OR OVEREATING: 0
10. IF YOU CHECKED OFF ANY PROBLEMS, HOW DIFFICULT HAVE THESE PROBLEMS MADE IT FOR YOU TO DO YOUR WORK, TAKE CARE OF THINGS AT HOME, OR GET ALONG WITH OTHER PEOPLE: 0
8. MOVING OR SPEAKING SO SLOWLY THAT OTHER PEOPLE COULD HAVE NOTICED. OR THE OPPOSITE, BEING SO FIGETY OR RESTLESS THAT YOU HAVE BEEN MOVING AROUND A LOT MORE THAN USUAL: 0
3. TROUBLE FALLING OR STAYING ASLEEP: 0
7. TROUBLE CONCENTRATING ON THINGS, SUCH AS READING THE NEWSPAPER OR WATCHING TELEVISION: 0
SUM OF ALL RESPONSES TO PHQ QUESTIONS 1-9: 0
9. THOUGHTS THAT YOU WOULD BE BETTER OFF DEAD, OR OF HURTING YOURSELF: 0
2. FEELING DOWN, DEPRESSED OR HOPELESS: 0
SUM OF ALL RESPONSES TO PHQ QUESTIONS 1-9: 0
4. FEELING TIRED OR HAVING LITTLE ENERGY: 0
SUM OF ALL RESPONSES TO PHQ QUESTIONS 1-9: 0
6. FEELING BAD ABOUT YOURSELF - OR THAT YOU ARE A FAILURE OR HAVE LET YOURSELF OR YOUR FAMILY DOWN: 0

## 2022-12-21 DIAGNOSIS — E89.0 HYPOTHYROIDISM, POSTSURGICAL: ICD-10-CM

## 2022-12-21 DIAGNOSIS — C73 THYROID CANCER (HCC): ICD-10-CM

## 2022-12-21 LAB
T4 FREE: 1.4 NG/DL (ref 0.9–1.8)
TSH SERPL DL<=0.05 MIU/L-ACNC: 2.15 UIU/ML (ref 0.27–4.2)

## 2022-12-27 RX ORDER — CYCLOBENZAPRINE HCL 10 MG
TABLET ORAL
Qty: 30 TABLET | Refills: 0 | Status: SHIPPED | OUTPATIENT
Start: 2022-12-27

## 2022-12-27 NOTE — TELEPHONE ENCOUNTER
Medication:   Requested Prescriptions     Pending Prescriptions Disp Refills    cyclobenzaprine (FLEXERIL) 10 MG tablet [Pharmacy Med Name: CYCLOBENZAPRINE HCL 10MG TABS] 30 tablet 0     Sig: TAKE ONE TABLET BY MOUTH NIGHTLY        Last Filled:  09/23/2022 # 30    Patient Phone Number: [phone] (home)     Last appt: 9/8/2022   Next appt: Visit date not found    Last OARRS: No flowsheet data found.

## 2023-01-01 ENCOUNTER — HOSPITAL ENCOUNTER (EMERGENCY)
Age: 63
Discharge: HOME | End: 2023-01-01
Attending: EMERGENCY MEDICINE
Payer: COMMERCIAL

## 2023-01-01 VITALS
TEMPERATURE: 98.3 F | WEIGHT: 197.2 LBS | SYSTOLIC BLOOD PRESSURE: 172 MMHG | RESPIRATION RATE: 14 BRPM | DIASTOLIC BLOOD PRESSURE: 99 MMHG | HEIGHT: 64 IN | HEART RATE: 85 BPM | BODY MASS INDEX: 33.67 KG/M2 | OXYGEN SATURATION: 100 %

## 2023-01-01 DIAGNOSIS — M43.6 TORTICOLLIS: Primary | ICD-10-CM

## 2023-01-01 PROCEDURE — 6360000002 HC RX W HCPCS: Performed by: STUDENT IN AN ORGANIZED HEALTH CARE EDUCATION/TRAINING PROGRAM

## 2023-01-01 PROCEDURE — 99284 EMERGENCY DEPT VISIT MOD MDM: CPT

## 2023-01-01 PROCEDURE — 6370000000 HC RX 637 (ALT 250 FOR IP): Performed by: STUDENT IN AN ORGANIZED HEALTH CARE EDUCATION/TRAINING PROGRAM

## 2023-01-01 PROCEDURE — 96372 THER/PROPH/DIAG INJ SC/IM: CPT

## 2023-01-01 PROCEDURE — 2500000003 HC RX 250 WO HCPCS: Performed by: STUDENT IN AN ORGANIZED HEALTH CARE EDUCATION/TRAINING PROGRAM

## 2023-01-01 RX ORDER — DIAZEPAM 5 MG/1
5 TABLET ORAL ONCE
Status: COMPLETED | OUTPATIENT
Start: 2023-01-01 | End: 2023-01-01

## 2023-01-01 RX ORDER — LIDOCAINE HYDROCHLORIDE 10 MG/ML
5 INJECTION, SOLUTION EPIDURAL; INFILTRATION; INTRACAUDAL; PERINEURAL ONCE
Status: COMPLETED | OUTPATIENT
Start: 2023-01-01 | End: 2023-01-01

## 2023-01-01 RX ORDER — DIAZEPAM 5 MG/1
5 TABLET ORAL EVERY 8 HOURS PRN
Qty: 5 TABLET | Refills: 0 | Status: SHIPPED | OUTPATIENT
Start: 2023-01-01 | End: 2023-01-05

## 2023-01-01 RX ORDER — KETOROLAC TROMETHAMINE 30 MG/ML
15 INJECTION, SOLUTION INTRAMUSCULAR; INTRAVENOUS ONCE
Status: COMPLETED | OUTPATIENT
Start: 2023-01-01 | End: 2023-01-01

## 2023-01-01 RX ORDER — IBUPROFEN 800 MG/1
800 TABLET ORAL EVERY 8 HOURS PRN
Qty: 120 TABLET | Refills: 0 | Status: SHIPPED | OUTPATIENT
Start: 2023-01-01

## 2023-01-01 RX ORDER — LIDOCAINE 4 G/G
1 PATCH TOPICAL DAILY
Status: DISCONTINUED | OUTPATIENT
Start: 2023-01-01 | End: 2023-01-01 | Stop reason: HOSPADM

## 2023-01-01 RX ORDER — LIDOCAINE 50 MG/G
1 PATCH TOPICAL DAILY
Qty: 30 PATCH | Refills: 0 | Status: SHIPPED | OUTPATIENT
Start: 2023-01-01

## 2023-01-01 RX ADMIN — KETOROLAC TROMETHAMINE 15 MG: 30 INJECTION, SOLUTION INTRAMUSCULAR at 15:25

## 2023-01-01 RX ADMIN — LIDOCAINE HYDROCHLORIDE 5 ML: 10 INJECTION, SOLUTION EPIDURAL; INFILTRATION; INTRACAUDAL; PERINEURAL at 16:10

## 2023-01-01 RX ADMIN — DIAZEPAM 5 MG: 5 TABLET ORAL at 15:25

## 2023-01-01 ASSESSMENT — ENCOUNTER SYMPTOMS
GASTROINTESTINAL NEGATIVE: 1
EYES NEGATIVE: 1
ALLERGIC/IMMUNOLOGIC NEGATIVE: 1
RESPIRATORY NEGATIVE: 1

## 2023-01-01 ASSESSMENT — PAIN SCALES - GENERAL: PAINLEVEL_OUTOF10: 8

## 2023-01-01 NOTE — ED PROVIDER NOTES
[facility] RESIDENT NOTE       Date of evaluation: 1/1/2023    Chief Complaint     Neck Pain (Radiates down arm and shoulder blade. Started 5 days ago)      of Present Illness     Lisa Spencer is a 65 y.o. female history of hypertension, GERD, asthma who presents emergency department with neck pain for the past 5 days. The patient reports that she woke up on Tuesday and was having stiffness of her left lateral neck. She reports that she thought she had a crick in her neck so she has been taking medications including tramadol and gabapentin at home as well as using heating pads. She reports that she feels like the pain has worsened to the point that she can now no longer turn her head to the right. She rates her pain as a 10/10, describes it as a aching pain. She states that the pain is worse with attempting movement and better with rest.  She is also having numbness and tingling down the back of her arm and of her fourth and fifth digit on the left hand. She denies any recent injury, and has had no prior neck surgeries. Review of Systems     Review of Systems   Constitutional: Negative. HENT: Negative. Eyes: Negative. Respiratory: Negative. Cardiovascular: Negative. Gastrointestinal: Negative. Endocrine: Negative. Genitourinary: Negative. Musculoskeletal:  Positive for myalgias and neck pain. Skin: Negative. Allergic/Immunologic: Negative. Neurological: Negative. Hematological: Negative. Psychiatric/Behavioral: Negative. Past Medical, Surgical, Family, and Social History     She has a past medical history of Asthma, Carpal tunnel syndrome, Ganglion cyst, GERD (gastroesophageal reflux disease), Hypertension, PONV (postoperative nausea and vomiting), Prediabetes, and Thyroid cancer ([facility] [address]   She has a past surgical history that includes Carpal tunnel release; Bunionectomy (Bilateral); shoulder surgery (Right); Gallbladder surgery; Breast lumpectomy; Endoscopy, colon, diagnostic; Cholecystectomy; Tonsillectomy; Hysterectomy, vaginal; Colonoscopy (11/02/2021); Colonoscopy (11/02/2021); [place] tooth extraction; and Colonoscopy (4/12/2022). Her family history includes Cancer in her mother. She reports that she quit smoking about 33 years ago. Her smoking use included cigarettes. She has a 5.00 pack-year smoking history. She has never used smokeless tobacco. She reports current alcohol use of about 14.0 standard drinks per week. She reports that she does not use drugs.     Medications     Previous Medications    ALBUTEROL SULFATE HFA (PROVENTIL;VENTOLIN;PROAIR) 108 (90 BASE) MCG/ACT INHALER    Inhale 2 puffs into the lungs every 4 hours as needed    AMLODIPINE (NORVASC) 10 MG TABLET    Take 1 tablet by mouth daily    APPLE CIDER VINEGAR PO    Take by mouth    BRINDALL BERRY-CHROMIUM SHYANNE 500-0.2 MG TABS    Take by mouth    BUDESONIDE-FORMOTEROL (SYMBICORT) 160-4.5 MCG/ACT AERO    Inhale 2 puffs into the lungs 2 times daily    CALCIUM CARB-CHOLECALCIFEROL 500-600 MG-UNIT TABS    Take by mouth every evening    CRANBERRY 200 MG CAPS    Take by mouth    CYCLOBENZAPRINE (FLEXERIL) 10 MG TABLET    TAKE ONE TABLET BY MOUTH NIGHTLY    FLAXSEED, LINSEED, (FLAX SEED OIL PO)    Take by mouth daily    FLUOXETINE (PROZAC) 40 MG CAPSULE    TAKE 1 CAPSULE BY MOUTH ONE TIME A DAY    FLUTICASONE (FLONASE) 50 MCG/ACT NASAL SPRAY    1 spray by Each Nostril route daily    GINKGO 60 MG TABS    Take 2 tablets by mouth 2 times daily    GINSENG 100 MG CAPS    Take by mouth    GLUCOSAMINE 500 MG CAPS    Take 500 mg by mouth daily    LEVOTHYROXINE (SYNTHROID) 150 MCG TABLET    Take 1 tablet by mouth every other day    LEVOTHYROXINE (SYNTHROID) 175 MCG TABLET    Take 1 tablet by mouth every other day    LORATADINE (CLARITIN) 10 MG TABLET    Take 10 mg by mouth daily    LOSARTAN (COZAAR) 25 MG TABLET    Take 1 tablet by mouth daily    MELATONIN 3 MG TABS TABLET    Take 3 mg by mouth nightly    MISC NATURAL PRODUCTS (GREEN TEA) TABS    Take 1 tablet by mouth    MONTELUKAST (SINGULAIR) 10 MG TABLET    TAKE ONE TABLET BY MOUTH NIGHTLY    MULTIPLE VITAMINS-MINERALS (THERAPEUTIC MULTIVITAMIN-MINERALS) TABLET    Take 1 tablet by mouth daily    NUTRITIONAL SUPPLEMENTS (ESTROVEN NIGHTTIME PO)    Take by mouth    OMEPRAZOLE (PRILOSEC) 40 MG DELAYED RELEASE CAPSULE    TAKE ONE CAPSULE BY MOUTH EVERY MORNING BEFORE BREAKFAST    SENNA (SENOKOT) 8.6 MG TABS TABLET    Take 4 tablets by mouth as needed     SUPREP BOWEL PREP KIT 17.5-3.13-1.6 GM/177ML SOLN SOLUTION        TRAMADOL (ULTRAM) 50 MG TABLET    Take 50 mg by mouth every 6 hours as needed for Pain. VITAMIN D (VITAMIN D-1000 MAX ST) 25 MCG (1000 UT) TABS TABLET    Take 1,000 Units by mouth daily       Allergies     She is allergic to zofran [ondansetron], naproxen, penicillins, demerol hcl [meperidine], and sulfa antibiotics. Physical Exam     INITIAL VITALS: BP: (!) 172/99, Temp: 98.3 °F (36.8 °C), Heart Rate: 85, Resp: 14, SpO2: 100 %   Physical Exam  Constitutional:       General: She is not in acute distress. Appearance: Normal appearance. Comments: Uncomfortable appearing   HENT:      Head: Normocephalic and atraumatic. Mouth/Throat:      Mouth: Mucous membranes are moist.   Eyes:      Extraocular Movements: Extraocular movements intact. Pupils: Pupils are equal, round, and reactive to light. Neck:      Comments: Limited range of motion when attempting to turn the head to the right. Able to actively turn the head to the left and do chin to chest.  There is no midline cervical spine tenderness. No nuchal rigidity. Cardiovascular:      Rate and Rhythm: Normal rate and regular rhythm. Pulses: Normal pulses. Heart sounds: Normal heart sounds. No murmur heard. No friction rub. No gallop.    Pulmonary:      Effort: Pulmonary effort is normal.      Breath sounds: Normal breath sounds. Abdominal:      General: Abdomen is flat. Palpations: Abdomen is soft. Musculoskeletal:         General: Normal range of motion. Cervical back: Neck supple. Skin:     General: Skin is warm and dry. Capillary Refill: Capillary refill takes less than 2 seconds. Neurological:      General: No focal deficit present. Mental Status: She is alert and oriented to person, place, and time. Comments: 5/5  and motor strength in bilateral upper extremities   Psychiatric:         Mood and Affect: Mood normal.         Behavior: Behavior normal.         Thought Content: Thought content normal.         Judgment: Judgment normal.       DiagnosticResults     EKG   None    RADIOLOGY:  No orders to display       LABS:   No results found for this visit on 01/01/23. ED BEDSIDE ULTRASOUND:  No results found. RECENT VITALS:  BP: (!) 172/99, Temp: 98.3 °F (36.8 °C), Heart Rate: 85,Resp: 14, SpO2: 100 %     Procedures     None    ED Course     Nursing Notes, Past Medical Hx, Past Surgical Hx, Social Hx, Allergies, and Family Hx were reviewed. The patient was given the followingmedications:  Orders Placed This Encounter   Medications    diazePAM (VALIUM) tablet 5 mg    ketorolac (TORADOL) injection 15 mg    lidocaine 4 % external patch 1 patch    lidocaine PF 1 % injection 5 mL    ibuprofen (ADVIL;MOTRIN) 800 MG tablet     Sig: Take 1 tablet by mouth every 8 hours as needed for Pain (Take with food)     Dispense:  120 tablet     Refill:  0    diazePAM (VALIUM) 5 MG tablet     Sig: Take 1 tablet by mouth every 8 hours as needed for Anxiety or Sleep for up to 5 doses. Max Daily Amount: 15 mg     Dispense:  5 tablet     Refill:  0    lidocaine (LIDODERM) 5 %     Sig: Place 1 patch onto the skin daily 12 hours on, 12 hours off.      Dispense:  30 patch     Refill:  0       CONSULTS:  None    MEDICAL DECISION MAKING / ASSESSMENT / Kenya Ángela is a 65 y.o. female who presents emergency department with neck pain. Physical examination revealed significantly spasm muscle over the left SCM and left trapezius. The patient was not noted to have any weakness. She was complaining of numbness and tingling which was likely secondary to cervical radiculopathy. Given the patient's inability to turn her head laterally to the right, and a difficulty turning her head to the left, in addition to the spasms, I feel her presentation is most consistent with torticollis. The patient was treated with multimodal pain control including Valium, Toradol, and a Lidoderm patch and will be reassessed for provement in her exam.    On reassessment, the patient's pain had decreased to a 4/10. She had ability to turn her head to the right although it was somewhat limited. I performed a trigger point injection with lidocaine without epinephrine near her tight muscle in the area of the left trapezius. After this, the patient was observed and on reassessment had reported significant improvement in her pain. Ultimately, given the patient has no focal neurological deficits and has had improvement in her exam with pain control, I feel her presentation is most likely consistent with torticollis and will send her home with prescriptions for Valium as well as ibuprofen and Lidoderm patches. She has a follow-up appointment with her primary care provider on Thursday which I instructed her to keep as scheduled. She was given return precautions and ultimately she was deemed safe for discharge home. This patient was also evaluated by the attending physician. All care plans werediscussed and agreed upon. Clinical Impression     1.  Torticollis        Disposition     PATIENT REFERRED TO:  Lesli Vick MD  [address]  [phone]    On 1/5/2023        DISCHARGE MEDICATIONS:  New Prescriptions    DIAZEPAM (VALIUM) 5 MG TABLET    Take 1 tablet by mouth every 8 hours as needed for - - - Anxiety or Sleep for up to 5 doses. Max Daily Amount: 15 mg    IBUPROFEN (ADVIL;MOTRIN) 800 MG TABLET    Take 1 tablet by mouth every 8 hours as needed for Pain (Take with food)    LIDOCAINE (LIDODERM) 5 %    Place 1 patch onto the skin daily 12 hours on, 12 hours off.        DISPOSITION Decision To Discharge 01/01/2023 05:57:33 PM      Fransico Crum MD  Resident  01/01/23 2020

## 2023-01-01 NOTE — DISCHARGE INSTRUCTIONS
You were seen in the emergency department today and likely have a diagnosis called torticollis. This gets better with symptomatic treatment. You should still follow-up with your primary care provider as scheduled on Thursday. I have written you prescriptions for several medications that you can take to help with your pain. Please return to the ED for any new or worsening symptoms.   It was a pleasure caring for you today

## 2023-01-01 NOTE — ED TRIAGE NOTES
Patient started with neck pain last Tuesday, that radiates down left arm and into side of face, and down shoulder. Pt has taken Flexeril, Neurontin, Advil and Tylenol, and Ultram at home with minimal relief. Has an appointment later this week with PCP, but states that the pain is increasing, she stayed in bed all day yesterday, and is unable to turn her neck.

## 2023-01-01 NOTE — ED PROVIDER NOTES
ED Attending Attestation Note     Date of evaluation: 1/1/2023    This patient was seen by the resident. I have seen and examined the patient, agree with the workup, evaluation, management and diagnosis. The care plan has been discussed. My assessment reveals a woman who is awake and alert, appears uncomfortable but is nontoxic in appearance. Presentation is consistent with torticollis. She has palpable spasm in her left trapezius. I was present throughout trigger point injection performed by the resident.        Scooter Gill MD  01/01/23 3055

## 2023-01-05 ENCOUNTER — OFFICE VISIT (OUTPATIENT)
Dept: PRIMARY CARE CLINIC | Age: 63
End: 2023-01-05
Payer: COMMERCIAL

## 2023-01-05 VITALS
OXYGEN SATURATION: 98 % | HEART RATE: 79 BPM | WEIGHT: 205.6 LBS | DIASTOLIC BLOOD PRESSURE: 90 MMHG | HEIGHT: 64 IN | TEMPERATURE: 96.5 F | SYSTOLIC BLOOD PRESSURE: 134 MMHG | BODY MASS INDEX: 35.1 KG/M2 | RESPIRATION RATE: 16 BRPM

## 2023-01-05 DIAGNOSIS — M54.2 CERVICALGIA: Primary | ICD-10-CM

## 2023-01-05 PROCEDURE — 99214 OFFICE O/P EST MOD 30 MIN: CPT | Performed by: FAMILY MEDICINE

## 2023-01-05 PROCEDURE — 3074F SYST BP LT 130 MM HG: CPT | Performed by: FAMILY MEDICINE

## 2023-01-05 PROCEDURE — 3078F DIAST BP <80 MM HG: CPT | Performed by: FAMILY MEDICINE

## 2023-01-05 RX ORDER — PREDNISONE 20 MG/1
40 TABLET ORAL DAILY
Qty: 10 TABLET | Refills: 0 | Status: SHIPPED | OUTPATIENT
Start: 2023-01-05 | End: 2023-01-10

## 2023-01-05 SDOH — ECONOMIC STABILITY: FOOD INSECURITY: WITHIN THE PAST 12 MONTHS, YOU WORRIED THAT YOUR FOOD WOULD RUN OUT BEFORE YOU GOT MONEY TO BUY MORE.: NEVER TRUE

## 2023-01-05 SDOH — ECONOMIC STABILITY: FOOD INSECURITY: WITHIN THE PAST 12 MONTHS, THE FOOD YOU BOUGHT JUST DIDN'T LAST AND YOU DIDN'T HAVE MONEY TO GET MORE.: NEVER TRUE

## 2023-01-05 ASSESSMENT — PATIENT HEALTH QUESTIONNAIRE - PHQ9
8. MOVING OR SPEAKING SO SLOWLY THAT OTHER PEOPLE COULD HAVE NOTICED. OR THE OPPOSITE, BEING SO FIGETY OR RESTLESS THAT YOU HAVE BEEN MOVING AROUND A LOT MORE THAN USUAL: 1
SUM OF ALL RESPONSES TO PHQ9 QUESTIONS 1 & 2: 2
SUM OF ALL RESPONSES TO PHQ QUESTIONS 1-9: 13
4. FEELING TIRED OR HAVING LITTLE ENERGY: 3
2. FEELING DOWN, DEPRESSED OR HOPELESS: 0
1. LITTLE INTEREST OR PLEASURE IN DOING THINGS: 2
3. TROUBLE FALLING OR STAYING ASLEEP: 3
9. THOUGHTS THAT YOU WOULD BE BETTER OFF DEAD, OR OF HURTING YOURSELF: 0
SUM OF ALL RESPONSES TO PHQ QUESTIONS 1-9: 13
7. TROUBLE CONCENTRATING ON THINGS, SUCH AS READING THE NEWSPAPER OR WATCHING TELEVISION: 1
SUM OF ALL RESPONSES TO PHQ QUESTIONS 1-9: 13
6. FEELING BAD ABOUT YOURSELF - OR THAT YOU ARE A FAILURE OR HAVE LET YOURSELF OR YOUR FAMILY DOWN: 0
10. IF YOU CHECKED OFF ANY PROBLEMS, HOW DIFFICULT HAVE THESE PROBLEMS MADE IT FOR YOU TO DO YOUR WORK, TAKE CARE OF THINGS AT HOME, OR GET ALONG WITH OTHER PEOPLE: 1
5. POOR APPETITE OR OVEREATING: 3
SUM OF ALL RESPONSES TO PHQ QUESTIONS 1-9: 13

## 2023-01-05 ASSESSMENT — SOCIAL DETERMINANTS OF HEALTH (SDOH): HOW HARD IS IT FOR YOU TO PAY FOR THE VERY BASICS LIKE FOOD, HOUSING, MEDICAL CARE, AND HEATING?: NOT HARD AT ALL

## 2023-01-05 NOTE — PROGRESS NOTES
PROGRESS NOTE  Date of Service:  1/5/2023    SUBJECTIVE:  Patient ID: Stephanie Stratton is a 65 y.o. female    ASSESSMENT  1. Cervicalgia        PLAN:   1. Cervicalgia  -     [facility] - Physical Therapy  -     predniSONE (DELTASONE) 20 MG tablet; Take 2 tablets by mouth daily for 5 days, Disp-10 tablet, R-0Normal     With continued symptoms despite high-dose anti-inflammatories recommend neck step with steroid burst  Do continue supportive measures such as heat, massage, lidocaine patches  Check sleep position  Do recommend physical therapy and referral placed  Return in about 6 weeks (around 2/16/2023), or if symptoms worsen or fail to improve. HPI:     She has had some underlying episodes of back pain but not severe neck pain in the past    She was in her usual state of health until  Having pain Tuesday 12/27/2022 when she had sudden onset of pain that radiated down her left arm. She has tried home remedies but also trial of medications that she had on hand Flexeril was called in at that time as she has used in the past.  Pain continued and she did go to the emergency department on 1/1/2023  Records reviewed  She started a lidocaine patch was given higher dose ibuprofen and pain medicine at the emergency department. No specific injuiry or fall    She reports she did have some numbness and tingling into the left middle and fourth finger  She has had a history of trigger finger before and this seems to have worse  She had improvement overall of symptoms but things have not returned to baseline and still significantly impacting her daily activities and work      Patient's medications, allergies, past medical, surgical, social and family histories were reviewed and updated as appropriate.          OBJECTIVE:  Vitals:    01/05/23 1040   BP: (!) 134/90   Site: Right Upper Arm   Position: Sitting   Cuff Size: Large Adult   Pulse: 79   Resp: 16   Temp: (!) 96.5 °F (35.8 °C) TempSrc: Temporal   SpO2: 98%   Weight: 205 lb 9.6 oz (93.3 kg)   Height: 5' 4\" (1.626 m)      Body mass index is 35.29 kg/m². Physical Exam  Constitutional:       Appearance: Normal appearance. HENT:      Head: Normocephalic and atraumatic. Musculoskeletal:      Cervical back: Pain with movement and muscular tenderness present. No spinous process tenderness. Decreased range of motion. Neurological:      General: No focal deficit present. Mental Status: She is alert and oriented to person, place, and time. Cranial Nerves: No cranial nerve deficit. Motor: No weakness. Psychiatric:         Attention and Perception: Attention and perception normal.         Mood and Affect: Mood and affect normal.         Speech: Speech normal.         Behavior: Behavior normal. Behavior is cooperative. Thought Content: Thought content normal.         Cognition and Memory: Cognition and memory normal.         Judgment: Judgment normal.       Approximately 30 minutes of time were spent in preparation, direct patient contact, care coordination, documentation and activities otherwise related to this encounter. Electronically signed by Bailey Camacho MD on 1/5/2023 at 2:22 PM.    Please note this chart was generated using dragon dictation software. Although every effort was made to ensure the accuracy of this automated transcription, some errors in transcription may have occurred.

## 2023-01-16 DIAGNOSIS — E89.0 HYPOTHYROIDISM, POSTSURGICAL: ICD-10-CM

## 2023-01-16 RX ORDER — LEVOTHYROXINE SODIUM 0.15 MG/1
TABLET ORAL
Qty: 45 TABLET | Refills: 1 | OUTPATIENT
Start: 2023-01-16

## 2023-01-16 RX ORDER — LEVOTHYROXINE SODIUM 175 UG/1
TABLET ORAL
Qty: 45 TABLET | Refills: 1 | OUTPATIENT
Start: 2023-01-16

## 2023-01-18 ENCOUNTER — HOSPITAL ENCOUNTER (OUTPATIENT)
Dept: PHYSICAL THERAPY | Age: 63
Setting detail: THERAPIES SERIES
Discharge: HOME | End: 2023-01-18
Payer: COMMERCIAL

## 2023-01-18 PROCEDURE — 97110 THERAPEUTIC EXERCISES: CPT

## 2023-01-18 PROCEDURE — 97161 PT EVAL LOW COMPLEX 20 MIN: CPT

## 2023-01-18 NOTE — FLOWSHEET NOTE
[address] [facility], [facility] [address] [place] [address], [address] [place] [address]  Phone: [phone]   Fax:     [phone]                                                     Physical Therapy Daily Treatment Note  Date:  2023    Patient Name:  April Simon    :  1960  MRN: [id number]  Restrictions/Precautions:    Medical/Treatment Diagnosis Information:  Diagnosis: M54.2 (ICD-10-CM) - Cervicalgia  Treatment Diagnosis: M54.2 (ICD-10-CM) - Cervicalgia  Insurance/Certification information:  PT Insurance Information: [address]  Physician Information:   Kiarra Grey  Has the plan of care been signed (Y/N):        []  Yes  [x]  No     Date of Patient follow up with Physician:       Is this a Progress Report:     []  Yes  [x]  No        If Yes:  Date Range for reporting period:  Beginning   Ending     Progress report will be due (10 Rx or 30 days whichever is less):       Recertification will be due (POC Duration  / 90 days whichever is less):         Visit # Insurance Allowable Auth Required   1  []  Yes []  No        Functional Scale:  NDI = 36% disability   Date assessed:    Latex Allergy:  [x]NO      []YES  Preferred Language for Healthcare:   [x]English       []other:      Pain level:  3-8/10     SUBJECTIVE:  See eval    OBJECTIVE: See eval  Observation:   Test measurements:      RESTRICTIONS/PRECAUTIONS:   Exercises/Interventions:   Exercise/Equipment Resistance/Repetitions Other comments   Stretching/PROM     CROM X 10 each way    Chin tuck     UT side bend stretch     Levater scap stretch     Scalene stretch     Pectoral stretch     Shoulder Rolls 2 x 10    Scap Retraction 2 x 10    Isometrics     Retraction     shrugs     Cervical Flexion      Cervical Extension     Cervical sidebending               PRE's     External Rotation     Internal Rotation     Serratus     Biceps     Triceps     Shrugs     Horizontal Abd with ER     Reverse Flys     EXT     Flexion     Abduction          Cable Column/Theraband     Scapular Retraction     External Rotation     Internal Rotation     Ext     TRIC     Lats     Shrugs     Flex     BIC     PNF                Manual Intervention      Cerv mobs/manip      Thoracic mobs/manip      CT manip      Rib mobilizations        Therapeutic Exercise and NMR EXR  [x] (80563) Provided verbal/tactile cueing for activities related to strengthening, flexibility, endurance, ROM  for improvements in cervical, postural, scapular, scapulothoracic and UE control with self care, reaching, carrying, lifting, house/yardwork, driving/computer work.    [] (92183) Provided verbal/tactile cueing for activities related to improving balance, coordination, kinesthetic sense, posture, motor skill, proprioception  to assist with cervical, scapular, scapulothoracic and UE control with self care, reaching, carrying, lifting, house/yardwork, driving/computer work. Therapeutic Activities:    [] (95437 or 23899) Provided verbal/tactile cueing for activities related to improving balance, coordination, kinesthetic sense, posture, motor skill, proprioception and motor activation to allow for proper function of cervical, scapular, scapulothoracic and UE control with self care, carrying, lifting, driving/computer work.      Home Exercise Program:    [x] (75753) Reviewed/Progressed HEP activities related to strengthening, flexibility, endurance, ROM of cervical, scapular, scapulothoracic and UE control with self care, reaching, carrying, lifting, house/yardwork, driving/computer work  [] (30678) Reviewed/Progressed HEP activities related to improving balance, coordination, kinesthetic sense, posture, motor skill, proprioception of cervical, scapular, scapulothoracic and UE control with self care, reaching, carrying, lifting, house/yardwork, driving/computer work      Manual Treatments:  PROM / STM / Oscillations-Mobs:  G-I, II, III, IV (Gurwinder, Inf., Post.)  [] (04146) Provided manual therapy to mobilize soft tissue/joints of cervical/CT, scapular GHJ and UE for the purpose of decreasing headache, modulating pain, promoting relaxation,  increasing ROM, reducing/eliminating soft tissue swelling/inflammation/restriction, improving soft tissue extensibility and allowing for proper ROM for normal function with self care, reaching, carrying, lifting, house/yardwork, driving/computer work    Modalities:      Charges:  Timed Code Treatment Minutes: 25   Total Treatment Minutes: 35       [x] EVAL (LOW) 18422 (typically 20 minutes face-to-face)  [] EVAL (MOD) 11090 (typically 30 minutes face-to-face)  [] EVAL (HIGH) 67002 (typically 45 minutes face-to-face)  [] RE-EVAL     [x] JZ(28315) x  1   [] IONTO  [] NMR (20117) x     [] VASO  [] Manual (77802) x      [] Other:  [] TA x      [] Mech Traction (53891)  [] ES(attended) (88527)      [] ES (un) (22267):     GOALS:  Patient stated goal: To be pain free  [] Progressing: [] Met: [] Not Met: [] Adjusted    Therapist goals for Patient:   Short Term Goals: To be achieved in: 2 weeks  1. Independent in HEP and progression per patient tolerance, in order to prevent re-injury. [] Progressing: [] Met: [] Not Met: [] Adjusted   2. Patient will have a decrease in pain by one grade to facilitate improvement in movement, function, and ADLs as indicated by Functional Deficits. [] Progressing: [] Met: [] Not Met: [] Adjusted    Long Term Goals: To be achieved in: 6 weeks  1. Disability index score of 15% or less for the NDI to assist with reaching prior level of function. [] Progressing: [] Met: [] Not Met: [] Adjusted  2. Patient will demonstrate increased AROM to WNL of cervical/thoracic spine to allow for proper joint functioning as indicated by patients Functional Deficits.      [] Progressing: [] Met: [] Not Met: [] Adjusted  Patient will demonstrate an increase in postural awareness and control and activation of  Deep cervical stabilizers to allow for proper functional mobility as indicated by patients Functional Deficits. [] Progressing: [] Met: [] Not Met: [] Adjusted  4. Patient will return to all functional activities and ADL's without increased symptoms or restriction. [] Progressing: [] Met: [] Not Met: [] Adjusted    Overall Progression Towards Functional goals/ Treatment Progress Update:  [] Patient is progressing as expected towards functional goals listed. [] Progression is slowed due to complexities/Impairments listed. [] Progression has been slowed due to co-morbidities. [x] Plan just implemented, too soon to assess goals progression <30days   [] Goals require adjustment due to lack of progress  [] Patient is not progressing as expected and requires additional follow up with physician  [] Other    Prognosis for POC: [x] Good [] Fair  [] Poor      Patient requires continued skilled intervention: [x] Yes  [] No    Treatment/Activity Tolerance:  [x] Patient able to complete treatment  [] Patient limited by fatigue  [] Patient limited by pain     [] Patient limited by other medical complications  [] Other:                   Patient Education: Patient educated on diagnosis, prognosis,POC, HEP    Access Code: [email or web address]      PLAN: See eval  [] Continue per plan of care [] Alter current plan (see comments above)  [x] Plan of care initiated [] Hold pending MD visit [] Discharge      Electronically signed by:  Pollo Danielson PT, DPT    Note: If patient does not return for scheduled/ recommended follow up visits, this note will serve as a discharge from care along with most recent update on progress.

## 2023-01-18 NOTE — PLAN OF CARE
The [address]  Phone [phone]  Fax [phone]                                                          Physical Therapy Certification    Dear Celio Soler ,    We had the pleasure of evaluating the following patient for physical therapy services at [address] [place] [address]. A summary of our findings can be found in the initial assessment below. This includes our plan of care. If you have any questions or concerns regarding these findings, please do not hesitate to contact me at the office phone number checked above. Thank you for the referral.       Physician Signature:_______________________________Date:__________________  By signing above (or electronic signature), therapists plan is approved by physician      Patient: Nanette Poole   : 1960   MRN: [id number]  Referring Physician:  Celio Soler      Evaluation Date: 2023      Medical Diagnosis Information:  Diagnosis: M54.2 (ICD-10-CM) - Cervicalgia   Treatment Diagnosis: M54.2 (ICD-10-CM) - Cervicalgia                                         Insurance information: PT Insurance Information: [employer] [place]    Precautions/ Contra-indications:     Latex Allergy:  [x]NO      []YES  Preferred Language for Healthcare:   [x]English       []other:    SUBJECTIVE: Patient states that she experienced pain in her neck a for a very long time that she can't recall an exact date. Patient states that on   she states she went to the ER because of her neck  pain numbness and tingling on . Patient states she received a lidocaine injection which helped and was placed on prednisone. Patient states she is still taking ibuprofen for the pain. 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 Patient states she is looking for relief.      Relevant Medical History:    C-SSRS Triggered by Intake questionnaire (Past 2 wk assessment):   [x] No, Questionnaire did not trigger screening.   [] Yes, Patient intake triggered further evaluation      [] C-SSRS Screening completed  [] PCP notified via Plan of Care  [] Emergency services notified     Functional Disability Index:  NDI 18/50=36% disability    Pain Scale: 3-8/10  Easing factors: lidocaine injection, prednisone, ibuprofen, massage  Provocative factors: lifting, carrying, pushing, pulling    Type: []Constant   []Intermittent  []Radiating []Localized [x]other:sharp, achy     Numbness/Tingling: RUE/LUE on/off    Occupation/School: Nurse [employer]     Living Status/Prior Level of Function: Independent with ADLs and IADL's    OBJECTIVE:     ROM AROM Comments   Flexion 1 finger    Extension Decreased by 50%    Side bend R Decreased by 50% pain   Side bend L Decreased by 50% pain   Rotation R Decreased by 25% pain   Rotation L Decreased by 50% pain   Shoulder AROM clearing              Strength L R Comments   Neck flexion (C1-2) 3-/5 3-/5    Neck side bend (C3) 2+/5 2+/5    Shoulder elevation (C4) 3+/5 3+/5    Shoulder abduction (C5) 3+/5 3+/5    Elbow flexion and /or wrist extension  (C6) 5/5 5/5    Elbow extension and/or wrist flexion  (C7) 5/5 5/5    Thumb extension and/or ulnar deviation ((C8) 5/5 5/5    Abduction and /or adduction of hand intrinsics (T1)            DTR L R Comments   Biceps (C5,C6)      Brachioradialis (C5,C6)      Triceps (C7,C8)                Special Tests Results Comments   Foraminal compression test  +    Distraction test     Cervical Quadrant Testing +    Shoulder abduction(relief) test      Vertebral artery test      Valsalva test     Neurologic Signs     Joint play assessments      other         Joint mobility:    []Normal    [x]Hypo   []Hyper    Palpation: Increased TTP and hypersensitivity to UT musculature bilaterally 8/10 pain    Functional Mobility/Transfers: Decreased sleeping, driving, lifting, carrying, dressing    Posture: FHRS podture decreased thoracic extension    Bandages/Dressings/Incisions: n/a    Gait: (include devices/WB status): WNL     Orthopedic Special Tests:see above                       [x] Patient history, allergies, meds reviewed. Medical chart reviewed. See intake form. Review Of Systems (ROS):  [x]Performed Review of systems (Integumentary, CardioPulmonary, Neurological) by intake and observation. Intake form has been scanned into medical record. Patient has been instructed to contact their primary care physician regarding ROS issues if not already being addressed at this time.       Co-morbidities/Complexities (which will affect course of rehabilitation):   []None           Arthritic conditions   []Rheumatoid arthritis (M05.9)  []Osteoarthritis (M19.91)   Cardiovascular conditions   []Hypertension (I10)  []Hyperlipidemia (E78.5)  []Angina pectoris (I20)  []Atherosclerosis (I70)   Musculoskeletal conditions   []Disc pathology   []Congenital spine pathologies   [x]Prior surgical intervention  []Osteoporosis (M81.8)  []Osteopenia (M85.8)   Endocrine conditions   []Hypothyroid (E03.9)  []Hyperthyroid Gastrointestinal conditions   []Constipation (I91.66)   Metabolic conditions   []Morbid obesity (E66.01)  []Diabetes type 1(E10.65) or 2 (E11.65)   []Neuropathy (G60.9)     Pulmonary conditions   []Asthma (J45)  []Coughing   []COPD (J44.9)   Psychological Disorders  []Anxiety (F41.9)  []Depression (F32.9)   []Other:   []Other:          Barriers to/and or personal factors that will affect rehab potential:              []Age  []Sex              []Motivation/Lack of Motivation                        []Co-Morbidities              []Cognitive Function, education/learning barriers              []Environmental, home barriers              []profession/work barriers  [x]past PT/medical experience  []other:  Justification:     Falls Risk Assessment (30 days):   [x] Falls Risk assessed and no intervention required. [] Falls Risk assessed and Patient requires intervention due to being higher risk   TUG score (>12s at risk):     [] Falls education provided, including       G-Codes:       ASSESSMENT:   Functional Impairments:     [x]Noted cervical/thoracic/GHJ joint hypomobility   []Noted cervical/thoracic/GHJ joint hypermobility   [x]Decreased cervical/UE functional ROM   []Noted Headache pain aggravated by neck movements with/without dizziness   [x]Abnormal reflexes/sensation/myotomal/dermatomal deficits   []Decreased DCF control or ability to hold head up   [x]Decreased RC/scapular/core strength and neuromuscular control    [x]Decreased UE functional strength   []other:      Functional Activity Limitations (from functional questionnaire and intake)   [x]Reduced ability to tolerate prolonged functional positions   [x]Reduced ability or difficulty with changes of positions or transfers between positions   [x]Reduced ability to maintain good posture and demonstrate good body mechanics with sitting, bending, and lifting   [x] Reduced ability or tolerance with driving and/or computer work   [x]Reduced ability to perform lifting, reaching, carrying tasks   [x]Reduced ability to concentrate   [x]Reduced ability to sleep    [x]Reduced ability to tolerate any impact through UE or spine   [x]Reduced ability to ambulate prolonged functional periods/distances   []other:    Participation Restrictions   [x]Reduced participation in self care activities   [x]Reduced participation in home management activities   [x]Reduced participation in work activities   [x]Reduced participation in social activities. [x]Reduced participation in sport/recreational activities.     Classification/Subgrouping:   [x]signs/symptoms consistent with neck pain with mobility deficits     []signs/symptoms consistent with neck pain with movement coordinated impairments    [x]signs/symptoms consistent with neck pain with radiating pain []signs/symptoms consistent with neck pain with headaches (cervicogenic)    []Signs/symptoms consistent with nerve root involvement including myotome & dermatome dysfunction   []sign/symptoms consistent with facet dysfunction of cervical and thoracic spine    []signs/symptoms consistent suggesting central cord compression/UMN syndromes   []signs/symptoms consistent with discogenic cervical pain   []signs/symptoms consistent with rib dysfunction   []signs/symptoms consistent with postural dysfunction   []signs/symptoms consistent with shoulder pathology    []signs/symptoms consistent with post-surgical status including decreased ROM, strength and function.    []signs/symptoms consistent with pathology which may benefit from Dry Needling   []signs/symptoms which may limit the use of advanced manual therapy techniques: (Hypertension, recent trauma, intolerance to end range positions, prior TIA, visual issues, UE myotomes loss )     Prognosis/Rehab Potential:      []Excellent   [x]Good    []Fair   []Poor    Tolerance of evaluation/treatment:    []Excellent   [x]Good    []Fair   []Poor      Physical Therapy Evaluation Complexity Justification  [x] A history of present problem with:  [] no personal factors and/or comorbidities that impact the plan of care;  [x]1-2 personal factors and/or comorbidities that impact the plan of care  []3 personal factors and/or comorbidities that impact the plan of care  [x] An examination of body systems using standardized tests and measures addressing any of the following: body structures and functions (impairments), activity limitations, and/or participation restrictions;:  [x] a total of 1-2 or more elements   [] a total of 3 or more elements   [] a total of 4 or more elements   [x] A clinical presentation with:  [x] stable and/or uncomplicated characteristics   [] evolving clinical presentation with changing characteristics  [] unstable and unpredictable characteristics;   [x] Clinical decision making of [] low, [] moderate, [] high complexity using standardized patient assessment instrument and/or measurable assessment of functional outcome. [x] EVAL (LOW) 94132 (typically 20 minutes face-to-face)  [] EVAL (MOD) 71725 (typically 30 minutes face-to-face)  [] EVAL (HIGH) 40130 (typically 45 minutes face-to-face)  [] RE-EVAL     PLAN:   Frequency/Duration:  1-2 days per week for 6 Weeks:  Interventions:  [x]  Therapeutic exercise including: strength training, ROM, for cervical spine,scapula, core and Upper extremity, including postural re-education. [x]  NMR activation and proprioception for Deep cervical flexors, periscapular and RC muscles and Core, including postural re-education. [x]  Manual therapy as indicated for C/T spine, ribs, Soft tissue to include: Dry Needling/IASTM, STM, PROM, Gr I-IV mobilizations, manipulation. [x] Modalities as needed that may include: thermal agents, E-stim, Biofeedback, US, iontophoresis as indicated  [x] Patient education on joint protection, postural re-education, activity modification, progression of HEP. HEP instruction:   Access Code: [email or web address]  URL: [email or web address]  Date: 01/18/2023  Prepared by: Jimi Danielson    Exercises  Seated Shoulder Rolls - 1 x daily - 7 x weekly - 2 sets - 10 reps  Seated Cervical Flexion AROM - 1 x daily - 7 x weekly - 2 sets - 10 reps  Seated Cervical Rotation AROM - 1 x daily - 7 x weekly - 2 sets - 10 reps  Seated Scapular Retraction - 1 x daily - 7 x weekly - 2 sets - 10 reps  Seated Cervical Sidebending AROM - 1 x daily - 7 x weekly - 2 sets - 10 reps        GOALS:   Patient stated goal: To be pain free  [] Progressing: [] Met: [] Not Met: [] Adjusted    Therapist goals for Patient:   Short Term Goals: To be achieved in: 2 weeks  1. Independent in HEP and progression per patient tolerance, in order to prevent re-injury. [] Progressing: [] Met: [] Not Met: [] Adjusted   2.  Patient will have a decrease in pain by one grade to facilitate improvement in movement, function, and ADLs as indicated by Functional Deficits. [] Progressing: [] Met: [] Not Met: [] Adjusted    Long Term Goals: To be achieved in: 6 weeks  1. Disability index score of 15% or less for the NDI to assist with reaching prior level of function. [] Progressing: [] Met: [] Not Met: [] Adjusted  2. Patient will demonstrate increased AROM to WNL of cervical/thoracic spine to allow for proper joint functioning as indicated by patients Functional Deficits. [] Progressing: [] Met: [] Not Met: [] Adjusted  Patient will demonstrate an increase in postural awareness and control and activation of  Deep cervical stabilizers to allow for proper functional mobility as indicated by patients Functional Deficits. [] Progressing: [] Met: [] Not Met: [] Adjusted  4. Patient will return to all functional activities and ADL's without increased symptoms or restriction. [] Progressing: [] Met: [] Not Met: [] Adjusted      Electronically signed by:  Pollo Danielson, PT, DPT, CSCS    Note: If patient does not return for scheduled/ recommended follow up visits, this note will serve as a discharge from care along with most recent update on progress.

## 2023-01-23 ENCOUNTER — HOSPITAL ENCOUNTER (OUTPATIENT)
Dept: PHYSICAL THERAPY | Age: 63
Setting detail: THERAPIES SERIES
Discharge: HOME | End: 2023-01-23
Payer: COMMERCIAL

## 2023-01-23 PROCEDURE — 97140 MANUAL THERAPY 1/> REGIONS: CPT

## 2023-01-23 PROCEDURE — 97110 THERAPEUTIC EXERCISES: CPT

## 2023-01-23 NOTE — FLOWSHEET NOTE
[address] [facility], [address] [place] [address], [address] [place] [address]  Phone: [phone]   Fax:     [phone]                                                     Physical Therapy Daily Treatment Note  Date:  2023    Patient Name:  Rosario Cyr    :  1960  MRN: [id number]  Restrictions/Precautions:    Medical/Treatment Diagnosis Information:  Diagnosis: M54.2 (ICD-10-CM) - Cervicalgia  Treatment Diagnosis: M54.2 (ICD-10-CM) - Cervicalgia  Insurance/Certification information:  PT Insurance Information: [address]  Physician Information:   Kellee Call  Has the plan of care been signed (Y/N):        []  Yes  [x]  No     Date of Patient follow up with Physician:       Is this a Progress Report:     []  Yes  [x]  No        If Yes:  Date Range for reporting period:  Beginning   Ending     Progress report will be due (10 Rx or 30 days whichever is less): 0/15      Recertification will be due (POC Duration  / 90 days whichever is less):         Visit # Insurance Allowable Auth Required   2  []  Yes []  No        Functional Scale:  NDI = 36% disability   Date assessed:    Latex Allergy:  [x]NO      []YES  Preferred Language for Healthcare:   [x]English       []other:      Pain level:  1/23 3/10     SUBJECTIVE:  Patient states that she has been focusing on her exercises and states her motion is better     OBJECTIVE:  Marked TTP suboccipitals  Observation:   Test measurements:      RESTRICTIONS/PRECAUTIONS:   Exercises/Interventions:   Exercise/Equipment Resistance/Repetitions Other comments   Stretching/PROM     CROM X 10 each way    Chin tuck 2 x 10 x 5\"    UT side bend stretch     Levater scap stretch     Scalene stretch     Pectoral stretch     Shoulder Rolls 2 x 10    Scap Retraction 2 x 10    Isometrics     Retraction     shrugs     Cervical Flexion      Cervical Extension     Cervical sidebending     A walkout 2 x 10 Red 1/23   Bilateral ER Iso 3 x 30\" 1/23   PRE's     External Rotation     Internal Rotation     Serratus     Biceps     Triceps     Shrugs     Horizontal Abd with ER     Reverse Flys     EXT     Flexion     Abduction          Cable Column/Theraband     Scapular Retraction     External Rotation     Internal Rotation     Ext     TRIC     Lats     Shrugs     Flex     BIC     PNF                Manual Intervention      STM C/S, suboccipital release, LS, UT  C/S PROM all planes  UQ manual stretching 15 min     Thoracic mobs/manip      CT manip      Rib mobilizations        Therapeutic Exercise and NMR EXR  [x] (83775) Provided verbal/tactile cueing for activities related to strengthening, flexibility, endurance, ROM  for improvements in cervical, postural, scapular, scapulothoracic and UE control with self care, reaching, carrying, lifting, house/yardwork, driving/computer work.    [] (28583) Provided verbal/tactile cueing for activities related to improving balance, coordination, kinesthetic sense, posture, motor skill, proprioception  to assist with cervical, scapular, scapulothoracic and UE control with self care, reaching, carrying, lifting, house/yardwork, driving/computer work. Therapeutic Activities:    [] (75680 or 83665) Provided verbal/tactile cueing for activities related to improving balance, coordination, kinesthetic sense, posture, motor skill, proprioception and motor activation to allow for proper function of cervical, scapular, scapulothoracic and UE control with self care, carrying, lifting, driving/computer work.      Home Exercise Program:    [x] (82571) Reviewed/Progressed HEP activities related to strengthening, flexibility, endurance, ROM of cervical, scapular, scapulothoracic and UE control with self care, reaching, carrying, lifting, house/yardwork, driving/computer work  [] (18533) Reviewed/Progressed HEP activities related to improving balance, coordination, kinesthetic sense, posture, motor skill, proprioception of cervical, scapular, scapulothoracic and UE control with self care, reaching, carrying, lifting, house/yardwork, driving/computer work      Manual Treatments:  PROM / STM / Oscillations-Mobs:  G-I, II, III, IV (PA's, Inf., Post.)  [] (79139) Provided manual therapy to mobilize soft tissue/joints of cervical/CT, scapular GHJ and UE for the purpose of decreasing headache, modulating pain, promoting relaxation,  increasing ROM, reducing/eliminating soft tissue swelling/inflammation/restriction, improving soft tissue extensibility and allowing for proper ROM for normal function with self care, reaching, carrying, lifting, house/yardwork, driving/computer work    Modalities:      Charges:  Timed Code Treatment Minutes: 45   Total Treatment Minutes: 45       [] EVAL (LOW) 33395 (typically 20 minutes face-to-face)  [] EVAL (MOD) 66389 (typically 30 minutes face-to-face)  [] EVAL (HIGH) 46155 (typically 45 minutes face-to-face)  [] RE-EVAL     [x] OB(53602) x  2   [] IONTO  [] NMR (07836) x     [] VASO  [x] Manual (15071) x  1    [] Other:  [] TA x      [] Mech Traction (07784)  [] ES(attended) (98479)      [] ES (un) (78112):     GOALS:  Patient stated goal: To be pain free  [] Progressing: [] Met: [] Not Met: [] Adjusted    Therapist goals for Patient:   Short Term Goals: To be achieved in: 2 weeks  1. Independent in HEP and progression per patient tolerance, in order to prevent re-injury. [] Progressing: [] Met: [] Not Met: [] Adjusted   2. Patient will have a decrease in pain by one grade to facilitate improvement in movement, function, and ADLs as indicated by Functional Deficits. [] Progressing: [] Met: [] Not Met: [] Adjusted    Long Term Goals: To be achieved in: 6 weeks  1. Disability index score of 15% or less for the NDI to assist with reaching prior level of function. [] Progressing: [] Met: [] Not Met: [] Adjusted  2.  Patient will demonstrate increased AROM to WNL of cervical/thoracic spine to allow for proper joint functioning as indicated by patients Functional Deficits. [] Progressing: [] Met: [] Not Met: [] Adjusted  Patient will demonstrate an increase in postural awareness and control and activation of  Deep cervical stabilizers to allow for proper functional mobility as indicated by patients Functional Deficits. [] Progressing: [] Met: [] Not Met: [] Adjusted  4. Patient will return to all functional activities and ADL's without increased symptoms or restriction. [] Progressing: [] Met: [] Not Met: [] Adjusted    Overall Progression Towards Functional goals/ Treatment Progress Update:  [] Patient is progressing as expected towards functional goals listed. [] Progression is slowed due to complexities/Impairments listed. [] Progression has been slowed due to co-morbidities. [x] Plan just implemented, too soon to assess goals progression <30days   [] Goals require adjustment due to lack of progress  [] Patient is not progressing as expected and requires additional follow up with physician  [] Other    Prognosis for POC: [x] Good [] Fair  [] Poor      Patient requires continued skilled intervention: [x] Yes  [] No    Treatment/Activity Tolerance:  [x] Patient able to complete treatment  [] Patient limited by fatigue  [] Patient limited by pain     [] Patient limited by other medical complications  [x] Other: Improvement in cervical spine ROM noted. Cont increase in upper trapezius muscular tension which improved post manual therapy. Initiated DNF strengthening as well as periscapular muscle endurance training with good toleration and without c/o pain.                   Patient Education: Patient educated on diagnosis, prognosis,POC, HEP    Access Code: [email or web address]      PLAN: See eval  [x] Continue per plan of care [] Alter current plan (see comments above)  [] Plan of care initiated [] Hold pending MD visit [] Discharge      Electronically signed by:  Pollo Danielson PT, DPT    Note: If patient does not return for scheduled/ recommended follow up visits, this note will serve as a discharge from care along with most recent update on progress.

## 2023-02-01 ENCOUNTER — HOSPITAL ENCOUNTER (OUTPATIENT)
Dept: PHYSICAL THERAPY | Age: 63
Setting detail: THERAPIES SERIES
Discharge: HOME | End: 2023-02-01
Payer: COMMERCIAL

## 2023-02-01 PROCEDURE — 97140 MANUAL THERAPY 1/> REGIONS: CPT

## 2023-02-01 PROCEDURE — 97110 THERAPEUTIC EXERCISES: CPT

## 2023-02-01 NOTE — FLOWSHEET NOTE
[facility], [facility] [employer] [address] [place] [address], [address] [place] [address]  Phone: [phone]   Fax:     [phone]                                                     Physical Therapy Daily Treatment Note  Date:  2023    Patient Name:  Ramiro Thorne    :  1960  MRN: [id number]  Restrictions/Precautions:    Medical/Treatment Diagnosis Information:  Diagnosis: M54.2 (ICD-10-CM) - Cervicalgia  Treatment Diagnosis: M54.2 (ICD-10-CM) - Cervicalgia  Insurance/Certification information:  PT Insurance Information: [address]  Physician Information:   Kayla Pompa  Has the plan of care been signed (Y/N):        []  Yes  [x]  No     Date of Patient follow up with Physician:       Is this a Progress Report:     []  Yes  [x]  No        If Yes:  Date Range for reporting period:  Beginning   Ending     Progress report will be due (10 Rx or 30 days whichever is less):       Recertification will be due (POC Duration  / 90 days whichever is less):         Visit # Insurance Allowable Auth Required   3  []  Yes []  No        Functional Scale:  NDI = 36% disability   Date assessed:    Latex Allergy:  [x]NO      []YES  Preferred Language for Healthcare:   [x]English       []other:      Pain level:  2/1    2/10     SUBJECTIVE:  21/ Patient states that her motion is getting better although when she has to do a lot of pushing and carrying at work she feels it    OBJECTIVE:   UT muscle length decreased by 25%  Observation:   Test measurements:      RESTRICTIONS/PRECAUTIONS:   Exercises/Interventions:   Exercise/Equipment Resistance/Repetitions Other comments   Stretching/PROM     CROM X 10 each way    Chin tuck 2 x 10 x 5\"    Chin tuck pull apart 2 x 10    Levater scap stretch     Scalene stretch     Pectoral stretch     Shoulder Rolls 2 x 10    Scap Retraction 2 x 10    Isometrics     Retraction     shrugs     Cervical Flexion Cervical Extension     Cervical sidebending     A walkout 2 x 10 Red 1/23   Bilateral ER Iso 3 x 30\" 1/23   PRE's     External Rotation     Internal Rotation     Serratus     Biceps     Triceps     Shrugs     Horizontal Abd with ER     Reverse Flys     EXT     Flexion     Abduction          Cable Column/Theraband     Scapular Retraction     External Rotation     Internal Rotation     Ext     TRIC     Lats     Shrugs     Flex     BIC     PNF                Manual Intervention      STM C/S, suboccipital release, LS, UT  C/S PROM all planes  UQ manual stretching 15 min     Thoracic mobs/manip      CT manip      Rib mobilizations        Therapeutic Exercise and NMR EXR  [x] (56231) Provided verbal/tactile cueing for activities related to strengthening, flexibility, endurance, ROM  for improvements in cervical, postural, scapular, scapulothoracic and UE control with self care, reaching, carrying, lifting, house/yardwork, driving/computer work.    [] (71683) Provided verbal/tactile cueing for activities related to improving balance, coordination, kinesthetic sense, posture, motor skill, proprioception  to assist with cervical, scapular, scapulothoracic and UE control with self care, reaching, carrying, lifting, house/yardwork, driving/computer work. Therapeutic Activities:    [] (08675 or 84126) Provided verbal/tactile cueing for activities related to improving balance, coordination, kinesthetic sense, posture, motor skill, proprioception and motor activation to allow for proper function of cervical, scapular, scapulothoracic and UE control with self care, carrying, lifting, driving/computer work.      Home Exercise Program:    [x] (07932) Reviewed/Progressed HEP activities related to strengthening, flexibility, endurance, ROM of cervical, scapular, scapulothoracic and UE control with self care, reaching, carrying, lifting, house/yardwork, driving/computer work  [] (45897) Reviewed/Progressed HEP activities related to improving balance, coordination, kinesthetic sense, posture, motor skill, proprioception of cervical, scapular, scapulothoracic and UE control with self care, reaching, carrying, lifting, house/yardwork, driving/computer work      Manual Treatments:  PROM / STM / Oscillations-Mobs:  G-I, II, III, IV (PA's, Inf., Post.)  [] (96717) Provided manual therapy to mobilize soft tissue/joints of cervical/CT, scapular GHJ and UE for the purpose of decreasing headache, modulating pain, promoting relaxation,  increasing ROM, reducing/eliminating soft tissue swelling/inflammation/restriction, improving soft tissue extensibility and allowing for proper ROM for normal function with self care, reaching, carrying, lifting, house/yardwork, driving/computer work    Modalities:      Charges:  Timed Code Treatment Minutes: 45   Total Treatment Minutes: 45       [] EVAL (LOW) 48324 (typically 20 minutes face-to-face)  [] EVAL (MOD) 70392 (typically 30 minutes face-to-face)  [] EVAL (HIGH) 06434 (typically 45 minutes face-to-face)  [] RE-EVAL     [x] SO(44364) x  2   [] IONTO  [] NMR (44106) x     [] VASO  [x] Manual (49599) x  1    [] Other:  [] TA x      [] Mech Traction (31329)  [] ES(attended) (99367)      [] ES (un) (56403):     GOALS:  Patient stated goal: To be pain free  [] Progressing: [] Met: [] Not Met: [] Adjusted    Therapist goals for Patient:   Short Term Goals: To be achieved in: 2 weeks  1. Independent in HEP and progression per patient tolerance, in order to prevent re-injury. [] Progressing: [] Met: [] Not Met: [] Adjusted   2. Patient will have a decrease in pain by one grade to facilitate improvement in movement, function, and ADLs as indicated by Functional Deficits. [] Progressing: [] Met: [] Not Met: [] Adjusted    Long Term Goals: To be achieved in: 6 weeks  1. Disability index score of 15% or less for the NDI to assist with reaching prior level of function.    [] Progressing: [] Met: [] Not Met: [] Adjusted  2. Patient will demonstrate increased AROM to WNL of cervical/thoracic spine to allow for proper joint functioning as indicated by patients Functional Deficits. [] Progressing: [] Met: [] Not Met: [] Adjusted  Patient will demonstrate an increase in postural awareness and control and activation of  Deep cervical stabilizers to allow for proper functional mobility as indicated by patients Functional Deficits. [] Progressing: [] Met: [] Not Met: [] Adjusted  4. Patient will return to all functional activities and ADL's without increased symptoms or restriction. [] Progressing: [] Met: [] Not Met: [] Adjusted    Overall Progression Towards Functional goals/ Treatment Progress Update:  [] Patient is progressing as expected towards functional goals listed. [] Progression is slowed due to complexities/Impairments listed. [] Progression has been slowed due to co-morbidities. [x] Plan just implemented, too soon to assess goals progression <30days   [] Goals require adjustment due to lack of progress  [] Patient is not progressing as expected and requires additional follow up with physician  [] Other    Prognosis for POC: [x] Good [] Fair  [] Poor      Patient requires continued skilled intervention: [x] Yes  [] No    Treatment/Activity Tolerance:  [x] Patient able to complete treatment  [] Patient limited by fatigue  [] Patient limited by pain     [] Patient limited by other medical complications  [x] Other: Improved cervical spine mobility in all planes noted improving participation in self-care and ADL's with less pain. Increased DNF strengthening with good tolerance and to be added to HEP.                    Patient Education: Patient educated on diagnosis, prognosis,POC, HEP    Access Code: [email or web address]      PLAN: See eval  [x] Continue per plan of care [] Alter current plan (see comments above)  [] Plan of care initiated [] Hold pending MD visit [] Discharge      Electronically signed by:  Pollo August, PT, DPT    Note: If patient does not return for scheduled/ recommended follow up visits, this note will serve as a discharge from care along with most recent update on progress.

## 2023-02-09 ENCOUNTER — HOSPITAL ENCOUNTER (OUTPATIENT)
Dept: PHYSICAL THERAPY | Age: 63
Setting detail: THERAPIES SERIES
Discharge: HOME | End: 2023-02-09
Payer: COMMERCIAL

## 2023-02-09 PROCEDURE — 97140 MANUAL THERAPY 1/> REGIONS: CPT

## 2023-02-09 PROCEDURE — 97110 THERAPEUTIC EXERCISES: CPT

## 2023-02-09 NOTE — FLOWSHEET NOTE
[facility], [facility] [employer] [address], [address] [place] [address]  Phone: [phone]   Fax:     [phone]                                                     Physical Therapy Daily Treatment Note  Date:  2023    Patient Name:  Jose Angel Anne    :  1960  MRN: [id number]  Restrictions/Precautions:    Medical/Treatment Diagnosis Information:  Diagnosis: M54.2 (ICD-10-CM) - Cervicalgia  Treatment Diagnosis: M54.2 (ICD-10-CM) - Cervicalgia  Insurance/Certification information:  PT Insurance Information: [address]  Physician Information:   Mariah Tsang  Has the plan of care been signed (Y/N):        []  Yes  [x]  No     Date of Patient follow up with Physician:       Is this a Progress Report:     []  Yes  [x]  No        If Yes:  Date Range for reporting period:  Beginning   Ending     Progress report will be due (10 Rx or 30 days whichever is less):       Recertification will be due (POC Duration  / 90 days whichever is less):         Visit # Insurance Allowable Auth Required   4  []  Yes []  No        Functional Scale:  NDI = 36% disability   Date assessed:    Latex Allergy:  [x]NO      []YES  Preferred Language for Healthcare:   [x]English       []other:      Pain level:  2/9    2/10     SUBJECTIVE:   Patient states that she is feeling better with more movement in her neck    OBJECTIVE:   Rhomboid strength 3-/5  Observation:   Test measurements:      RESTRICTIONS/PRECAUTIONS:   Exercises/Interventions:   Exercise/Equipment Resistance/Repetitions Other comments   Stretching/PROM     CROM X 10 each way    Chin tuck 2 x 10 x 5\"    Chin tuck pull apart 2 x 10    Levater scap stretch     Scalene stretch     Pectoral stretch     Shoulder Rolls 2 x 10    Scap Retraction 2 x 10    Isometrics     Retraction     shrugs     Cervical Flexion      Cervical Extension     Cervical sidebending     A walkout 2 x 10 Red 1/23   Bilateral ER Iso 3 x 30\" 1/23   W's  Red 3 x 10 2/9   PRE's     External Rotation     Internal Rotation     Serratus     Biceps     Triceps     Shrugs     Horizontal Abd with ER     Reverse Flys     EXT     Flexion     Abduction          Cable Column/Theraband     Scapular Retraction 3 x 10 Blue 2/9   External Rotation     Internal Rotation     Ext     TRIC     Lats     Shrugs     Flex     BIC     PNF                Manual Intervention      STM C/S, suboccipital release, LS, UT  C/S PROM all planes  UQ manual stretching 15 min     Thoracic mobs/manip      CT manip      Rib mobilizations        Therapeutic Exercise and NMR EXR  [x] (85993) Provided verbal/tactile cueing for activities related to strengthening, flexibility, endurance, ROM  for improvements in cervical, postural, scapular, scapulothoracic and UE control with self care, reaching, carrying, lifting, house/yardwork, driving/computer work.    [] (16971) Provided verbal/tactile cueing for activities related to improving balance, coordination, kinesthetic sense, posture, motor skill, proprioception  to assist with cervical, scapular, scapulothoracic and UE control with self care, reaching, carrying, lifting, house/yardwork, driving/computer work. Therapeutic Activities:    [] (69059 or 43492) Provided verbal/tactile cueing for activities related to improving balance, coordination, kinesthetic sense, posture, motor skill, proprioception and motor activation to allow for proper function of cervical, scapular, scapulothoracic and UE control with self care, carrying, lifting, driving/computer work.      Home Exercise Program:    [x] (95674) Reviewed/Progressed HEP activities related to strengthening, flexibility, endurance, ROM of cervical, scapular, scapulothoracic and UE control with self care, reaching, carrying, lifting, house/yardwork, driving/computer work  [] (06443) Reviewed/Progressed HEP activities related to improving balance, coordination, kinesthetic sense, posture, motor skill, proprioception of cervical, scapular, scapulothoracic and UE control with self care, reaching, carrying, lifting, house/yardwork, driving/computer work      Manual Treatments:  PROM / STM / Oscillations-Mobs:  G-I, II, III, IV (PA's, Inf., Post.)  [] (05897) Provided manual therapy to mobilize soft tissue/joints of cervical/CT, scapular GHJ and UE for the purpose of decreasing headache, modulating pain, promoting relaxation,  increasing ROM, reducing/eliminating soft tissue swelling/inflammation/restriction, improving soft tissue extensibility and allowing for proper ROM for normal function with self care, reaching, carrying, lifting, house/yardwork, driving/computer work    Modalities:      Charges:  Timed Code Treatment Minutes: 45   Total Treatment Minutes: 45       [] EVAL (LOW) 61789 (typically 20 minutes face-to-face)  [] EVAL (MOD) 96034 (typically 30 minutes face-to-face)  [] EVAL (HIGH) 22488 (typically 45 minutes face-to-face)  [] RE-EVAL     [x] OQ(93180) x  2   [] IONTO  [] NMR (33884) x     [] VASO  [x] Manual (25823) x  1    [] Other:  [] TA x      [] Mech Traction (72512)  [] ES(attended) (72509)      [] ES (un) (55388):     GOALS:  Patient stated goal: To be pain free  [] Progressing: [] Met: [] Not Met: [] Adjusted    Therapist goals for Patient:   Short Term Goals: To be achieved in: 2 weeks  1. Independent in HEP and progression per patient tolerance, in order to prevent re-injury. [] Progressing: [] Met: [] Not Met: [] Adjusted   2. Patient will have a decrease in pain by one grade to facilitate improvement in movement, function, and ADLs as indicated by Functional Deficits. [] Progressing: [] Met: [] Not Met: [] Adjusted    Long Term Goals: To be achieved in: 6 weeks  1. Disability index score of 15% or less for the NDI to assist with reaching prior level of function. [] Progressing: [] Met: [] Not Met: [] Adjusted  2.  Patient will demonstrate increased AROM to WNL of cervical/thoracic spine to allow for proper joint functioning as indicated by patients Functional Deficits. [] Progressing: [] Met: [] Not Met: [] Adjusted  Patient will demonstrate an increase in postural awareness and control and activation of  Deep cervical stabilizers to allow for proper functional mobility as indicated by patients Functional Deficits. [] Progressing: [] Met: [] Not Met: [] Adjusted  4. Patient will return to all functional activities and ADL's without increased symptoms or restriction. [] Progressing: [] Met: [] Not Met: [] Adjusted    Overall Progression Towards Functional goals/ Treatment Progress Update:  [] Patient is progressing as expected towards functional goals listed. [] Progression is slowed due to complexities/Impairments listed. [] Progression has been slowed due to co-morbidities. [x] Plan just implemented, too soon to assess goals progression <30days   [] Goals require adjustment due to lack of progress  [] Patient is not progressing as expected and requires additional follow up with physician  [] Other    Prognosis for POC: [x] Good [] Fair  [] Poor      Patient requires continued skilled intervention: [x] Yes  [] No    Treatment/Activity Tolerance:  [x] Patient able to complete treatment  [] Patient limited by fatigue  [] Patient limited by pain     [] Patient limited by other medical complications  [x] Other: Cont improvement toward's STG's and LTG's listed in the POC. Patient with improving statis and dynamic posture. Added W's with good tolerance although mod verbal cueing required for proper form.                   Patient Education: Patient educated on diagnosis, prognosis,POC, HEP    Access Code: [email or web address]      PLAN: See eval  [x] Continue per plan of care [] Alter current plan (see comments above)  [] Plan of care initiated [] Hold pending MD visit [] Discharge      Electronically signed by:  Pollo Danielson PT, DPT    Note: If patient does not return for scheduled/ recommended follow up visits, this note will serve as a discharge from care along with most recent update on progress.

## 2023-02-14 ENCOUNTER — HOSPITAL ENCOUNTER (OUTPATIENT)
Dept: PHYSICAL THERAPY | Age: 63
Setting detail: THERAPIES SERIES
Discharge: HOME | End: 2023-02-14
Payer: COMMERCIAL

## 2023-02-14 PROCEDURE — 97140 MANUAL THERAPY 1/> REGIONS: CPT

## 2023-02-14 PROCEDURE — 97110 THERAPEUTIC EXERCISES: CPT

## 2023-02-14 NOTE — FLOWSHEET NOTE
[address] [facility], [facility] [employer] [address] [place] [address], [address] [place] [address]  Phone: [phone]   Fax:     [phone]                                                     Physical Therapy Daily Treatment Note  Date:  2023    Patient Name:  Isabelle Marvin    :  1960  MRN: [id number]  Restrictions/Precautions:    Medical/Treatment Diagnosis Information:  Diagnosis: M54.2 (ICD-10-CM) - Cervicalgia  Treatment Diagnosis: M54.2 (ICD-10-CM) - Cervicalgia  Insurance/Certification information:  PT Insurance Information: [address]  Physician Information:   Keesha Titi  Has the plan of care been signed (Y/N):        []  Yes  [x]  No     Date of Patient follow up with Physician:       Is this a Progress Report:     []  Yes  [x]  No        If Yes:  Date Range for reporting period:  Beginning   Ending     Progress report will be due (10 Rx or 30 days whichever is less):       Recertification will be due (POC Duration  / 90 days whichever is less):         Visit # Insurance Allowable Auth Required   5  []  Yes []  No        Functional Scale:  NDI = 36% disability   Date assessed:    Latex Allergy:  [x]NO      []YES  Preferred Language for Healthcare:   [x]English       []other:      Pain level:      5/10     SUBJECTIVE:    Patient states that she is feeling some more stiffness today and her neck was achy yesterday but was better today    OBJECTIVE:    T/S Extension decreased by 50%    Observation:   Test measurements:      RESTRICTIONS/PRECAUTIONS:   Exercises/Interventions:   Exercise/Equipment Resistance/Repetitions Other comments   Stretching/PROM     CROM X 10 each way    Chin tuck 2 x 10 x 5\"    Chin tuck pull apart 3 x 10 ^    Levater scap stretch     Scalene stretch     T/S Extension seated 2 x 10    Shoulder Rolls 2 x 10    Scap Retraction 2 x 10    Isometrics     Retraction     shrugs     Cervical Flexion      Cervical Extension     Cervical sidebending     A walkout 2 x 10 Red 1/23   Bilateral ER Iso 3 x 30\" 1/23   W's  Red 3 x 10 2/9   PRE's     External Rotation     Internal Rotation     Horizontal Abduction     Biceps     Triceps     Shrugs     Horizontal Abd with ER     Reverse Flys     EXT     Flexion     Abduction          Cable Column/Theraband     Scapular Retraction 3 x 10 Blue 2/9   External Rotation     Internal Rotation     Ext     TRIC     Lats     Shrugs     Flex     BIC     PNF                Manual Intervention      STM C/S, suboccipital release, LS, UT  C/S PROM all planes  UQ manual stretching 15 min     Thoracic mobs/manip      CT manip      Rib mobilizations        Therapeutic Exercise and NMR EXR  [x] (13094) Provided verbal/tactile cueing for activities related to strengthening, flexibility, endurance, ROM  for improvements in cervical, postural, scapular, scapulothoracic and UE control with self care, reaching, carrying, lifting, house/yardwork, driving/computer work.    [] (61666) Provided verbal/tactile cueing for activities related to improving balance, coordination, kinesthetic sense, posture, motor skill, proprioception  to assist with cervical, scapular, scapulothoracic and UE control with self care, reaching, carrying, lifting, house/yardwork, driving/computer work. Therapeutic Activities:    [] (53204 or 50408) Provided verbal/tactile cueing for activities related to improving balance, coordination, kinesthetic sense, posture, motor skill, proprioception and motor activation to allow for proper function of cervical, scapular, scapulothoracic and UE control with self care, carrying, lifting, driving/computer work.      Home Exercise Program:    [x] (17233) Reviewed/Progressed HEP activities related to strengthening, flexibility, endurance, ROM of cervical, scapular, scapulothoracic and UE control with self care, reaching, carrying, lifting, house/yardwork, driving/computer work  [] (06355) Reviewed/Progressed HEP activities related to improving balance, coordination, kinesthetic sense, posture, motor skill, proprioception of cervical, scapular, scapulothoracic and UE control with self care, reaching, carrying, lifting, house/yardwork, driving/computer work      Manual Treatments:  PROM / STM / Oscillations-Mobs:  G-I, II, III, IV (PA's, Inf., Post.)  [] ([id number]) Provided manual therapy to mobilize soft tissue/joints of cervical/CT, scapular GHJ and UE for the purpose of decreasing headache, modulating pain, promoting relaxation,  increasing ROM, reducing/eliminating soft tissue swelling/inflammation/restriction, improving soft tissue extensibility and allowing for proper ROM for normal function with self care, reaching, carrying, lifting, house/yardwork, driving/computer work    Modalities:      Charges:  Timed Code Treatment Minutes: 45   Total Treatment Minutes: 45       [] EVAL (LOW) 97200 (typically 20 minutes face-to-face)  [] EVAL (MOD) 97033 (typically 30 minutes face-to-face)  [] EVAL (HIGH) 86375 (typically 45 minutes face-to-face)  [] RE-EVAL     [x] TU(85654) x  2   [] IONTO  [] NMR (06996) x     [] VASO  [x] Manual ([id number]) x  1    [] Other:  [] TA x      [] Mech Traction (29455)  [] ES(attended) (97131)      [] ES (un) (41341):     GOALS:  Patient stated goal: To be pain free  [] Progressing: [] Met: [] Not Met: [] Adjusted    Therapist goals for Patient:   Short Term Goals: To be achieved in: 2 weeks  1. Independent in HEP and progression per patient tolerance, in order to prevent re-injury. [] Progressing: [] Met: [] Not Met: [] Adjusted   2. Patient will have a decrease in pain by one grade to facilitate improvement in movement, function, and ADLs as indicated by Functional Deficits. [] Progressing: [] Met: [] Not Met: [] Adjusted    Long Term Goals: To be achieved in: 6 weeks  1.  Disability index score of 15% or less for the NDI to assist with reaching prior level of function. [] Progressing: [] Met: [] Not Met: [] Adjusted  2. Patient will demonstrate increased AROM to WNL of cervical/thoracic spine to allow for proper joint functioning as indicated by patients Functional Deficits. [] Progressing: [] Met: [] Not Met: [] Adjusted  Patient will demonstrate an increase in postural awareness and control and activation of  Deep cervical stabilizers to allow for proper functional mobility as indicated by patients Functional Deficits. [] Progressing: [] Met: [] Not Met: [] Adjusted  4. Patient will return to all functional activities and ADL's without increased symptoms or restriction. [] Progressing: [] Met: [] Not Met: [] Adjusted    Overall Progression Towards Functional goals/ Treatment Progress Update:  [] Patient is progressing as expected towards functional goals listed. [] Progression is slowed due to complexities/Impairments listed. [] Progression has been slowed due to co-morbidities. [x] Plan just implemented, too soon to assess goals progression <30days   [] Goals require adjustment due to lack of progress  [] Patient is not progressing as expected and requires additional follow up with physician  [] Other    Prognosis for POC: [x] Good [] Fair  [] Poor      Patient requires continued skilled intervention: [x] Yes  [] No    Treatment/Activity Tolerance:  [x] Patient able to complete treatment  [] Patient limited by fatigue  [] Patient limited by pain     [] Patient limited by other medical complications  [x] Other: Increase in stiffness and tightness noted today but improved post manual therapy.  Initiated thoracic mobility as well as periscapular strengthening with good tolerance                  Patient Education: Patient educated on diagnosis, prognosis,POC, HEP    Access Code: [email or web address]      PLAN: See eval  [x] Continue per plan of care [] Alter current plan (see comments above)  [] Plan of care initiated [] Hold pending MD visit [] Discharge      Electronically signed by:  Wayne Danielson PT, DPT    Note: If patient does not return for scheduled/ recommended follow up visits, this note will serve as a discharge from care along with most recent update on progress.

## 2023-02-22 ENCOUNTER — HOSPITAL ENCOUNTER (OUTPATIENT)
Dept: PHYSICAL THERAPY | Age: 63
Setting detail: THERAPIES SERIES
Discharge: HOME | End: 2023-02-22
Payer: COMMERCIAL

## 2023-02-22 PROCEDURE — 97110 THERAPEUTIC EXERCISES: CPT

## 2023-02-22 PROCEDURE — 97140 MANUAL THERAPY 1/> REGIONS: CPT

## 2023-02-22 NOTE — FLOWSHEET NOTE
[address] [facility], [facility] [employer] [address], [address] [place] [address]  Phone: [phone]   Fax:     [phone]                                                     Physical Therapy Daily Treatment Note  Date:  2023    Patient Name:  Aaliyah Gray    :  1960  MRN: [id number]  Restrictions/Precautions:    Medical/Treatment Diagnosis Information:  Diagnosis: M54.2 (ICD-10-CM) - Cervicalgia  Treatment Diagnosis: M54.2 (ICD-10-CM) - Cervicalgia  Insurance/Certification information:  PT Insurance Information: [address]  Physician Information:   Keiry Ross  Has the plan of care been signed (Y/N):        []  Yes  [x]  No     Date of Patient follow up with Physician:       Is this a Progress Report:     [x]  Yes  []  No        If Yes:  Date Range for reporting period:  Beginning   Ending     Progress report will be due (10 Rx or 30 days whichever is less):       Recertification will be due (POC Duration  / 90 days whichever is less):         Visit # Insurance Allowable Auth Required   6  []  Yes []  No        Functional Scale:  NDI = 18% disability   Date assessed:     Latex Allergy:  [x]NO      []YES  Preferred Language for Healthcare:   [x]English       []other:      Pain level:  2/22    2/10     SUBJECTIVE:    Patient states that she is feeling less stiffness in her neck    OBJECTIVE:    Increased TTP noted to R sided parapsinals  Observation:   Test measurements:      RESTRICTIONS/PRECAUTIONS:   Exercises/Interventions:   Exercise/Equipment Resistance/Repetitions Other comments   Stretching/PROM     CROM X 10 each way    Chin tuck 2 x 10 x 5\"    Chin tuck pull apart 3 x 10 ^    Levater scap stretch     Scalene stretch     T/S Extension seated 2 x 10    Shoulder Rolls 2 x 10    Scap Retraction 2 x 10    Isometrics     Retraction     shrugs     Cervical Flexion      Cervical Extension     Cervical sidebending     A walkout 2 x 10 Red 1/23   Bilateral ER Iso 3 x 30\" 1/23   W's  Red 3 x 10 2/9   Supine X's 2 x 10 2/22   PRE's     External Rotation     Internal Rotation     Horizontal Abduction     Biceps     Triceps     Shrugs     Horizontal Abd with ER     Reverse Flys     EXT     Flexion     Abduction          Cable Column/Theraband     Scapular Retraction 3 x 10 Blue 2/9   External Rotation     Internal Rotation     Ext     TRIC     Lats     Shrugs     Flex     BIC     PNF                Manual Intervention      STM C/S, suboccipital release, LS, UT  C/S PROM all planes  UQ manual stretching 15 min     Thoracic mobs/manip      CT manip      Rib mobilizations        Therapeutic Exercise and NMR EXR  [x] (37082) Provided verbal/tactile cueing for activities related to strengthening, flexibility, endurance, ROM  for improvements in cervical, postural, scapular, scapulothoracic and UE control with self care, reaching, carrying, lifting, house/yardwork, driving/computer work.    [] (02584) Provided verbal/tactile cueing for activities related to improving balance, coordination, kinesthetic sense, posture, motor skill, proprioception  to assist with cervical, scapular, scapulothoracic and UE control with self care, reaching, carrying, lifting, house/yardwork, driving/computer work. Therapeutic Activities:    [] (22889 or 17103) Provided verbal/tactile cueing for activities related to improving balance, coordination, kinesthetic sense, posture, motor skill, proprioception and motor activation to allow for proper function of cervical, scapular, scapulothoracic and UE control with self care, carrying, lifting, driving/computer work.      Home Exercise Program:    [x] (66752) Reviewed/Progressed HEP activities related to strengthening, flexibility, endurance, ROM of cervical, scapular, scapulothoracic and UE control with self care, reaching, carrying, lifting, house/yardwork, driving/computer work  [] (77824) Reviewed/Progressed HEP activities related to improving balance, coordination, kinesthetic sense, posture, motor skill, proprioception of cervical, scapular, scapulothoracic and UE control with self care, reaching, carrying, lifting, house/yardwork, driving/computer work      Manual Treatments:  PROM / STM / Oscillations-Mobs:  G-I, II, III, IV (PA's, Inf., Post.)  [] (12005) Provided manual therapy to mobilize soft tissue/joints of cervical/CT, scapular GHJ and UE for the purpose of decreasing headache, modulating pain, promoting relaxation,  increasing ROM, reducing/eliminating soft tissue swelling/inflammation/restriction, improving soft tissue extensibility and allowing for proper ROM for normal function with self care, reaching, carrying, lifting, house/yardwork, driving/computer work    Modalities:      Charges:  Timed Code Treatment Minutes: 45   Total Treatment Minutes: 45       [] EVAL (LOW) 02260 (typically 20 minutes face-to-face)  [] EVAL (MOD) 34295 (typically 30 minutes face-to-face)  [] EVAL (HIGH) 37102 (typically 45 minutes face-to-face)  [] RE-EVAL     [x] HW(45720) x  2   [] IONTO  [] NMR (70659) x     [] VASO  [x] Manual (38301) x  1    [] Other:  [] TA x      [] Mech Traction (86793)  [] ES(attended) (66389)      [] ES (un) (96879):     GOALS:  Patient stated goal: To be pain free  [] Progressing: [] Met: [] Not Met: [] Adjusted    Therapist goals for Patient:   Short Term Goals: To be achieved in: 2 weeks  1. Independent in HEP and progression per patient tolerance, in order to prevent re-injury. [] Progressing: [x] Met: [] Not Met: [] Adjusted   2. Patient will have a decrease in pain by one grade to facilitate improvement in movement, function, and ADLs as indicated by Functional Deficits. [] Progressing: [x] Met: [] Not Met: [] Adjusted    Long Term Goals: To be achieved in: 6 weeks  1. Disability index score of 15% or less for the NDI to assist with reaching prior level of function.   [x] Progressing: [] Met: [] Not Met: [] Adjusted  2. Patient will demonstrate increased AROM to WNL of cervical/thoracic spine to allow for proper joint functioning as indicated by patients Functional Deficits.     [x] Progressing: [] Met: [] Not Met: [] Adjusted  Patient will demonstrate an increase in postural awareness and control and activation of  Deep cervical stabilizers to allow for proper functional mobility as indicated by patients Functional Deficits.   [x] Progressing: [] Met: [] Not Met: [] Adjusted  4. Patient will return to all functional activities and ADL's without increased symptoms or restriction.   [x] Progressing: [] Met: [] Not Met: [] Adjusted    Overall Progression Towards Functional goals/ Treatment Progress Update:  [] Patient is progressing as expected towards functional goals listed.    [] Progression is slowed due to complexities/Impairments listed.  [] Progression has been slowed due to co-morbidities.  [x] Plan just implemented, too soon to assess goals progression <30days   [] Goals require adjustment due to lack of progress  [] Patient is not progressing as expected and requires additional follow up with physician  [] Other    Prognosis for POC: [x] Good [] Fair  [] Poor      Patient requires continued skilled intervention: [x] Yes  [] No    Treatment/Activity Tolerance:  [x] Patient able to complete treatment  [] Patient limited by fatigue  [] Patient limited by pain     [] Patient limited by other medical complications  [x] Other:    Since initial eval, patient has demonstrated less pain, improved cervical spine ROM, improved DNF and periscapular strength and endurance as well as improved postural awareness increasing participation in ADL's and work related activities. However deficits still remain in ROM 2/2 tissue extensibility dysfunction as well as  strength and endurance. Cont at 1x/week                  Patient Education: Patient educated on diagnosis, prognosis,POC,  HEP    Access Code: [email or web address]      PLAN: See eval  [x] Continue per plan of care [] Alter current plan (see comments above)  [] Plan of care initiated [] Hold pending MD visit [] Discharge      Electronically signed by:  Pollo Danielson PT DPT    Note: If patient does not return for scheduled/ recommended follow up visits, this note will serve as a discharge from care along with most recent update on progress.

## 2023-03-01 ENCOUNTER — HOSPITAL ENCOUNTER (OUTPATIENT)
Dept: PHYSICAL THERAPY | Age: 63
Setting detail: THERAPIES SERIES
Discharge: HOME | End: 2023-03-01
Payer: COMMERCIAL

## 2023-03-01 PROCEDURE — 97110 THERAPEUTIC EXERCISES: CPT

## 2023-03-01 PROCEDURE — 97140 MANUAL THERAPY 1/> REGIONS: CPT

## 2023-03-01 NOTE — FLOWSHEET NOTE
[facility], [facility] [address], [address] [place] [address]  Phone: [phone]   Fax:     [phone]                                                     Physical Therapy Daily Treatment Note  Date:  3/1/2023    Patient Name:  Nida Simon    :  1960  MRN: [id number]  Restrictions/Precautions:    Medical/Treatment Diagnosis Information:  Diagnosis: M54.2 (ICD-10-CM) - Cervicalgia  Treatment Diagnosis: M54.2 (ICD-10-CM) - Cervicalgia  Insurance/Certification information:  PT Insurance Information: [address]  Physician Information:   Rao Garcia  Has the plan of care been signed (Y/N):        []  Yes  [x]  No     Date of Patient follow up with Physician:       Is this a Progress Report:     []  Yes  [x]  No        If Yes:  Date Range for reporting period:  Beginning   Ending     Progress report will be due (10 Rx or 30 days whichever is less):       Recertification will be due (POC Duration  / 90 days whichever is less):         Visit # Insurance Allowable Auth Required   7  []  Yes []  No        Functional Scale:  NDI = 18% disability   Date assessed:     Latex Allergy:  [x]NO      []YES  Preferred Language for Healthcare:   [x]English       []other:      Pain level:  3/1   2/10     SUBJECTIVE:  3/1 Patient states that she is feeling less stiffness in her neck    OBJECTIVE:  3/1  Increased TTP noted to R sided parapsinals  Observation:   Test measurements:      RESTRICTIONS/PRECAUTIONS:   Exercises/Interventions:   Exercise/Equipment Resistance/Repetitions Other comments   Stretching/PROM     CROM X 10 each way    Chin tuck 2 x 10 x 5\"    Chin tuck pull apart 3 x 10 ^    Levater scap stretch     Open Books 2 x 10 3/1   T/S Extension seated 2 x 10    Shoulder Rolls 2 x 10    Scap Retraction 2 x 10    Isometrics     Retraction     shrugs     Cervical Flexion      Cervical Extension     Cervical sidebending     A walkout 2 x 10 Red 1/23   Bilateral ER Iso 3 x 30\" 1/23   W's  Red 3 x 10 2/9   Supine X's 2 x 10 2/22   PRE's     External Rotation     Internal Rotation     Horizontal Abduction     Biceps     Triceps     Shrugs     Horizontal Abd with ER     Reverse Flys     EXT     Flexion     Abduction          Cable Column/Theraband     Scapular Retraction 3 x 10 Blue 2/9   External Rotation     Internal Rotation     Ext     TRIC     Lats     Shrugs     Flex     BIC     PNF                Manual Intervention      STM C/S, suboccipital release, LS, UT  C/S PROM all planes  UQ manual stretching 15 min     Thoracic mobs/manip      CT manip      Rib mobilizations        Therapeutic Exercise and NMR EXR  [x] (28663) Provided verbal/tactile cueing for activities related to strengthening, flexibility, endurance, ROM  for improvements in cervical, postural, scapular, scapulothoracic and UE control with self care, reaching, carrying, lifting, house/yardwork, driving/computer work.    [] (83848) Provided verbal/tactile cueing for activities related to improving balance, coordination, kinesthetic sense, posture, motor skill, proprioception  to assist with cervical, scapular, scapulothoracic and UE control with self care, reaching, carrying, lifting, house/yardwork, driving/computer work. Therapeutic Activities:    [] (95980 or 11070) Provided verbal/tactile cueing for activities related to improving balance, coordination, kinesthetic sense, posture, motor skill, proprioception and motor activation to allow for proper function of cervical, scapular, scapulothoracic and UE control with self care, carrying, lifting, driving/computer work.      Home Exercise Program:    [x] (57531) Reviewed/Progressed HEP activities related to strengthening, flexibility, endurance, ROM of cervical, scapular, scapulothoracic and UE control with self care, reaching, carrying, lifting, house/yardwork, driving/computer work  [] (72597) Reviewed/Progressed HEP activities related to improving balance, coordination, kinesthetic sense, posture, motor skill, proprioception of cervical, scapular, scapulothoracic and UE control with self care, reaching, carrying, lifting, house/yardwork, driving/computer work      Manual Treatments:  PROM / STM / Oscillations-Mobs:  G-I, II, III, IV (PA's, Inf., Post.)  [] (17313) Provided manual therapy to mobilize soft tissue/joints of cervical/CT, scapular GHJ and UE for the purpose of decreasing headache, modulating pain, promoting relaxation,  increasing ROM, reducing/eliminating soft tissue swelling/inflammation/restriction, improving soft tissue extensibility and allowing for proper ROM for normal function with self care, reaching, carrying, lifting, house/yardwork, driving/computer work    Modalities:      Charges:  Timed Code Treatment Minutes: 45   Total Treatment Minutes: 45       [] EVAL (LOW) 60453 (typically 20 minutes face-to-face)  [] EVAL (MOD) 40769 (typically 30 minutes face-to-face)  [] EVAL (HIGH) 80183 (typically 45 minutes face-to-face)  [] RE-EVAL     [x] IM(15241) x  2   [] IONTO  [] NMR (71149) x     [] VASO  [x] Manual (18958) x  1    [] Other:  [] TA x      [] Mech Traction (92946)  [] ES(attended) (64008)      [] ES (un) (16607):     GOALS:  Patient stated goal: To be pain free  [] Progressing: [] Met: [] Not Met: [] Adjusted    Therapist goals for Patient:   Short Term Goals: To be achieved in: 2 weeks  1. Independent in HEP and progression per patient tolerance, in order to prevent re-injury. [] Progressing: [x] Met: [] Not Met: [] Adjusted   2. Patient will have a decrease in pain by one grade to facilitate improvement in movement, function, and ADLs as indicated by Functional Deficits. [] Progressing: [x] Met: [] Not Met: [] Adjusted    Long Term Goals: To be achieved in: 6 weeks  1. Disability index score of 15% or less for the NDI to assist with reaching prior level of function. [x] Progressing: [] Met: [] Not Met: [] Adjusted  2. Patient will demonstrate increased AROM to WNL of cervical/thoracic spine to allow for proper joint functioning as indicated by patients Functional Deficits. [x] Progressing: [] Met: [] Not Met: [] Adjusted  Patient will demonstrate an increase in postural awareness and control and activation of  Deep cervical stabilizers to allow for proper functional mobility as indicated by patients Functional Deficits. [x] Progressing: [] Met: [] Not Met: [] Adjusted  4. Patient will return to all functional activities and ADL's without increased symptoms or restriction. [x] Progressing: [] Met: [] Not Met: [] Adjusted    Overall Progression Towards Functional goals/ Treatment Progress Update:  [] Patient is progressing as expected towards functional goals listed. [] Progression is slowed due to complexities/Impairments listed. [] Progression has been slowed due to co-morbidities. [x] Plan just implemented, too soon to assess goals progression <30days   [] Goals require adjustment due to lack of progress  [] Patient is not progressing as expected and requires additional follow up with physician  [] Other    Prognosis for POC: [x] Good [] Fair  [] Poor      Patient requires continued skilled intervention: [x] Yes  [] No    Treatment/Activity Tolerance:  [x] Patient able to complete treatment  [] Patient limited by fatigue  [] Patient limited by pain     [] Patient limited by other medical complications  [x] Other: Cont improvements in cervical spine mobility as well as static and dynamic postural awareness.  Less cueing required for proper form during UE there ex                  Patient Education: Patient educated on diagnosis, prognosis,POC, HEP    Access Code: [email or web address]      PLAN: See eval  [x] Continue per plan of care [] Alter current plan (see comments above)  [] Plan of care initiated [] Hold pending MD visit [] Discharge      Electronically signed by: Pollo Danielson, PT, DPT    Note: If patient does not return for scheduled/ recommended follow up visits, this note will serve as a discharge from care along with most recent update on progress.

## 2023-03-15 ENCOUNTER — OFFICE VISIT (OUTPATIENT)
Dept: PRIMARY CARE CLINIC | Age: 63
End: 2023-03-15
Payer: COMMERCIAL

## 2023-03-15 VITALS
HEART RATE: 79 BPM | BODY MASS INDEX: 34.15 KG/M2 | OXYGEN SATURATION: 97 % | HEIGHT: 64 IN | WEIGHT: 200 LBS | SYSTOLIC BLOOD PRESSURE: 128 MMHG | TEMPERATURE: 97.4 F | DIASTOLIC BLOOD PRESSURE: 72 MMHG | RESPIRATION RATE: 18 BRPM

## 2023-03-15 DIAGNOSIS — I10 PRIMARY HYPERTENSION: Primary | ICD-10-CM

## 2023-03-15 DIAGNOSIS — F33.0 MILD EPISODE OF RECURRENT MAJOR DEPRESSIVE DISORDER (HCC): ICD-10-CM

## 2023-03-15 DIAGNOSIS — M65.332 TRIGGER MIDDLE FINGER OF LEFT HAND: ICD-10-CM

## 2023-03-15 DIAGNOSIS — R73.03 PREDIABETES: ICD-10-CM

## 2023-03-15 DIAGNOSIS — J45.30 MILD PERSISTENT ASTHMA WITHOUT COMPLICATION: ICD-10-CM

## 2023-03-15 LAB — HBA1C MFR BLD: 5.8 %

## 2023-03-15 PROCEDURE — 83036 HEMOGLOBIN GLYCOSYLATED A1C: CPT | Performed by: FAMILY MEDICINE

## 2023-03-15 PROCEDURE — 3078F DIAST BP <80 MM HG: CPT | Performed by: FAMILY MEDICINE

## 2023-03-15 PROCEDURE — 99214 OFFICE O/P EST MOD 30 MIN: CPT | Performed by: FAMILY MEDICINE

## 2023-03-15 PROCEDURE — 3074F SYST BP LT 130 MM HG: CPT | Performed by: FAMILY MEDICINE

## 2023-03-15 RX ORDER — GABAPENTIN 400 MG/1
CAPSULE ORAL 3 TIMES DAILY
COMMUNITY
Start: 2018-02-16

## 2023-03-15 SDOH — ECONOMIC STABILITY: HOUSING INSECURITY
IN THE LAST 12 MONTHS, WAS THERE A TIME WHEN YOU DID NOT HAVE A STEADY PLACE TO SLEEP OR SLEPT IN A SHELTER (INCLUDING NOW)?: NO

## 2023-03-15 SDOH — ECONOMIC STABILITY: INCOME INSECURITY: HOW HARD IS IT FOR YOU TO PAY FOR THE VERY BASICS LIKE FOOD, HOUSING, MEDICAL CARE, AND HEATING?: NOT HARD AT ALL

## 2023-03-15 SDOH — ECONOMIC STABILITY: FOOD INSECURITY: WITHIN THE PAST 12 MONTHS, YOU WORRIED THAT YOUR FOOD WOULD RUN OUT BEFORE YOU GOT MONEY TO BUY MORE.: NEVER TRUE

## 2023-03-15 SDOH — ECONOMIC STABILITY: FOOD INSECURITY: WITHIN THE PAST 12 MONTHS, THE FOOD YOU BOUGHT JUST DIDN'T LAST AND YOU DIDN'T HAVE MONEY TO GET MORE.: NEVER TRUE

## 2023-03-15 NOTE — PROGRESS NOTES
are normal.      Palpations: Abdomen is soft. Tenderness: There is no abdominal tenderness. There is no guarding or rebound. Musculoskeletal:      Cervical back: No muscular tenderness. Right lower leg: No edema. Left lower leg: No edema. Lymphadenopathy:      Cervical: No cervical adenopathy. Skin:     General: Skin is warm and dry. Findings: No rash. Neurological:      General: No focal deficit present. Mental Status: She is alert and oriented to person, place, and time. Cranial Nerves: No cranial nerve deficit. Motor: No weakness. Psychiatric:         Mood and Affect: Mood normal.         Behavior: Behavior normal.         Thought Content: Thought content normal.         Judgment: Judgment normal.           Electronically signed by Ana Red MD on 3/15/2023 at 6:21 PM.    Please note this chart was generated using dragon dictation software. Although every effort was made to ensure the accuracy of this automated transcription, some errors in transcription may have occurred.

## 2023-03-19 RX ORDER — MONTELUKAST SODIUM 10 MG/1
TABLET ORAL
Qty: 90 TABLET | Refills: 1 | Status: SHIPPED | OUTPATIENT
Start: 2023-03-19

## 2023-03-19 RX ORDER — LOSARTAN POTASSIUM 25 MG/1
25 TABLET ORAL DAILY
Qty: 90 TABLET | Refills: 1 | Status: SHIPPED | OUTPATIENT
Start: 2023-03-19

## 2023-03-19 RX ORDER — AMLODIPINE BESYLATE 10 MG/1
TABLET ORAL
Qty: 90 TABLET | Refills: 1 | Status: SHIPPED | OUTPATIENT
Start: 2023-03-19

## 2023-03-19 RX ORDER — FLUOXETINE HYDROCHLORIDE 40 MG/1
CAPSULE ORAL
Qty: 90 CAPSULE | Refills: 1 | Status: SHIPPED | OUTPATIENT
Start: 2023-03-19

## 2023-03-20 ENCOUNTER — HOSPITAL ENCOUNTER (EMERGENCY)
Age: 63
Discharge: HOME | End: 2023-03-20
Attending: EMERGENCY MEDICINE
Payer: COMMERCIAL

## 2023-03-20 VITALS
HEIGHT: 65 IN | WEIGHT: 201.5 LBS | SYSTOLIC BLOOD PRESSURE: 189 MMHG | DIASTOLIC BLOOD PRESSURE: 96 MMHG | HEART RATE: 73 BPM | RESPIRATION RATE: 16 BRPM | BODY MASS INDEX: 33.57 KG/M2 | OXYGEN SATURATION: 99 % | TEMPERATURE: 97.5 F

## 2023-03-20 DIAGNOSIS — M54.2 NECK PAIN: Primary | ICD-10-CM

## 2023-03-20 PROCEDURE — 6360000002 HC RX W HCPCS

## 2023-03-20 PROCEDURE — 6370000000 HC RX 637 (ALT 250 FOR IP)

## 2023-03-20 PROCEDURE — 99284 EMERGENCY DEPT VISIT MOD MDM: CPT

## 2023-03-20 RX ORDER — METHOCARBAMOL 500 MG/1
500 TABLET, FILM COATED ORAL 4 TIMES DAILY
Qty: 20 TABLET | Refills: 0 | Status: SHIPPED | OUTPATIENT
Start: 2023-03-20 | End: 2023-03-25

## 2023-03-20 RX ORDER — LIDOCAINE 50 MG/G
1 PATCH TOPICAL DAILY
Qty: 5 PATCH | Refills: 0 | Status: SHIPPED | OUTPATIENT
Start: 2023-03-20 | End: 2023-03-25

## 2023-03-20 RX ORDER — KETOROLAC TROMETHAMINE 30 MG/ML
30 INJECTION, SOLUTION INTRAMUSCULAR; INTRAVENOUS EVERY 6 HOURS
Status: DISCONTINUED | OUTPATIENT
Start: 2023-03-20 | End: 2023-03-21 | Stop reason: HOSPADM

## 2023-03-20 RX ORDER — DIAZEPAM 5 MG/1
5 TABLET ORAL ONCE
Status: COMPLETED | OUTPATIENT
Start: 2023-03-20 | End: 2023-03-20

## 2023-03-20 RX ORDER — LIDOCAINE 4 G/G
1 PATCH TOPICAL DAILY
Status: DISCONTINUED | OUTPATIENT
Start: 2023-03-20 | End: 2023-03-21 | Stop reason: HOSPADM

## 2023-03-20 RX ADMIN — DIAZEPAM 5 MG: 5 TABLET ORAL at 20:25

## 2023-03-20 RX ADMIN — KETOROLAC TROMETHAMINE 30 MG: 30 INJECTION, SOLUTION INTRAMUSCULAR at 20:27

## 2023-03-20 ASSESSMENT — PAIN - FUNCTIONAL ASSESSMENT: PAIN_FUNCTIONAL_ASSESSMENT: 0-10

## 2023-03-20 ASSESSMENT — PAIN DESCRIPTION - ORIENTATION
ORIENTATION: MID
ORIENTATION: LEFT

## 2023-03-20 ASSESSMENT — PAIN DESCRIPTION - FREQUENCY: FREQUENCY: CONTINUOUS

## 2023-03-20 ASSESSMENT — ENCOUNTER SYMPTOMS
DIARRHEA: 0
VOMITING: 0
BACK PAIN: 1
SHORTNESS OF BREATH: 0
NAUSEA: 0
CONSTIPATION: 0
CHEST TIGHTNESS: 0

## 2023-03-20 ASSESSMENT — PAIN DESCRIPTION - DESCRIPTORS: DESCRIPTORS: ACHING

## 2023-03-20 ASSESSMENT — PAIN DESCRIPTION - LOCATION
LOCATION: NECK;BACK;SHOULDER
LOCATION: NECK

## 2023-03-20 ASSESSMENT — PAIN DESCRIPTION - PAIN TYPE: TYPE: ACUTE PAIN

## 2023-03-20 ASSESSMENT — PAIN SCALES - GENERAL
PAINLEVEL_OUTOF10: 10
PAINLEVEL_OUTOF10: 8
PAINLEVEL_OUTOF10: 8

## 2023-03-20 ASSESSMENT — PAIN DESCRIPTION - ONSET: ONSET: ON-GOING

## 2023-03-20 NOTE — ED PROVIDER NOTES
tenderness present. Decreased range of motion. Neurological:      General: No focal deficit present. Mental Status: She is alert and oriented to person, place, and time.                     Jesus Hooper MD  Resident  03/20/23 6884       Jesus Hooper MD  Resident  03/20/23 5760       Jesus Hooper MD  Resident  03/20/23 5639

## 2023-03-21 ENCOUNTER — CARE COORDINATION (OUTPATIENT)
Dept: OTHER | Facility: CLINIC | Age: 63
End: 2023-03-21

## 2023-03-21 NOTE — ED NOTES
Pt discharged from ED in stable condition. Discharge instruction explain, all question answered. Prescription given. Pt walked to lobby independently.        Barbie Garcia, GEMMA  03/20/23 2200

## 2023-03-21 NOTE — CARE COORDINATION
Ambulatory Care Coordination Note    ACM attempted to reach patient for introduction to Associate Care Management related to ED visit 3/20/2023. HIPAA compliant message left requesting a return phone call. Will attempt to outreach patient again.

## 2023-03-21 NOTE — DISCHARGE INSTRUCTIONS
You came into the emergency department today for neck pain and back pain. You described the pain as 10 out of 10 and could not flex your neck to the right side due to pain and stiffness. You were given Valium, Toradol, lidocaine patch, lidocaine injection all of which helped decrease your pain to 4 out of 10. Please monitor the pain and return to the emergency department if you have any numbness, tingling, extreme worsening of symptoms that may be concerning to you. Please follow-up with your primary care physician for further care of this neck problem. You are being sent home with a prescription for lidocaine patch to put on your neck as needed. Please keep it on for 12 hours and have 12 hours with it off. You are also being given a prescription for Robaxin 500 mg tablets. Please take 1 tablet by mouth 4 times daily for 5 days.

## 2023-03-22 ENCOUNTER — CARE COORDINATION (OUTPATIENT)
Dept: OTHER | Facility: CLINIC | Age: 63
End: 2023-03-22

## 2023-03-22 NOTE — PROGRESS NOTES
PROGRESS NOTE  Date of Service:  3/23/2023    SUBJECTIVE:  Patient ID: Christiana Llamas is a 59 y.o. female    ASSESSMENT  1. Cervicalgia        PLAN:   1. Cervicalgia  -     cyclobenzaprine (FLEXERIL) 10 MG tablet; Take 1 tablet by mouth 2 times daily as needed for Muscle spasms, Disp-30 tablet, R-0Normal  -     predniSONE (DELTASONE) 20 MG tablet; Take 3 tablets by mouth daily for 5 days, Disp-15 tablet, R-0Normal  -     MRI CERVICAL SPINE WO CONTRAST; Future  -     traMADol (ULTRAM) 50 MG tablet; Take 1 tablet by mouth every 8 hours as needed for Pain for up to 3 days. Intended supply: 3 days. Take lowest dose possible to manage pain Max Daily Amount: 150 mg, Disp-9 tablet, R-0Normal  -     triamcinolone acetonide (KENALOG-40) injection 40 mg; 40 mg, IntraMUSCular, ONCE, 1 dose, On Thu 3/23/23 at 1345   Worsening pain now severe. OARRs reviewed  Await mri- plan for referral after result  Reviewed risks and side effects of steroids    Return for with testing results. HPI:     In her usual state of health until 3/20/23 when she began having increased pain at her left neck/upper back radiating to her shoulder  This became severe  No history trauma or injury    Had been doing P T and symptoms had been improving    Went to er  Reviewed records  Since then using lidocaine patch  Methocarbamol not as effective as flexeril for her  Not given steroids  Has p T appointment later today  Has not done other modalities pf pain intervention          Patient's medications, allergies, past medical, surgical, social and family histories were reviewed and updated as appropriate. OBJECTIVE:  Vitals:    03/23/23 1243   BP: (!) 146/82   Site: Left Upper Arm   Position: Sitting   Cuff Size: Large Adult   Pulse: 74   Resp: 18   Temp: 97.8 °F (36.6 °C)   TempSrc: Temporal   SpO2: 98%   Weight: 200 lb 12.8 oz (91.1 kg)   Height: 5' 4.5\" (1.638 m)      Body mass index is 33.93 kg/m².    Physical

## 2023-03-23 ENCOUNTER — OFFICE VISIT (OUTPATIENT)
Dept: PRIMARY CARE CLINIC | Age: 63
End: 2023-03-23

## 2023-03-23 ENCOUNTER — HOSPITAL ENCOUNTER (OUTPATIENT)
Dept: PHYSICAL THERAPY | Age: 63
Setting detail: THERAPIES SERIES
Discharge: HOME | End: 2023-03-23
Payer: COMMERCIAL

## 2023-03-23 ENCOUNTER — OFFICE VISIT (OUTPATIENT)
Dept: ORTHOPEDIC SURGERY | Age: 63
End: 2023-03-23
Payer: COMMERCIAL

## 2023-03-23 VITALS
HEART RATE: 74 BPM | HEIGHT: 65 IN | DIASTOLIC BLOOD PRESSURE: 82 MMHG | TEMPERATURE: 97.8 F | OXYGEN SATURATION: 98 % | RESPIRATION RATE: 18 BRPM | WEIGHT: 200.8 LBS | BODY MASS INDEX: 33.45 KG/M2 | SYSTOLIC BLOOD PRESSURE: 146 MMHG

## 2023-03-23 VITALS — WEIGHT: 200 LBS | BODY MASS INDEX: 33.32 KG/M2 | HEIGHT: 65 IN

## 2023-03-23 DIAGNOSIS — M65.332 TRIGGER MIDDLE FINGER OF LEFT HAND: Primary | ICD-10-CM

## 2023-03-23 DIAGNOSIS — M54.2 CERVICALGIA: Primary | ICD-10-CM

## 2023-03-23 PROCEDURE — 20550 NJX 1 TENDON SHEATH/LIGAMENT: CPT | Performed by: INTERNAL MEDICINE

## 2023-03-23 PROCEDURE — G0283 ELEC STIM OTHER THAN WOUND: HCPCS

## 2023-03-23 PROCEDURE — 99202 OFFICE O/P NEW SF 15 MIN: CPT | Performed by: INTERNAL MEDICINE

## 2023-03-23 PROCEDURE — 97140 MANUAL THERAPY 1/> REGIONS: CPT

## 2023-03-23 RX ORDER — TRAMADOL HYDROCHLORIDE 50 MG/1
50 TABLET ORAL EVERY 8 HOURS PRN
Qty: 9 TABLET | Refills: 0 | Status: SHIPPED | OUTPATIENT
Start: 2023-03-23 | End: 2023-03-26

## 2023-03-23 RX ORDER — CYCLOBENZAPRINE HCL 10 MG
10 TABLET ORAL 2 TIMES DAILY PRN
Qty: 30 TABLET | Refills: 0 | Status: SHIPPED | OUTPATIENT
Start: 2023-03-23

## 2023-03-23 RX ORDER — PREDNISONE 20 MG/1
60 TABLET ORAL DAILY
Qty: 15 TABLET | Refills: 0 | Status: SHIPPED | OUTPATIENT
Start: 2023-03-23 | End: 2023-03-28

## 2023-03-23 RX ORDER — TRIAMCINOLONE ACETONIDE 40 MG/ML
40 INJECTION, SUSPENSION INTRA-ARTICULAR; INTRAMUSCULAR ONCE
Status: COMPLETED | OUTPATIENT
Start: 2023-03-23 | End: 2023-03-23

## 2023-03-23 RX ADMIN — TRIAMCINOLONE ACETONIDE 40 MG: 40 INJECTION, SUSPENSION INTRA-ARTICULAR; INTRAMUSCULAR at 13:15

## 2023-03-23 NOTE — FLOWSHEET NOTE
Extension that trigger her symptoms. Nml reflexes and neg spruling's test. Symptoms seem to be more muscular in nature however patient unable to tolerate extensive assessment due to current pain level. Patient able to tolerate treatment well with reduction in pain level from 9/10 VAS to 5/10 VAS post session. Educated on HEP to keep movement in a pain free ROM. Patient will be undergoing MRI next week and will follow up with PT afterwards. Patient Education: Patient educated on diagnosis, prognosis,POC, HEP    Access Code: [email or web address]      PLAN: See eval  [x] Continue per plan of care [] Alter current plan (see comments above)  [] Plan of care initiated [] Hold pending MD visit [] Discharge      Electronically signed by:  Pollo Danielson PT, DPT, CSCS    Note: If patient does not return for scheduled/ recommended follow up visits, this note will serve as a discharge from care along with most recent update on progress.

## 2023-03-23 NOTE — LETTER
March 28, 2023      Camila Nugent, [address] [facility] [address] [facility],  [address] [place] [address]      Patient: Lbua Castro   MR Number: [id number]   YOB: 1960   Date of Visit: 3/23/2023       Dear Dr. Christa Castano: Thank you for referring Elise Oliver to me for evaluation/treatment. Below are the relevant portions of my assessment and plan of care. Impression: . Encounter Diagnosis   Name Primary? Trigger middle finger of left hand Yes        Long finger left hand      Plan:       Postinjection protocol  Consider elective trigger finger release as needed    The nature of the finding, probable diagnosis and likely treatment was thoroughly discussed with the patient. The options, risks, complications, alternative treatment as well as some of the differential diagnosis was discussed. The patient was thoroughly informed and all questions were answered. the patient indicated understanding and satisfaction with the discussion. Orders:        Orders Placed This Encounter   Procedures    NH INJECTION 1 TENDON SHEATH/LIGAMENT APONEUROSIS           Disclaimer: \"This note was dictated with voice recognition software. Though review and correction are routine, we apologize for any errors. \"             If you have questions, please do not hesitate to call me. I look forward to following Andria along with you.     Sincerely,        Nilsa Owens MD    CC providers:  Camila Nugent, [address] [facility] [address] [address]  [address] [place]

## 2023-03-24 RX ORDER — BETAMETHASONE SODIUM PHOSPHATE AND BETAMETHASONE ACETATE 3; 3 MG/ML; MG/ML
6 INJECTION, SUSPENSION INTRA-ARTICULAR; INTRALESIONAL; INTRAMUSCULAR; SOFT TISSUE ONCE
Status: COMPLETED | OUTPATIENT
Start: 2023-03-24 | End: 2023-03-24

## 2023-03-24 RX ORDER — OMEPRAZOLE 40 MG/1
CAPSULE, DELAYED RELEASE ORAL
Qty: 90 CAPSULE | Refills: 1 | Status: SHIPPED | OUTPATIENT
Start: 2023-03-24

## 2023-03-24 RX ORDER — BUPIVACAINE HYDROCHLORIDE 2.5 MG/ML
1 INJECTION, SOLUTION INFILTRATION; PERINEURAL ONCE
Status: COMPLETED | OUTPATIENT
Start: 2023-03-24 | End: 2023-03-24

## 2023-03-24 RX ADMIN — BETAMETHASONE SODIUM PHOSPHATE AND BETAMETHASONE ACETATE 6 MG: 3; 3 INJECTION, SUSPENSION INTRA-ARTICULAR; INTRALESIONAL; INTRAMUSCULAR; SOFT TISSUE at 09:52

## 2023-03-24 RX ADMIN — BUPIVACAINE HYDROCHLORIDE 2.5 MG: 2.5 INJECTION, SOLUTION INFILTRATION; PERINEURAL at 09:53

## 2023-03-24 NOTE — TELEPHONE ENCOUNTER
Medication:   Requested Prescriptions     Pending Prescriptions Disp Refills    omeprazole (PRILOSEC) 40 MG delayed release capsule [Pharmacy Med Name: OMEPRAZOLE 40MG CPDR] 90 capsule 1     Sig: TAKE 1 CAPSULE BY MOUTH ONE TIME A DAY     Last Filled:  6.14.22    Last appt: 3.24.23  Next appt: Visit date not found    Last OARRS: No flowsheet data found.

## 2023-03-28 ENCOUNTER — HOSPITAL ENCOUNTER (OUTPATIENT)
Dept: PHYSICAL THERAPY | Age: 63
Setting detail: THERAPIES SERIES
Discharge: HOME | End: 2023-03-28
Payer: COMMERCIAL

## 2023-03-28 ENCOUNTER — CARE COORDINATION (OUTPATIENT)
Dept: OTHER | Facility: CLINIC | Age: 63
End: 2023-03-28

## 2023-03-28 PROCEDURE — 97140 MANUAL THERAPY 1/> REGIONS: CPT

## 2023-03-28 PROCEDURE — 97110 THERAPEUTIC EXERCISES: CPT

## 2023-03-28 NOTE — CARE COORDINATION
Associate Care Manager (ACM) sent [email or web address] message for Associate Care Management follow up related to intro to ACM. See patient message for details and response (as appropriate). No further outreach scheduled with this ACM, ACM will sign off care team at this time. Episode of Care resolved. Patient  has this ACM's contact information if future needs arise.

## 2023-03-28 NOTE — FLOWSHEET NOTE
Isometrics     Retraction     shrugs     Cervical Flexion      Cervical Extension     Cervical sidebending     PRE's     External Rotation     Internal Rotation     Horizontal Abduction     Biceps     Triceps     Shrugs     Horizontal Abd with ER     Reverse Flys     EXT     Flexion     Abduction          Cable Column/Theraband     Webslide Row to [place] 3 x 10 ^ 3/28   External Rotation     Internal Rotation     Ext     TRIC     Lats     Shrugs     Flex     BIC     PNF                Manual Intervention      STM C/S, suboccipital release, LS, UT  C/S PROM all planes  UQ manual stretching 15 min     Thoracic mobs/manip      CT manip      Rib mobilizations        Therapeutic Exercise and NMR EXR  [] (29595) Provided verbal/tactile cueing for activities related to strengthening, flexibility, endurance, ROM  for improvements in cervical, postural, scapular, scapulothoracic and UE control with self care, reaching, carrying, lifting, house/yardwork, driving/computer work.    [] (44433) Provided verbal/tactile cueing for activities related to improving balance, coordination, kinesthetic sense, posture, motor skill, proprioception  to assist with cervical, scapular, scapulothoracic and UE control with self care, reaching, carrying, lifting, house/yardwork, driving/computer work. Therapeutic Activities:    [] (00743 or 97230) Provided verbal/tactile cueing for activities related to improving balance, coordination, kinesthetic sense, posture, motor skill, proprioception and motor activation to allow for proper function of cervical, scapular, scapulothoracic and UE control with self care, carrying, lifting, driving/computer work.      Home Exercise Program:    [x] (04366) Reviewed/Progressed HEP activities related to strengthening, flexibility, endurance, ROM of cervical, scapular, scapulothoracic and UE control with self care, reaching, carrying, lifting, house/yardwork, driving/computer work  [] (04731)

## 2023-03-29 ENCOUNTER — APPOINTMENT (OUTPATIENT)
Dept: PHYSICAL THERAPY | Age: 63
End: 2023-03-29
Payer: COMMERCIAL

## 2023-04-05 ENCOUNTER — HOSPITAL ENCOUNTER (OUTPATIENT)
Dept: MRI IMAGING | Age: 63
Discharge: HOME | End: 2023-04-05
Payer: COMMERCIAL

## 2023-04-05 ENCOUNTER — HOSPITAL ENCOUNTER (OUTPATIENT)
Dept: PHYSICAL THERAPY | Age: 63
Setting detail: THERAPIES SERIES
Discharge: HOME | End: 2023-04-05
Payer: COMMERCIAL

## 2023-04-05 DIAGNOSIS — M54.2 CERVICALGIA: ICD-10-CM

## 2023-04-05 PROCEDURE — 97140 MANUAL THERAPY 1/> REGIONS: CPT

## 2023-04-05 PROCEDURE — 97110 THERAPEUTIC EXERCISES: CPT

## 2023-04-05 PROCEDURE — 72141 MRI NECK SPINE W/O DYE: CPT

## 2023-04-05 NOTE — FLOWSHEET NOTE
tolerate re initiation of all therapeutic exercises with good tolerance and without c/o. Patient Education: Patient educated on diagnosis, prognosis,POC, HEP    Access Code: [email or web address]      PLAN: See eval  [x] Continue per plan of care [] Alter current plan (see comments above)  [] Plan of care initiated [] Hold pending MD visit [] Discharge      Electronically signed by:  Pollo Danielson PT, DPT, CSCS    Note: If patient does not return for scheduled/ recommended follow up visits, this note will serve as a discharge from care along with most recent update on progress.

## 2023-04-06 DIAGNOSIS — M50.20 CERVICAL DISC HERNIATION: ICD-10-CM

## 2023-04-06 DIAGNOSIS — M54.2 CERVICALGIA: Primary | ICD-10-CM

## 2023-04-06 DIAGNOSIS — M48.02 CERVICAL STENOSIS OF SPINAL CANAL: ICD-10-CM

## 2023-04-19 DIAGNOSIS — E89.0 HYPOTHYROIDISM, POSTSURGICAL: ICD-10-CM

## 2023-04-19 RX ORDER — LEVOTHYROXINE SODIUM 175 UG/1
175 TABLET ORAL EVERY OTHER DAY
Qty: 45 TABLET | Refills: 0 | Status: SHIPPED | OUTPATIENT
Start: 2023-04-19

## 2023-04-19 RX ORDER — LEVOTHYROXINE SODIUM 0.15 MG/1
150 TABLET ORAL EVERY OTHER DAY
Qty: 45 TABLET | Refills: 0 | Status: SHIPPED | OUTPATIENT
Start: 2023-04-19

## 2023-05-11 ENCOUNTER — OFFICE VISIT (OUTPATIENT)
Dept: ENDOCRINOLOGY | Age: 63
End: 2023-05-11
Payer: COMMERCIAL

## 2023-05-11 VITALS
SYSTOLIC BLOOD PRESSURE: 151 MMHG | RESPIRATION RATE: 14 BRPM | DIASTOLIC BLOOD PRESSURE: 91 MMHG | HEIGHT: 65 IN | OXYGEN SATURATION: 98 % | TEMPERATURE: 98 F | BODY MASS INDEX: 32.82 KG/M2 | HEART RATE: 73 BPM | WEIGHT: 197 LBS

## 2023-05-11 DIAGNOSIS — C73 THYROID CANCER (HCC): ICD-10-CM

## 2023-05-11 DIAGNOSIS — E66.9 CLASS 1 OBESITY WITH BODY MASS INDEX (BMI) OF 33.0 TO 33.9 IN ADULT, UNSPECIFIED OBESITY TYPE, UNSPECIFIED WHETHER SERIOUS COMORBIDITY PRESENT: ICD-10-CM

## 2023-05-11 DIAGNOSIS — E89.0 HYPOTHYROIDISM, POSTSURGICAL: ICD-10-CM

## 2023-05-11 DIAGNOSIS — E89.0 HYPOTHYROIDISM, POSTSURGICAL: Primary | ICD-10-CM

## 2023-05-11 DIAGNOSIS — Z78.0 MENOPAUSE: ICD-10-CM

## 2023-05-11 LAB
ALBUMIN SERPL-MCNC: 4.9 G/DL (ref 3.4–5)
ALBUMIN/GLOB SERPL: 2.2 {RATIO} (ref 1.1–2.2)
ALP SERPL-CCNC: 90 U/L (ref 40–129)
ALT SERPL-CCNC: 41 U/L (ref 10–40)
ANION GAP SERPL CALCULATED.3IONS-SCNC: 15 MMOL/L (ref 3–16)
ANTI-THYROGLOB ABS: 12 IU/ML
AST SERPL-CCNC: 41 U/L (ref 15–37)
BILIRUB SERPL-MCNC: 0.5 MG/DL (ref 0–1)
BUN SERPL-MCNC: 19 MG/DL (ref 7–20)
CALCIUM SERPL-MCNC: 9.2 MG/DL (ref 8.3–10.6)
CHLORIDE SERPL-SCNC: 98 MMOL/L (ref 99–110)
CO2 SERPL-SCNC: 26 MMOL/L (ref 21–32)
CREAT SERPL-MCNC: 0.7 MG/DL (ref 0.6–1.2)
GFR SERPLBLD CREATININE-BSD FMLA CKD-EPI: >60 ML/MIN/{1.73_M2}
GLUCOSE P FAST SERPL-MCNC: 96 MG/DL (ref 70–99)
POTASSIUM SERPL-SCNC: 4.8 MMOL/L (ref 3.5–5.1)
PROT SERPL-MCNC: 7.1 G/DL (ref 6.4–8.2)
SODIUM SERPL-SCNC: 139 MMOL/L (ref 136–145)
T3FREE SERPL-MCNC: 1.8 PG/ML (ref 2.3–4.2)
T4 FREE SERPL-MCNC: 1.1 NG/DL (ref 0.9–1.8)
TSH SERPL DL<=0.005 MIU/L-ACNC: 6.39 UIU/ML (ref 0.27–4.2)

## 2023-05-11 PROCEDURE — 99214 OFFICE O/P EST MOD 30 MIN: CPT | Performed by: INTERNAL MEDICINE

## 2023-05-11 PROCEDURE — 3077F SYST BP >= 140 MM HG: CPT | Performed by: INTERNAL MEDICINE

## 2023-05-11 PROCEDURE — 3080F DIAST BP >= 90 MM HG: CPT | Performed by: INTERNAL MEDICINE

## 2023-05-11 RX ORDER — LEVOTHYROXINE SODIUM 0.15 MG/1
150 TABLET ORAL EVERY OTHER DAY
Qty: 45 TABLET | Refills: 1 | Status: SHIPPED | OUTPATIENT
Start: 2023-05-11

## 2023-05-11 RX ORDER — LEVOTHYROXINE SODIUM 175 UG/1
175 TABLET ORAL EVERY OTHER DAY
Qty: 45 TABLET | Refills: 1 | Status: SHIPPED | OUTPATIENT
Start: 2023-05-11

## 2023-05-11 NOTE — PROGRESS NOTES
SUBJECTIVE:  Celeste Quiles is a 59 y.o. female who is being evaluated for hypothyroidism. 1. Hypothyroidism, postsurgical  This started in 2015. Patient was diagnosed with thyroid cancer, postsurgical hypothyroidism. The problem has been unchanged. Previous thyroid studies include: TSH and free thyroxine. Patient started medication in 2015. Currently patient is on: levothyroxine. Misses  0 doses a month. Current complaints: denies fatigue, weight changes, heat/cold intolerance, bowel/skin changes or CVS symptoms. Has nail changes, dry skin. Surgeon Dr. Kaitlyn Navarro. Not followed by him  No I-131 Tx    2. Thyroid cancer ([facility] [address]  No swelling or lump sensation in the neck area    2/13/2015 Operations/Procedures: Total thyroidectomy, central neck dissection 2/13/15    Doing well s/p total+CND for encapsulated FV PTC [id number] (0/2 LN)  LT4, TSH/Tg/renal today, GOVEA +/- but unlikely unless Tg elevated  Clinical History:    Pre-Operative Diagnosis: Follicular variant of papillary thyroid Ca, right    thyroid nodule as [facility] FNA cytology ANB-15-20. Post-Operative Diagnosis:     Same    Specimen(s) Submitted:   A. Total Thyroid; B. Right Central Node Dissection        CPT Code(s):   A0926362 X 2    Office Info:   [id number]        FINAL DIAGNOSIS:        A. Thyroid, total thyroidectomy:    - Papillary carcinoma, right lobe, completely excised; see synoptic report. B.  Right central node dissection:    - Two lymph nodes negative for metastatic carcinoma; see synoptic report. SYNOPTIC REPORT    THYROID GLAND: Resection        Procedure:   Total thyroidectomy        Lymph Node Sampling:  Right central node dissection        Tumor Focality:  Unifocal        Tumor Laterality:  Right lobe        Tumor Size:  Greatest dimension: 1.4 cm (gross examination)        Histologic Type:  Papillary carcinoma, follicular variant, encapsulated,    without capsular invasion        Margins:  Margins uninvolved by

## 2023-05-14 ENCOUNTER — TELEPHONE (OUTPATIENT)
Dept: ENDOCRINOLOGY | Age: 63
End: 2023-05-14

## 2023-05-14 DIAGNOSIS — M54.2 CERVICALGIA: ICD-10-CM

## 2023-05-15 LAB — THYROGLOB SERPL-MCNC: <0.5 NG/ML (ref 1.3–31.8)

## 2023-05-15 RX ORDER — CYCLOBENZAPRINE HCL 10 MG
10 TABLET ORAL 2 TIMES DAILY PRN
Qty: 10 TABLET | Refills: 0 | Status: SHIPPED | OUTPATIENT
Start: 2023-05-15

## 2023-05-15 NOTE — TELEPHONE ENCOUNTER
Pt called back, gave pt verbatim message. Pt stated the levothyroxine change is okay     No further questions at this time.

## 2023-05-15 NOTE — TELEPHONE ENCOUNTER
Gave patient a small quantity if still having problems with neck I recommend making appointment with dr Danyelle Blanton.

## 2023-05-15 NOTE — TELEPHONE ENCOUNTER
Medication:   Requested Prescriptions     Pending Prescriptions Disp Refills    cyclobenzaprine (FLEXERIL) 10 MG tablet 30 tablet 0     Sig: Take 1 tablet by mouth 2 times daily as needed for Muscle spasms        Last Filled:  03/23/2023 # 30    Patient Phone Number: [phone] (home)     Last appt: 3/23/2023   Next appt: Visit date not found    Last OARRS: No flowsheet data found.

## 2023-05-22 ENCOUNTER — OFFICE VISIT (OUTPATIENT)
Dept: PRIMARY CARE CLINIC | Age: 63
End: 2023-05-22
Payer: COMMERCIAL

## 2023-05-22 VITALS
OXYGEN SATURATION: 95 % | HEART RATE: 84 BPM | HEIGHT: 65 IN | TEMPERATURE: 97.4 F | BODY MASS INDEX: 33.29 KG/M2 | RESPIRATION RATE: 18 BRPM | DIASTOLIC BLOOD PRESSURE: 74 MMHG | SYSTOLIC BLOOD PRESSURE: 136 MMHG

## 2023-05-22 DIAGNOSIS — M50.20 CERVICAL DISC HERNIATION: Primary | ICD-10-CM

## 2023-05-22 PROCEDURE — 3078F DIAST BP <80 MM HG: CPT | Performed by: FAMILY MEDICINE

## 2023-05-22 PROCEDURE — 99214 OFFICE O/P EST MOD 30 MIN: CPT | Performed by: FAMILY MEDICINE

## 2023-05-22 PROCEDURE — 3074F SYST BP LT 130 MM HG: CPT | Performed by: FAMILY MEDICINE

## 2023-05-22 RX ORDER — METHYLPREDNISOLONE 4 MG/1
TABLET ORAL
Qty: 1 KIT | Refills: 0 | Status: SHIPPED | OUTPATIENT
Start: 2023-05-22

## 2023-05-22 RX ORDER — GABAPENTIN 400 MG/1
400 CAPSULE ORAL 3 TIMES DAILY
Qty: 90 CAPSULE | Refills: 0 | Status: SHIPPED | OUTPATIENT
Start: 2023-05-22 | End: 2023-06-21

## 2023-05-22 NOTE — PROGRESS NOTES
PROGRESS NOTE  Date of Service:  5/22/2023    SUBJECTIVE:  Patient ID: Rebekah Lozoya is a 59 y.o. female    ASSESSMENT  1. Cervical disc herniation        PLAN:   1. Cervical disc herniation  -     methylPREDNISolone (MEDROL DOSEPACK) 4 MG tablet; Take by mouth., Disp-1 kit, R-0Normal  -     gabapentin (NEURONTIN) 400 MG capsule; Take 1 capsule by mouth 3 times daily for 30 days. , Disp-90 capsule, R-0Normal     We will send a prescription for a steroid pack as well as a refill of her current gabapentin dosing however discussed that with these findings on her MRI further care needs to be under the direction of the neurosurgeon. She does have weakness in her left  strength. Change of muscular function can be persistent and for best outcomes neck step in treatments are needed. These treatments will tide her over until her appointment hopefully which is next week    OARRS reviewed            HPI:     Dr Sonia Castano-   At visit 3/23/23 she reported-  In her usual state of health until 3/20/23 when she began having increased pain at her left neck/upper back radiating to her shoulder  This became severe  No history trauma or injury  Had been doing P T and symptoms had been improving    Went to er  Reviewed records  Since then using lidocaine patch  Methocarbamol not as effective as flexeril for her  Not given steroids  Has p T appointment later today  Has not done other modalities pf pain intervention    With these concerning findings an MRI was ordered and initial treatment with a steroid pack and muscle relaxant was given  MRI on 4/5/2023 showed cervical spondylosis with foraminal stenosis and mild central canal stenosis  Disc herniation at C5-6, C6-7  She was referred to neurosurgery on 4/6/2023  She states that she has not seen the neurosurgeon but contacted [facility] and discussed her case with them.   Planning for an epidural injection  No records available  She reports that her symptoms have worsened

## 2023-06-19 ENCOUNTER — HOSPITAL ENCOUNTER (OUTPATIENT)
Dept: PHYSICAL THERAPY | Age: 63
Setting detail: THERAPIES SERIES
Discharge: HOME | End: 2023-06-19
Payer: COMMERCIAL

## 2023-06-19 PROCEDURE — 97110 THERAPEUTIC EXERCISES: CPT

## 2023-06-19 PROCEDURE — 97140 MANUAL THERAPY 1/> REGIONS: CPT

## 2023-06-19 NOTE — FLOWSHEET NOTE
The [address] [place] [address] [facility], [address], [address] [place] [address]  Phone: [phone]   Fax:     [phone]                                                      Physical Therapy Re-Certification Plan of Care    Dear Dr. Sadia Saez,    We had the pleasure of treating the following patient for physical therapy services at [address] [place] [address]. A summary of our findings can be found in the updated assessment below. This includes our plan of care. If you have any questions or concerns regarding these findings, please do not hesitate to contact me at the office phone number checked above. Thank you for the referral.     Physician Signature:________________________________Date:__________________  By signing above (or electronic signature), therapists plan is approved by physician      Overall Response to Treatment:   []Patient is responding well to treatment and improvement is noted with regards  to goals   []Patient should continue to improve in reasonable time if they continue HEP   []Patient has plateaued and is no longer responding to skilled PT intervention    []Patient is getting worse and would benefit from return to referring MD   []Patient unable to adhere to initial POC   [x]Other: Patient saw improvements initially while under care however returns after last being seen on 4/14 with continuation and exacerbation of pain more recently with radicular symptoms. 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 warranting re evaluation and continuation of PT at this time. Patient is currently waiting further testing at this time and will reinitiate PT.      Date:  6/19/2023    Patient Name:  Ranjan Noel

## 2023-06-21 ENCOUNTER — HOSPITAL ENCOUNTER (OUTPATIENT)
Dept: PHYSICAL THERAPY | Age: 63
Setting detail: THERAPIES SERIES
Discharge: HOME | End: 2023-06-21
Payer: COMMERCIAL

## 2023-06-21 ENCOUNTER — OFFICE VISIT (OUTPATIENT)
Dept: ORTHOPEDIC SURGERY | Age: 63
End: 2023-06-21

## 2023-06-21 VITALS — BODY MASS INDEX: 32.95 KG/M2 | HEIGHT: 64 IN | WEIGHT: 193 LBS

## 2023-06-21 DIAGNOSIS — M50.30 DDD (DEGENERATIVE DISC DISEASE), CERVICAL: ICD-10-CM

## 2023-06-21 DIAGNOSIS — M75.82 ROTATOR CUFF TENDINITIS, LEFT: ICD-10-CM

## 2023-06-21 DIAGNOSIS — M25.512 LEFT SHOULDER PAIN, UNSPECIFIED CHRONICITY: Primary | ICD-10-CM

## 2023-06-21 PROCEDURE — 97140 MANUAL THERAPY 1/> REGIONS: CPT

## 2023-06-21 PROCEDURE — 97110 THERAPEUTIC EXERCISES: CPT

## 2023-06-21 RX ORDER — METHYLPREDNISOLONE ACETATE 40 MG/ML
40 INJECTION, SUSPENSION INTRA-ARTICULAR; INTRALESIONAL; INTRAMUSCULAR; SOFT TISSUE ONCE
Status: COMPLETED | OUTPATIENT
Start: 2023-06-21 | End: 2023-06-21

## 2023-06-21 RX ADMIN — METHYLPREDNISOLONE ACETATE 40 MG: 40 INJECTION, SUSPENSION INTRA-ARTICULAR; INTRALESIONAL; INTRAMUSCULAR; SOFT TISSUE at 16:38

## 2023-06-21 NOTE — PROGRESS NOTES
6/21/23 4:40 PM     Lidocaine Injection      NDC: [address]-13    Lot Number: [id number]    Body Part: left shoulder
tablet Take 4 tablets by mouth as needed      Flaxseed, Linseed, (FLAX SEED OIL PO) Take by mouth daily       No current facility-administered medications for this visit. Allergies   Allergen Reactions    Zofran [Ondansetron]      elongated qt wave    Naproxen      weakness    Penicillins      Unknown     Demerol Hcl [Meperidine] Rash    Sulfa Antibiotics Nausea And Vomiting       Vital signs:  Ht 5' 4\" (1.626 m)   Wt 193 lb (87.5 kg)   LMP  (LMP Unknown)   BMI 33.13 kg/m²            LEFT Shoulder Exam:    Inspection:  Held in a normal posture. Normal contour at the acromioclavicular joint. No swelling, ecchymosis, or erythema about the shoulder. No atrophy appreciated. Palpation:  No subacromial crepitus. Tender over bicipital groove. Range of Motion: Full passive and active ROM. Normal scapulothoracic rhythm. Strength:  Normal supraspinatus, infraspinatus, and subscapularis muscle strength. Stability: No anterior instability. No posterior instability. Special Tests:  Crossover sign is negative. Belly press sign is negative. Lift off sign is positive. Impingement findings are positive. Labral findings are negative. Speed sign is positive. Other findings: The skin is warm dry and well perfused. 2+ radial pulse. Sensation is intact to light touch over the deltoid. Diminished sensation over ulnar border of hand. RIGHT Comparison Shoulder Exam:    Inspection:  Held in a normal posture. Normal contour at the acromioclavicular joint. No swelling, ecchymosis, or erythema about the shoulder. No atrophy appreciated. Palpation:  No subacromial crepitus. Range of Motion: Full passive and active ROM. Normal scapulothoracic rhythm. Strength:  Normal supraspinatus, infraspinatus, and subscapularis muscle strength. Stability: No anterior instability. No posterior instability. Special Tests:  Crossover sign is negative. Belly press sign is negative. Lift off sign is negative.

## 2023-06-22 NOTE — FLOWSHEET NOTE
[address] [facility], [facility] [address] [place] [address], [address] [place] [address]  Phone: [phone]   Fax:     [phone]                                                      Physical Therapy Daily Treatment Note     Date:  2023    Patient Name:  Reed Davis    :  1960  MRN: [id number]  Restrictions/Precautions:    Medical/Treatment Diagnosis Information:  Diagnosis: M54.2 (ICD-10-CM) - Cervicalgia  Treatment Diagnosis: M54.2 (ICD-10-CM) - Cervicalgia  Insurance/Certification information:  PT Insurance Information: [address]  Physician Information:    Ronda Morris  Has the plan of care been signed (Y/N):        [x]  Yes  []  No     Date of Patient follow up with Physician:       Is this a Progress Report:     []  Yes  [x]  No        If Yes:  Date Range for reporting period:  Beginning   Ending     Progress report will be due (10 Rx or 30 days whichever is less):       Recertification will be due (POC Duration  / 90 days whichever is less):         Visit # Insurance Allowable Auth Required   13 30/yr []  Yes [x]  No        Functional Scale:  NDI = 18% disability   Date assessed:                                   NDI 50 = 52% disability                                                         3/23                                  NDI = 44% disability                                                               Latex Allergy:  [x]NO      []YES  Preferred Language for Healthcare:   [x]English       []other:      Pain level:    8-9/10     SUBJECTIVE:   Patient states that she just got out from seeing the doctor for her shoulder and he confirmed he thinks the symptoms are coming from my neck as well but did give me a cortisone shot in my shoulder    OBJECTIVE:     Marked TTP 8/10 L cervical paraspinals  Observation:   Test measurements:      RESTRICTIONS/PRECAUTIONS:   Exercises/Interventions:

## 2023-06-30 ENCOUNTER — HOSPITAL ENCOUNTER (OUTPATIENT)
Dept: PHYSICAL THERAPY | Age: 63
Setting detail: THERAPIES SERIES
End: 2023-06-30
Payer: COMMERCIAL

## 2023-07-10 ENCOUNTER — HOSPITAL ENCOUNTER (OUTPATIENT)
Dept: PHYSICAL THERAPY | Age: 63
Setting detail: THERAPIES SERIES
Discharge: HOME | End: 2023-07-10

## 2023-07-10 NOTE — FLOWSHEET NOTE
Physical Therapy  Cancellation/No-show Note  Patient Name:  Luna Henderson  :  1960   Date:  7/10/2023  Cancelled visits to date: 0  No-shows to date: 0    For today's appointment patient:  [x]  Cancelled  []  Rescheduled appointment  []  No-show     Reason given by patient:  []  Patient ill  []  Conflicting appointment  []  No transportation    []  Conflict with work  []  No reason given  [x]  Other:  unable to make it back in time    Comments:      Electronically signed by:  Annie Danielson PT, DPT

## 2023-07-17 ENCOUNTER — HOSPITAL ENCOUNTER (OUTPATIENT)
Dept: PHYSICAL THERAPY | Age: 63
Setting detail: THERAPIES SERIES
Discharge: HOME | End: 2023-07-17
Payer: COMMERCIAL

## 2023-07-17 PROCEDURE — 97110 THERAPEUTIC EXERCISES: CPT

## 2023-07-17 PROCEDURE — 97140 MANUAL THERAPY 1/> REGIONS: CPT

## 2023-07-17 NOTE — FLOWSHEET NOTE
[facility] [address], [address] [place] [address], [facility]5 [facility]  Phone: [phone]   Fax:     [phone]                                                      Physical Therapy Daily Treatment Note     Date:  2023    Patient Name:  Tyrone Gibson    :  1960  MRN: [id number]  Restrictions/Precautions:    Medical/Treatment Diagnosis Information:  Diagnosis: M54.2 (ICD-10-CM) - Cervicalgia  Treatment Diagnosis: M54.2 (ICD-10-CM) - Cervicalgia  Insurance/Certification information:  PT Insurance Information: [facility]  Physician Information:    Yvette Musa  Has the plan of care been signed (Y/N):        [x]  Yes  []  No     Date of Patient follow up with Physician:       Is this a Progress Report:     []  Yes  [x]  No        If Yes:  Date Range for reporting period:  Beginning   Ending     Progress report will be due (10 Rx or 30 days whichever is less):       Recertification will be due (POC Duration  / 90 days whichever is less):         Visit # Insurance Allowable Auth Required   14 30/yr []  Yes [x]  No        Functional Scale:  NDI = 18% disability   Date assessed:                                   NDI 50 = 52% disability                                                         3/23                                  NDI = 44% disability                                                               Latex Allergy:  [x]NO      []YES  Preferred Language for Healthcare:   [x]English       []other:      Pain level:    0/10     SUBJECTIVE:    Patient states that her neck hasn't been as bad being on the medication     OBJECTIVE:     Marked TTP L UT  Observation:   Test measurements:      RESTRICTIONS/PRECAUTIONS:   Exercises/Interventions:   Exercise/Equipment Resistance/Repetitions Other comments   Stretching/PROM     CROM X 10 each way    Chin tuck 2 x 10 x 5\"    Chin tuck pull apart 3 x 10

## 2023-07-18 ENCOUNTER — HOSPITAL ENCOUNTER (OUTPATIENT)
Dept: PHYSICAL THERAPY | Age: 63
Setting detail: THERAPIES SERIES
Discharge: HOME | End: 2023-07-18
Payer: COMMERCIAL

## 2023-07-18 PROCEDURE — 97140 MANUAL THERAPY 1/> REGIONS: CPT

## 2023-07-18 PROCEDURE — 97112 NEUROMUSCULAR REEDUCATION: CPT

## 2023-07-18 PROCEDURE — 97110 THERAPEUTIC EXERCISES: CPT

## 2023-07-18 NOTE — FLOWSHEET NOTE
The [address] [facility], [facility] [address], 315 [facility], [address] [facility]  Phone: [phone]   Fax:     [phone]                                                      Physical Therapy Daily Treatment Note     Date:  2023    Patient Name:  Barbie Bridges    :  1960  MRN: [id number]  Restrictions/Precautions:    Medical/Treatment Diagnosis Information:  Diagnosis: M54.2 (ICD-10-CM) - Cervicalgia  Treatment Diagnosis: M54.2 (ICD-10-CM) - Cervicalgia  Insurance/Certification information:  PT Insurance Information: [facility]  Physician Information:    Delma Hanson  Has the plan of care been signed (Y/N):        [x]  Yes  []  No     Date of Patient follow up with Physician:       Is this a Progress Report:     []  Yes  [x]  No        If Yes:  Date Range for reporting period:  Beginning   Ending     Progress report will be due (10 Rx or 30 days whichever is less):       Recertification will be due (POC Duration  / 90 days whichever is less):         Visit # Insurance Allowable Auth Required   15 30/yr []  Yes [x]  No        Functional Scale:  NDI = 18% disability   Date assessed:                                   NDI  = 52% disability                                                         3/23                                  NDI = 44% disability                                                               Latex Allergy:  [x]NO      []YES  Preferred Language for Healthcare:   [x]English       []other:      Pain level:    0/10     SUBJECTIVE:    Patient states that she isnt sore from yesterday and is feeling ok today    OBJECTIVE:     C/S Rot symmetrical  Observation:   Test measurements:      RESTRICTIONS/PRECAUTIONS:   Exercises/Interventions:   Exercise/Equipment Resistance/Repetitions Other comments   Stretching/PROM     CROM X 10 each way    Chin tuck 2 x 10 x 5\"    Chin tuck pull apart 3

## 2023-07-25 ENCOUNTER — HOSPITAL ENCOUNTER (OUTPATIENT)
Dept: PHYSICAL THERAPY | Age: 63
Setting detail: THERAPIES SERIES
Discharge: HOME | End: 2023-07-25
Payer: COMMERCIAL

## 2023-07-25 PROCEDURE — 97140 MANUAL THERAPY 1/> REGIONS: CPT

## 2023-07-25 PROCEDURE — 97110 THERAPEUTIC EXERCISES: CPT

## 2023-07-25 NOTE — FLOWSHEET NOTE
complications  [x] Other: Since last re  evaluation, patient has shown improvement in cervical spine ROM, improvements in strength and endurance improving participation in ADL's as well as work related and recreational activities. Cont PT with transition to [facility]                  Patient Education: Patient educated on diagnosis, prognosis,POC, HEP    Access Code: [email or web address]      PLAN: See eval  [x] Continue per plan of care [] Alter current plan (see comments above)  [] Plan of care initiated [] Hold pending MD visit [] Discharge      Electronically signed by:  Carlotta Danielson, PT, DPT, CSCS    Note: If patient does not return for scheduled/ recommended follow up visits, this note will serve as a discharge from care along with most recent update on progress.

## 2023-08-06 ENCOUNTER — OFFICE VISIT (OUTPATIENT)
Dept: URGENT CARE | Age: 63
End: 2023-08-06

## 2023-08-06 VITALS
HEIGHT: 64 IN | WEIGHT: 193 LBS | OXYGEN SATURATION: 94 % | TEMPERATURE: 98.1 F | HEART RATE: 78 BPM | DIASTOLIC BLOOD PRESSURE: 90 MMHG | SYSTOLIC BLOOD PRESSURE: 153 MMHG | RESPIRATION RATE: 17 BRPM | BODY MASS INDEX: 32.95 KG/M2

## 2023-08-06 DIAGNOSIS — I10 BENIGN ESSENTIAL HTN: ICD-10-CM

## 2023-08-06 DIAGNOSIS — J01.90 ACUTE BACTERIAL SINUSITIS: Primary | ICD-10-CM

## 2023-08-06 DIAGNOSIS — B96.89 ACUTE BACTERIAL SINUSITIS: Primary | ICD-10-CM

## 2023-08-06 RX ORDER — BENZONATATE 100 MG/1
100 CAPSULE ORAL 3 TIMES DAILY PRN
Qty: 30 CAPSULE | Refills: 0 | Status: SHIPPED | OUTPATIENT
Start: 2023-08-06 | End: 2023-08-16

## 2023-08-06 RX ORDER — DOXYCYCLINE HYCLATE 100 MG
100 TABLET ORAL 2 TIMES DAILY
Qty: 14 TABLET | Refills: 0 | Status: SHIPPED | OUTPATIENT
Start: 2023-08-06 | End: 2023-08-13

## 2023-08-06 ASSESSMENT — ENCOUNTER SYMPTOMS
COUGH: 1
SINUS PRESSURE: 1
SINUS PAIN: 1
NAUSEA: 0
DIARRHEA: 0
VOMITING: 0
ABDOMINAL PAIN: 0

## 2023-08-08 DIAGNOSIS — Z78.0 MENOPAUSE: ICD-10-CM

## 2023-08-08 DIAGNOSIS — C73 THYROID CANCER (HCC): ICD-10-CM

## 2023-08-08 DIAGNOSIS — E89.0 HYPOTHYROIDISM, POSTSURGICAL: ICD-10-CM

## 2023-08-08 RX ORDER — LEVOTHYROXINE SODIUM 175 UG/1
175 TABLET ORAL EVERY OTHER DAY
Qty: 45 TABLET | Refills: 0 | Status: SHIPPED | OUTPATIENT
Start: 2023-08-08 | End: 2023-09-26 | Stop reason: SDUPTHER

## 2023-08-22 ENCOUNTER — OFFICE VISIT (OUTPATIENT)
Dept: URGENT CARE | Age: 63
End: 2023-08-22

## 2023-08-22 VITALS
BODY MASS INDEX: 32.95 KG/M2 | HEART RATE: 79 BPM | RESPIRATION RATE: 14 BRPM | WEIGHT: 193 LBS | OXYGEN SATURATION: 97 % | DIASTOLIC BLOOD PRESSURE: 87 MMHG | HEIGHT: 64 IN | SYSTOLIC BLOOD PRESSURE: 137 MMHG | TEMPERATURE: 98.2 F

## 2023-08-22 DIAGNOSIS — R42 VERTIGO: Primary | ICD-10-CM

## 2023-08-22 RX ORDER — MELOXICAM 15 MG/1
15 TABLET ORAL DAILY
COMMUNITY

## 2023-08-22 RX ORDER — TIZANIDINE 4 MG/1
4 TABLET ORAL DAILY
COMMUNITY
Start: 2023-07-10

## 2023-08-22 ASSESSMENT — ENCOUNTER SYMPTOMS
COUGH: 0
SORE THROAT: 0
FACIAL SWELLING: 1
SINUS PRESSURE: 1
RHINORRHEA: 0

## 2023-08-22 NOTE — PROGRESS NOTES
canal and external ear normal.      Left Ear: Tympanic membrane, ear canal and external ear normal.      Nose: No congestion or rhinorrhea. Right Sinus: No maxillary sinus tenderness or frontal sinus tenderness. Left Sinus: Maxillary sinus tenderness (No swelling or crepitus) present. No frontal sinus tenderness. Mouth/Throat:      Lips: Pink. Mouth: Mucous membranes are moist.      Pharynx: Oropharynx is clear. No pharyngeal swelling or posterior oropharyngeal erythema. Comments: Tonsils surgically absent   Eyes:      General: Lids are normal.      Extraocular Movements: Extraocular movements intact. Left eye: Nystagmus (Left) present. Conjunctiva/sclera: Conjunctivae normal.      Pupils: Pupils are unequal.      Right eye: Pupil is sluggish. Left eye: Pupil is not sluggish. Comments: Reports pupils at baseline  Left 2-3  Right 3-4   Cardiovascular:      Rate and Rhythm: Normal rate and regular rhythm. Pulses: Normal pulses. Heart sounds: Normal heart sounds. No murmur heard. No friction rub. No gallop. Pulmonary:      Effort: Pulmonary effort is normal.      Breath sounds: Normal breath sounds. Skin:     General: Skin is warm and dry. Neurological:      Mental Status: She is alert and oriented to person, place, and time. Gait: Gait abnormal (stumbling, using walls for assist). An electronic signature was used to authenticate this note.     --HAYES Carr - CNP

## 2023-08-22 NOTE — PATIENT INSTRUCTIONS
Antivert as needed  Try [place] exercises  Get plenty of rest, drink lots of fluid   Call ENT to get in sooner

## 2023-08-25 NOTE — PROGRESS NOTES
loss  Speech Recognition Threshold: 15 dB HL  Word Recognition: Excellent 100%, based on NU-6 25-word list at 55 dBHL using recorded speech stimuli. Tympanometry: Normal peak pressure and compliance, Type A tympanogram, consistent with normal middle ear function. LEFT EAR:  Hearing Sensitivity: Normal sloping to Mild Sensorineural hearing loss  Speech Recognition Threshold: 20 dB HL  Word Recognition: Excellent 100%, based on NU-6 25-word list at 60 dBHL using recorded speech stimuli. Tympanometry: Normal peak pressure and compliance, Type A tympanogram, consistent with normal middle ear function. Reliability: Good   Transducer: HF Headphones    See scanned audiogram dated 8/30/2023 for results. PATIENT EDUCATION:       The following items were discussed with the patient:   - Good Communication Strategies  - Hearing Loss and Hearing Aids  - Noise-Induced Hearing Loss and use of Hearing Protection Devices (HPDs)   - Dizziness    Educational information was shared in the After Visit Summary. RECOMMENDATIONS:                                                                                                                                                                                                                                                            The following items are recommended based on patient report and results from today's appointment:   - Continue medical follow-up with Roney Rodriguez DO.   - Retest hearing as medically indicated and/or sooner if a change in hearing is noted. - If desired, schedule a Hearing Aid Evaluation (HAE) appointment to discuss hearing aid options. - Utilize \"Good Communication Strategies\" as discussed to assist in speech understanding with communication partners. - Use hearing protection devices (HPDs), such as protective ear muffs and ear plugs, when exposed to dangerous sound levels.   - If medically

## 2023-08-30 ENCOUNTER — OFFICE VISIT (OUTPATIENT)
Dept: ENT CLINIC | Age: 63
End: 2023-08-30
Payer: COMMERCIAL

## 2023-08-30 ENCOUNTER — PROCEDURE VISIT (OUTPATIENT)
Dept: AUDIOLOGY | Age: 63
End: 2023-08-30
Payer: COMMERCIAL

## 2023-08-30 VITALS
WEIGHT: 197.4 LBS | HEIGHT: 64 IN | SYSTOLIC BLOOD PRESSURE: 155 MMHG | HEART RATE: 73 BPM | BODY MASS INDEX: 33.7 KG/M2 | DIASTOLIC BLOOD PRESSURE: 92 MMHG | TEMPERATURE: 97.8 F

## 2023-08-30 DIAGNOSIS — H81.12 BPPV (BENIGN PAROXYSMAL POSITIONAL VERTIGO), LEFT: ICD-10-CM

## 2023-08-30 DIAGNOSIS — J32.9 CHRONIC SINUSITIS, UNSPECIFIED LOCATION: ICD-10-CM

## 2023-08-30 DIAGNOSIS — H90.3 SENSORINEURAL HEARING LOSS (SNHL) OF BOTH EARS: Primary | ICD-10-CM

## 2023-08-30 DIAGNOSIS — H90.A22 SENSORINEURAL HEARING LOSS (SNHL) OF LEFT EAR WITH RESTRICTED HEARING OF RIGHT EAR: ICD-10-CM

## 2023-08-30 DIAGNOSIS — R42 DIZZINESS: Primary | ICD-10-CM

## 2023-08-30 DIAGNOSIS — R42 DIZZINESS: ICD-10-CM

## 2023-08-30 DIAGNOSIS — J30.9 ALLERGIC RHINITIS, UNSPECIFIED SEASONALITY, UNSPECIFIED TRIGGER: ICD-10-CM

## 2023-08-30 PROCEDURE — 92557 COMPREHENSIVE HEARING TEST: CPT

## 2023-08-30 PROCEDURE — 92567 TYMPANOMETRY: CPT

## 2023-08-30 PROCEDURE — 92504 EAR MICROSCOPY EXAMINATION: CPT | Performed by: OTOLARYNGOLOGY

## 2023-08-30 PROCEDURE — 99204 OFFICE O/P NEW MOD 45 MIN: CPT | Performed by: OTOLARYNGOLOGY

## 2023-08-30 PROCEDURE — 3077F SYST BP >= 140 MM HG: CPT | Performed by: OTOLARYNGOLOGY

## 2023-08-30 PROCEDURE — 3080F DIAST BP >= 90 MM HG: CPT | Performed by: OTOLARYNGOLOGY

## 2023-08-30 RX ORDER — FLUTICASONE PROPIONATE 50 MCG
2 SPRAY, SUSPENSION (ML) NASAL DAILY
Qty: 3 EACH | Refills: 1 | Status: SHIPPED | OUTPATIENT
Start: 2023-08-30 | End: 2023-09-29

## 2023-08-30 RX ORDER — AZELASTINE 1 MG/ML
1 SPRAY, METERED NASAL 2 TIMES DAILY
Qty: 3 EACH | Refills: 1 | Status: SHIPPED | OUTPATIENT
Start: 2023-08-30

## 2023-08-30 ASSESSMENT — ENCOUNTER SYMPTOMS
BLOOD IN STOOL: 0
COLOR CHANGE: 0
VOMITING: 0
DIARRHEA: 0
CHOKING: 0
VOICE CHANGE: 0
PHOTOPHOBIA: 0
SINUS PAIN: 0
RHINORRHEA: 1
CONSTIPATION: 0
EYE DISCHARGE: 0
COUGH: 0
FACIAL SWELLING: 0
SORE THROAT: 0
SHORTNESS OF BREATH: 0
NAUSEA: 0
SINUS PRESSURE: 1
EYE ITCHING: 0
STRIDOR: 0
TROUBLE SWALLOWING: 0
WHEEZING: 0
BACK PAIN: 0

## 2023-08-30 NOTE — PATIENT INSTRUCTIONS
Magnesium 400mg 1-2 per day (may cause diarrhea)  Vitamin B2 400mg daily (may cause neon colored urine)  Co-q 10 100mg three times a day    [email or web address]

## 2023-08-30 NOTE — PATIENT INSTRUCTIONS
better. If there is no clear reason for your symptoms, your doctor may suggest watching and waiting for a while to see if the dizziness goes away on its own. Follow-up care is a key part of your treatment and safety. Be sure to make and go to all appointments, and call your doctor if you are having problems. It's also a good idea to know your test results and keep a list of the medicines you take. How can you care for yourself at home? If your doctor recommends or prescribes medicine, take it exactly as directed. Call your doctor if you think you are having a problem with your medicine. Do not drive while you feel dizzy. Try to prevent falls. Steps you can take include:  Using nonskid mats, adding grab bars near the tub, and using night-lights. Clearing your home so that walkways are free of anything you might trip on. Letting family and friends know that you have been feeling dizzy. This will help them know how to help you. When should you call for help? Call 911 anytime you think you may need emergency care. For example, call if:    You passed out (lost consciousness). You have dizziness along with symptoms of a heart attack. These may include:  Chest pain or pressure, or a strange feeling in the chest.  Sweating. Shortness of breath. Nausea or vomiting. Pain, pressure, or a strange feeling in the back, neck, jaw, or upper belly or in one or both shoulders or arms. Lightheadedness or sudden weakness. A fast or irregular heartbeat. You have symptoms of a stroke. These may include:  Sudden numbness, tingling, weakness, or loss of movement in your face, arm, or leg, especially on only one side of your body. Sudden vision changes. Sudden trouble speaking. Sudden confusion or trouble understanding simple statements. Sudden problems with walking or balance. A sudden, severe headache that is different from past headaches.     Call your doctor now or seek immediate medical care if:

## 2023-08-30 NOTE — PROGRESS NOTES
vestibular rehab; Future    4. Allergic rhinitis, unspecified seasonality, unspecified trigger    - fluticasone (FLONASE) 50 MCG/ACT nasal spray; 2 sprays by Nasal route daily  Dispense: 3 each; Refill: 1  - azelastine (ASTELIN) 0.1 % nasal spray; 1 spray by Nasal route 2 times daily Use in each nostril as directed  Dispense: 3 each; Refill: 1    5. Chronic sinusitis, unspecified location    - fluticasone (FLONASE) 50 MCG/ACT nasal spray; 2 sprays by Nasal route daily  Dispense: 3 each; Refill: 1  - azelastine (ASTELIN) 0.1 % nasal spray; 1 spray by Nasal route 2 times daily Use in each nostril as directed  Dispense: 3 each; Refill: 1      Return in about 3 months (around 11/30/2023). [ ] Review/order radiology tests   [ ] Independent interpretation of diagnostic test by another provider  [ ] Discussed case with another provider  [ ] High risk of loss of major body function  [ ] Elective major surgery with risk factors    Portions of this note were dictated using Dragon.  There may be linguistic errors secondary to the use of this program.

## 2023-08-31 DIAGNOSIS — H91.90 HEARING LOSS, UNSPECIFIED HEARING LOSS TYPE, UNSPECIFIED LATERALITY: Primary | ICD-10-CM

## 2023-09-06 ENCOUNTER — TELEPHONE (OUTPATIENT)
Dept: ENT CLINIC | Age: 63
End: 2023-09-06

## 2023-09-06 DIAGNOSIS — J30.9 ALLERGIC RHINITIS, UNSPECIFIED SEASONALITY, UNSPECIFIED TRIGGER: Primary | ICD-10-CM

## 2023-09-06 PROCEDURE — 95024 IQ TESTS W/ALLERGENIC XTRCS: CPT | Performed by: OTOLARYNGOLOGY

## 2023-09-06 PROCEDURE — 95028 IQ TSTS ALLERGY DELAYED RXN: CPT | Performed by: OTOLARYNGOLOGY

## 2023-09-06 NOTE — TELEPHONE ENCOUNTER
Patient called into office and states that she is still experiencing a lot of sinus pressure and ear fullness related to her allergies. She stated that you had discussed allergy testing with her at her office visit on 8/30/23 and she would like to have orders placed for allergy testing as soon as possible.

## 2023-09-14 ENCOUNTER — HOSPITAL ENCOUNTER (OUTPATIENT)
Dept: ULTRASOUND IMAGING | Age: 63
Discharge: HOME | End: 2023-09-14
Payer: COMMERCIAL

## 2023-09-14 ENCOUNTER — OFFICE VISIT (OUTPATIENT)
Dept: ENT CLINIC | Age: 63
End: 2023-09-14
Payer: COMMERCIAL

## 2023-09-14 VITALS
HEIGHT: 64 IN | SYSTOLIC BLOOD PRESSURE: 162 MMHG | TEMPERATURE: 97.3 F | DIASTOLIC BLOOD PRESSURE: 99 MMHG | BODY MASS INDEX: 33.53 KG/M2 | HEART RATE: 78 BPM | WEIGHT: 196.4 LBS

## 2023-09-14 DIAGNOSIS — R42 DIZZINESS: Primary | ICD-10-CM

## 2023-09-14 DIAGNOSIS — C73 THYROID CANCER (HCC): ICD-10-CM

## 2023-09-14 DIAGNOSIS — J32.9 CHRONIC SINUSITIS, UNSPECIFIED LOCATION: ICD-10-CM

## 2023-09-14 DIAGNOSIS — J30.9 ALLERGIC RHINITIS, UNSPECIFIED SEASONALITY, UNSPECIFIED TRIGGER: ICD-10-CM

## 2023-09-14 DIAGNOSIS — R47.01 EXPRESSIVE APHASIA: ICD-10-CM

## 2023-09-14 PROCEDURE — 99214 OFFICE O/P EST MOD 30 MIN: CPT | Performed by: OTOLARYNGOLOGY

## 2023-09-14 PROCEDURE — 3080F DIAST BP >= 90 MM HG: CPT | Performed by: OTOLARYNGOLOGY

## 2023-09-14 PROCEDURE — 31231 NASAL ENDOSCOPY DX: CPT | Performed by: OTOLARYNGOLOGY

## 2023-09-14 PROCEDURE — 3077F SYST BP >= 140 MM HG: CPT | Performed by: OTOLARYNGOLOGY

## 2023-09-14 PROCEDURE — 76536 US EXAM OF HEAD AND NECK: CPT

## 2023-09-14 RX ORDER — GABAPENTIN 300 MG/1
300 CAPSULE ORAL 3 TIMES DAILY
COMMUNITY

## 2023-09-14 ASSESSMENT — ENCOUNTER SYMPTOMS
SINUS PRESSURE: 1
DIARRHEA: 0
SHORTNESS OF BREATH: 0
SORE THROAT: 0
COLOR CHANGE: 0
COUGH: 0
FACIAL SWELLING: 0
EYE REDNESS: 0
TROUBLE SWALLOWING: 0
PHOTOPHOBIA: 0
CHOKING: 0
VOICE CHANGE: 0
STRIDOR: 0
SINUS PAIN: 0
EYE PAIN: 0
NAUSEA: 0
RHINORRHEA: 1
EYE ITCHING: 0

## 2023-09-14 NOTE — PROGRESS NOTES
[facility]  [address] [facility], [address] [place] [address]'[facility],[address], [address]  P: [phone]  F: [phone]       Patient     Maylin Sanchez  1960    ChiefComplaint     Chief Complaint   Patient presents with    Dizziness     She feels like she is foggy. Nasal Congestion     She is having some pressure, runny nose. She is having very slight improvement with medicine. History of Present Illness     Andria Parker is a pleasant 59 y.o. female here for follow-up of vertigo, allergic rhinitis. Has been experiencing significantly worsening brain fog. She is having trouble finding simple words, focusing on driving and concentrating. It she is experiencing occasional headaches. She has been using the antihistamines as instructed. She does not feel that symptoms have worsened from them. She notices minimal improvement of sinonasal symptoms. Continues to experience sinonasal pressure, congestion, postnasal drainage, rhinorrhea. Her allergy nurse is going to reach out to her in the next couple weeks to schedule testing.     Past Medical History     Past Medical History:   Diagnosis Date    Asthma     Carpal tunnel syndrome     Dental disease     Dizziness     Ganglion cyst     GERD (gastroesophageal reflux disease)     Headache     Hypertension     PONV (postoperative nausea and vomiting)     Prediabetes 04/01/2022    Sinusitis     Thyroid cancer ([address]) 06/07/2021    Tonic pupil of right eye        Past Surgical History     Past Surgical History:   Procedure Laterality Date    BREAST LUMPECTOMY      BUNIONECTOMY Bilateral     CARPAL TUNNEL RELEASE      CHOLECYSTECTOMY      COLONOSCOPY  11/02/2021    COLONOSCOPY POLYPECTOMY ABLATION performed by Roge Maldonado MD at [address]  11/02/2021    COLONOSCOPY W/ ENDOSCOPIC MUCOSAL RESECTION performed by Roge Maldonado MD at [address]  04/12/2022    COLONOSCOPY POLYPECTOMY SNARE/COLD BIOPSY performed

## 2023-09-16 ENCOUNTER — HOSPITAL ENCOUNTER (OUTPATIENT)
Dept: MRI IMAGING | Age: 63
Discharge: HOME | End: 2023-09-16
Attending: OTOLARYNGOLOGY
Payer: COMMERCIAL

## 2023-09-16 DIAGNOSIS — R42 DIZZINESS: ICD-10-CM

## 2023-09-16 DIAGNOSIS — R47.01 EXPRESSIVE APHASIA: ICD-10-CM

## 2023-09-16 PROCEDURE — 70553 MRI BRAIN STEM W/O & W/DYE: CPT

## 2023-09-16 PROCEDURE — 6360000004 HC RX CONTRAST MEDICATION: Performed by: OTOLARYNGOLOGY

## 2023-09-16 PROCEDURE — A9579 GAD-BASE MR CONTRAST NOS,1ML: HCPCS | Performed by: OTOLARYNGOLOGY

## 2023-09-16 RX ADMIN — GADOTERIDOL 18 ML: 279.3 INJECTION, SOLUTION INTRAVENOUS at 08:53

## 2023-09-17 ENCOUNTER — TELEPHONE (OUTPATIENT)
Dept: ENDOCRINOLOGY | Age: 63
End: 2023-09-17

## 2023-09-17 DIAGNOSIS — C73 THYROID CANCER (HCC): Primary | ICD-10-CM

## 2023-09-17 DIAGNOSIS — R59.1 LYMPHADENOPATHY: ICD-10-CM

## 2023-09-17 NOTE — TELEPHONE ENCOUNTER
Please inform patient that radiologist recommended to obtain CT of the neck to evaluate lymph nodes. I ordered CT with contrast.  She can do it now or we can discuss during appointment.

## 2023-09-21 ENCOUNTER — HOSPITAL ENCOUNTER (OUTPATIENT)
Dept: CT IMAGING | Age: 63
Discharge: HOME | End: 2023-09-21
Attending: OTOLARYNGOLOGY
Payer: COMMERCIAL

## 2023-09-21 ENCOUNTER — HOSPITAL ENCOUNTER (OUTPATIENT)
Dept: CT IMAGING | Age: 63
Discharge: HOME | End: 2023-09-21
Attending: INTERNAL MEDICINE
Payer: COMMERCIAL

## 2023-09-21 DIAGNOSIS — C73 THYROID CANCER (HCC): ICD-10-CM

## 2023-09-21 DIAGNOSIS — J32.9 CHRONIC SINUSITIS, UNSPECIFIED LOCATION: ICD-10-CM

## 2023-09-21 DIAGNOSIS — R59.1 LYMPHADENOPATHY: ICD-10-CM

## 2023-09-21 LAB
PERFORMED ON: NORMAL
POC CREATININE: 0.6 MG/DL (ref 0.6–1.2)
POC SAMPLE TYPE: NORMAL

## 2023-09-21 PROCEDURE — 6360000004 HC RX CONTRAST MEDICATION: Performed by: INTERNAL MEDICINE

## 2023-09-21 PROCEDURE — 70486 CT MAXILLOFACIAL W/O DYE: CPT

## 2023-09-21 PROCEDURE — 70498 CT ANGIOGRAPHY NECK: CPT

## 2023-09-21 PROCEDURE — 82565 ASSAY OF CREATININE: CPT

## 2023-09-21 RX ADMIN — IOPAMIDOL 75 ML: 755 INJECTION, SOLUTION INTRAVENOUS at 14:31

## 2023-09-26 ENCOUNTER — TELEPHONE (OUTPATIENT)
Dept: ENT CLINIC | Age: 63
End: 2023-09-26

## 2023-09-26 ENCOUNTER — OFFICE VISIT (OUTPATIENT)
Dept: ENDOCRINOLOGY | Age: 63
End: 2023-09-26
Payer: COMMERCIAL

## 2023-09-26 VITALS
HEART RATE: 73 BPM | OXYGEN SATURATION: 98 % | TEMPERATURE: 98 F | DIASTOLIC BLOOD PRESSURE: 67 MMHG | BODY MASS INDEX: 33.8 KG/M2 | WEIGHT: 198 LBS | HEIGHT: 64 IN | RESPIRATION RATE: 14 BRPM | SYSTOLIC BLOOD PRESSURE: 149 MMHG

## 2023-09-26 DIAGNOSIS — R47.01 EXPRESSIVE APHASIA: ICD-10-CM

## 2023-09-26 DIAGNOSIS — E89.0 HYPOTHYROIDISM, POSTSURGICAL: Primary | ICD-10-CM

## 2023-09-26 DIAGNOSIS — R79.89 ELEVATED LIVER FUNCTION TESTS: ICD-10-CM

## 2023-09-26 DIAGNOSIS — E66.9 CLASS 1 OBESITY WITH BODY MASS INDEX (BMI) OF 33.0 TO 33.9 IN ADULT, UNSPECIFIED OBESITY TYPE, UNSPECIFIED WHETHER SERIOUS COMORBIDITY PRESENT: ICD-10-CM

## 2023-09-26 DIAGNOSIS — Z78.0 MENOPAUSE: ICD-10-CM

## 2023-09-26 DIAGNOSIS — C73 THYROID CANCER (HCC): ICD-10-CM

## 2023-09-26 PROBLEM — R59.1 LYMPHADENOPATHY: Status: ACTIVE | Noted: 2023-09-26

## 2023-09-26 PROCEDURE — 99214 OFFICE O/P EST MOD 30 MIN: CPT | Performed by: INTERNAL MEDICINE

## 2023-09-26 PROCEDURE — 3078F DIAST BP <80 MM HG: CPT | Performed by: INTERNAL MEDICINE

## 2023-09-26 PROCEDURE — 3077F SYST BP >= 140 MM HG: CPT | Performed by: INTERNAL MEDICINE

## 2023-09-26 RX ORDER — LEVOTHYROXINE SODIUM 175 UG/1
175 TABLET ORAL DAILY
Qty: 90 TABLET | Refills: 1 | Status: SHIPPED | OUTPATIENT
Start: 2023-09-26

## 2023-09-26 RX ORDER — LEVOTHYROXINE SODIUM 0.15 MG/1
150 TABLET ORAL EVERY OTHER DAY
Qty: 45 TABLET | Refills: 1 | Status: CANCELLED | OUTPATIENT
Start: 2023-09-26

## 2023-09-26 NOTE — TELEPHONE ENCOUNTER
Asked by Dr. Imelda Fontaine to discuss allergy testing with this patient. Allergy Welcome Packet emailed to patient and contents reviewed over the phone. Patient will call to check insurance coverage. Pt verb understanding to stop taking all antihistamines 7 days prior to testing. Also instructed to stop Astelin spray 2 days before testing. Patient will call tomorrow to schedule.

## 2023-09-26 NOTE — PROGRESS NOTES
carcinoma (1 mm to black-inked margin)        Angioinvasion (vascular invasion):  Not identified        Lymphatic Invasion:  Not identified        Extrathyroidal Extension:  Not identified        Pathologic Staging (pTNM)    Primary Tumor (pT):  pT1:     Tumor size 2 cm or less, limited to thyroid    Regional Lymph Nodes (pN):  pN0:   No regional lymph node metastasis        Comments:    Given the prior diagnosis (see Clinical History, above), the current    carcinoma is not prospectively reviewed by another member of the pathology    department. TB/saida        Microscopic Description:    Microscopic examination was performed. All described special stains have    appropriate controls. Had total thyroidectomy  Works as a nurse, had to change providers at     History of obstructive symptoms: difficulty swallowing No, changes in voice/hoarseness Yes. History of radiation to patient's neck: No  Resent iodine exposure: No  Family history includes no thyroid abnormalities. Family history of thyroid cancer: No    3. Class 1 obesity with body mass index (BMI) of 33.0 to 33.9 in adult, unspecified obesity type, unspecified whether serious comorbidity present  Eats healthy, active    4. Menopause  Had hysterectomy at age 55. Ovaries left. 5.  Elevated liver function tests  No nausea or vomiting  Has with vertigo before. 6. Expressive aphasia  Patient had dizziness, expressive aphasia  Started in 3/2023 as a bad vertigo.   Saw Neurologist  Still has expressive aphasia  Dizziness resolved  Has history of hearing loss in the past, resolved          [phone] 9/14/2023      Narrative & Impression  EXAM: Ultrasound of the neck  Status post thyroidectomy     INDICATION: Thyroid bed assessment     COMPARISON: None     FINDINGS:     In zone 2 in the left neck lymph node with eccentric fat is identified however,  the cortex appears thickened measuring 3 mm     Similar node zone 5

## 2023-09-28 ENCOUNTER — TELEPHONE (OUTPATIENT)
Dept: ENT CLINIC | Age: 63
End: 2023-09-28

## 2023-10-05 ENCOUNTER — OFFICE VISIT (OUTPATIENT)
Dept: PRIMARY CARE CLINIC | Age: 63
End: 2023-10-05
Payer: COMMERCIAL

## 2023-10-05 VITALS
HEIGHT: 64 IN | HEART RATE: 75 BPM | TEMPERATURE: 98.2 F | RESPIRATION RATE: 18 BRPM | SYSTOLIC BLOOD PRESSURE: 122 MMHG | OXYGEN SATURATION: 99 % | WEIGHT: 196 LBS | BODY MASS INDEX: 33.46 KG/M2 | DIASTOLIC BLOOD PRESSURE: 86 MMHG

## 2023-10-05 DIAGNOSIS — I10 PRIMARY HYPERTENSION: Primary | ICD-10-CM

## 2023-10-05 DIAGNOSIS — F33.0 MILD EPISODE OF RECURRENT MAJOR DEPRESSIVE DISORDER (HCC): ICD-10-CM

## 2023-10-05 DIAGNOSIS — Z12.31 ENCOUNTER FOR SCREENING MAMMOGRAM FOR BREAST CANCER: ICD-10-CM

## 2023-10-05 DIAGNOSIS — K21.9 GASTROESOPHAGEAL REFLUX DISEASE WITHOUT ESOPHAGITIS: ICD-10-CM

## 2023-10-05 DIAGNOSIS — J45.30 MILD PERSISTENT ASTHMA WITHOUT COMPLICATION: ICD-10-CM

## 2023-10-05 DIAGNOSIS — E78.00 PURE HYPERCHOLESTEROLEMIA: ICD-10-CM

## 2023-10-05 DIAGNOSIS — R73.03 PREDIABETES: ICD-10-CM

## 2023-10-05 DIAGNOSIS — Z23 NEED FOR VACCINATION: ICD-10-CM

## 2023-10-05 PROCEDURE — 90471 IMMUNIZATION ADMIN: CPT | Performed by: FAMILY MEDICINE

## 2023-10-05 PROCEDURE — 3074F SYST BP LT 130 MM HG: CPT | Performed by: FAMILY MEDICINE

## 2023-10-05 PROCEDURE — 99214 OFFICE O/P EST MOD 30 MIN: CPT | Performed by: FAMILY MEDICINE

## 2023-10-05 PROCEDURE — 90677 PCV20 VACCINE IM: CPT | Performed by: FAMILY MEDICINE

## 2023-10-05 PROCEDURE — 3078F DIAST BP <80 MM HG: CPT | Performed by: FAMILY MEDICINE

## 2023-10-05 RX ORDER — FLUOXETINE HYDROCHLORIDE 40 MG/1
CAPSULE ORAL
Qty: 90 CAPSULE | Refills: 3 | Status: SHIPPED | OUTPATIENT
Start: 2023-10-05

## 2023-10-05 RX ORDER — LOSARTAN POTASSIUM 25 MG/1
25 TABLET ORAL DAILY
Qty: 90 TABLET | Refills: 3 | Status: SHIPPED | OUTPATIENT
Start: 2023-10-05

## 2023-10-05 RX ORDER — VITAMIN B COMPLEX
TABLET ORAL
COMMUNITY
Start: 2023-09-01

## 2023-10-05 RX ORDER — OMEPRAZOLE 40 MG/1
40 CAPSULE, DELAYED RELEASE ORAL DAILY
Qty: 90 CAPSULE | Refills: 3 | Status: SHIPPED | OUTPATIENT
Start: 2023-10-05

## 2023-10-05 RX ORDER — MONTELUKAST SODIUM 10 MG/1
TABLET ORAL
Qty: 90 TABLET | Refills: 3 | Status: SHIPPED | OUTPATIENT
Start: 2023-10-05

## 2023-10-05 ASSESSMENT — PATIENT HEALTH QUESTIONNAIRE - PHQ9
SUM OF ALL RESPONSES TO PHQ QUESTIONS 1-9: 0
SUM OF ALL RESPONSES TO PHQ QUESTIONS 1-9: 0
1. LITTLE INTEREST OR PLEASURE IN DOING THINGS: 0
SUM OF ALL RESPONSES TO PHQ QUESTIONS 1-9: 0
SUM OF ALL RESPONSES TO PHQ9 QUESTIONS 1 & 2: 0
1. LITTLE INTEREST OR PLEASURE IN DOING THINGS: 0
4. FEELING TIRED OR HAVING LITTLE ENERGY: 0
5. POOR APPETITE OR OVEREATING: 0
2. FEELING DOWN, DEPRESSED OR HOPELESS: 0
SUM OF ALL RESPONSES TO PHQ9 QUESTIONS 1 & 2: 0
6. FEELING BAD ABOUT YOURSELF - OR THAT YOU ARE A FAILURE OR HAVE LET YOURSELF OR YOUR FAMILY DOWN: 0
SUM OF ALL RESPONSES TO PHQ QUESTIONS 1-9: 0
SUM OF ALL RESPONSES TO PHQ QUESTIONS 1-9: 0
9. THOUGHTS THAT YOU WOULD BE BETTER OFF DEAD, OR OF HURTING YOURSELF: 0
3. TROUBLE FALLING OR STAYING ASLEEP: 0
SUM OF ALL RESPONSES TO PHQ QUESTIONS 1-9: 0
SUM OF ALL RESPONSES TO PHQ QUESTIONS 1-9: 0
7. TROUBLE CONCENTRATING ON THINGS, SUCH AS READING THE NEWSPAPER OR WATCHING TELEVISION: 0
8. MOVING OR SPEAKING SO SLOWLY THAT OTHER PEOPLE COULD HAVE NOTICED. OR THE OPPOSITE, BEING SO FIGETY OR RESTLESS THAT YOU HAVE BEEN MOVING AROUND A LOT MORE THAN USUAL: 0
2. FEELING DOWN, DEPRESSED OR HOPELESS: 0
SUM OF ALL RESPONSES TO PHQ QUESTIONS 1-9: 0

## 2023-10-06 LAB
ALBUMIN SERPL-MCNC: 5.1 G/DL (ref 3.4–5)
ALBUMIN/GLOB SERPL: 2.3 {RATIO} (ref 1.1–2.2)
ALP SERPL-CCNC: 95 U/L (ref 40–129)
ALT SERPL-CCNC: 30 U/L (ref 10–40)
ANION GAP SERPL CALCULATED.3IONS-SCNC: 14 MMOL/L (ref 3–16)
AST SERPL-CCNC: 31 U/L (ref 15–37)
BILIRUB SERPL-MCNC: 0.7 MG/DL (ref 0–1)
BUN SERPL-MCNC: 14 MG/DL (ref 7–20)
CALCIUM SERPL-MCNC: 9.3 MG/DL (ref 8.3–10.6)
CHLORIDE SERPL-SCNC: 98 MMOL/L (ref 99–110)
CHOLEST SERPL-MCNC: 280 MG/DL (ref 0–199)
CO2 SERPL-SCNC: 26 MMOL/L (ref 21–32)
CREAT SERPL-MCNC: 0.6 MG/DL (ref 0.6–1.2)
DEPRECATED RDW RBC AUTO: 13.1 % (ref 12.4–15.4)
EST. AVERAGE GLUCOSE BLD GHB EST-MCNC: 114 MG/DL
GFR SERPLBLD CREATININE-BSD FMLA CKD-EPI: >60 ML/MIN/{1.73_M2}
GLUCOSE SERPL-MCNC: 112 MG/DL (ref 70–99)
HBA1C MFR BLD: 5.6 %
HCT VFR BLD AUTO: 43.5 % (ref 36–48)
HDLC SERPL-MCNC: 92 MG/DL (ref 40–60)
HGB BLD-MCNC: 14.6 G/DL (ref 12–16)
LDLC SERPL CALC-MCNC: 163 MG/DL
MCH RBC QN AUTO: 31.8 PG (ref 26–34)
MCHC RBC AUTO-ENTMCNC: 33.6 G/DL (ref 31–36)
MCV RBC AUTO: 94.7 FL (ref 80–100)
PLATELET # BLD AUTO: 298 K/UL (ref 135–450)
PMV BLD AUTO: 9 FL (ref 5–10.5)
POTASSIUM SERPL-SCNC: 4.4 MMOL/L (ref 3.5–5.1)
PROT SERPL-MCNC: 7.3 G/DL (ref 6.4–8.2)
RBC # BLD AUTO: 4.59 M/UL (ref 4–5.2)
SODIUM SERPL-SCNC: 138 MMOL/L (ref 136–145)
TRIGL SERPL-MCNC: 126 MG/DL (ref 0–150)
VLDLC SERPL CALC-MCNC: 25 MG/DL
WBC # BLD AUTO: 8.9 K/UL (ref 4–11)

## 2023-10-17 ENCOUNTER — NURSE ONLY (OUTPATIENT)
Dept: ENT CLINIC | Age: 63
End: 2023-10-17
Payer: COMMERCIAL

## 2023-10-17 VITALS — SYSTOLIC BLOOD PRESSURE: 128 MMHG | DIASTOLIC BLOOD PRESSURE: 72 MMHG | HEART RATE: 64 BPM | TEMPERATURE: 97.7 F

## 2023-10-17 PROCEDURE — 95004 PERQ TESTS W/ALRGNC XTRCS: CPT | Performed by: OTOLARYNGOLOGY

## 2023-10-17 PROCEDURE — 95024 IQ TESTS W/ALLERGENIC XTRCS: CPT | Performed by: OTOLARYNGOLOGY

## 2023-10-17 NOTE — PROGRESS NOTES
Allergy Testing Note      After obtaining consent, and per orders of Dr Makenna Engle, injections of allergy serum given per documentation below by ANA Muller RN. Test Information  Consent: Yes  Time Antigens Placed: 3233  Location: Arm (left)  Allergen : ALK David  Testing Nurse: ANA Muller RN  Reviewing Physician: Makenna Engle  Amount Injected (mL): 0.01    Controls  Negative 0.05% Glycerine-Saline: Not tested  Positive Histamine 1 mg/ml: Not tested    Laine Hollis: Not tested  Paper Johanna Tommie: Not tested  [place]: 0  Red Cavalier: 3+  American Elm: 3+  Desha: Not tested  Link Victoria: Not tested  Sugar Maple: Not tested  [employer] [place]: 0  Bur [place]: Not tested  HappyBox: Not tested  Sealed Air Corporation: Not tested  Stevia First: 3+  [place]: Not tested  Link Tan: Not tested  ToysRus: 0  [employer] Birch: 0  Y[place]hill Eastern: 3+  Bartow/Pecan Mix: 3+  Red Maple: 0  White Oak: 3+    Others  [employer] Family Insurance Mite: 3+  Dog Dander: 3+  Cat Dander: 3+  Feather (Mixed): 0  Cockroach: 0    Grasses  Ky Bluegrass: Not tested  Smooth Brome: Not tested  Martinique: Not tested  Dermott's Pride Fescue: Not tested  Dermott's Pride Fescue: 0  Santos Sutton: Not tested  Coral Sulema: 0  Bermuda: 0  Juan A: 3+    Weeds  Cocklebur: Not tested  Reppify Quarter: 0  Burweed Common: Not tested  Mugwort: Not tested  English Plantain: 3+  Giant Ragweed: Not tested  Short Ragweed: Not tested  Sheep Sorrel: 3+  Kochia: 3+  Red Root Pigweed: 3+ (ROUGH PIGWEED EP 3)  Mixed Ragweed: 3+    Molds/Fungi  Alternaria Hormodendrum: 3+  Dreschlera: Not tested  Epicoccum Nigrum: 3+  Epidermorphyto Floccosum: Not tested  Fusarium Moniliforme: Not tested  Fusarium Solani: Not tested  Geotrichum Candidum: Not tested  Gliocladium: Not tested  Deliquescence: Not tested  Spondylocladium: Not tested  Atrovirens Microsporum: Not tested  Phoma Betae: Not tested  Rhizopus Oryzae: Not tested  Rhodotorula: 3+  Saccharomyces Cerevisiae : Not

## 2023-11-14 ENCOUNTER — OFFICE VISIT (OUTPATIENT)
Dept: PRIMARY CARE CLINIC | Age: 63
End: 2023-11-14
Payer: COMMERCIAL

## 2023-11-14 VITALS
HEART RATE: 86 BPM | BODY MASS INDEX: 32.95 KG/M2 | TEMPERATURE: 98.7 F | WEIGHT: 193 LBS | DIASTOLIC BLOOD PRESSURE: 84 MMHG | SYSTOLIC BLOOD PRESSURE: 152 MMHG | HEIGHT: 64 IN | OXYGEN SATURATION: 97 % | RESPIRATION RATE: 16 BRPM

## 2023-11-14 DIAGNOSIS — F33.0 MILD EPISODE OF RECURRENT MAJOR DEPRESSIVE DISORDER (HCC): ICD-10-CM

## 2023-11-14 DIAGNOSIS — F43.0 ACUTE STRESS REACTION: Primary | ICD-10-CM

## 2023-11-14 PROCEDURE — 99214 OFFICE O/P EST MOD 30 MIN: CPT | Performed by: FAMILY MEDICINE

## 2023-11-14 PROCEDURE — 3079F DIAST BP 80-89 MM HG: CPT | Performed by: FAMILY MEDICINE

## 2023-11-14 PROCEDURE — 3077F SYST BP >= 140 MM HG: CPT | Performed by: FAMILY MEDICINE

## 2023-11-14 RX ORDER — ALPRAZOLAM 0.5 MG/1
0.5 TABLET ORAL 3 TIMES DAILY PRN
Qty: 30 TABLET | Refills: 0 | Status: SHIPPED | OUTPATIENT
Start: 2023-11-14 | End: 2023-12-14

## 2023-11-14 RX ORDER — BUSPIRONE HYDROCHLORIDE 10 MG/1
10 TABLET ORAL 2 TIMES DAILY
Qty: 60 TABLET | Refills: 0 | Status: SHIPPED | OUTPATIENT
Start: 2023-11-14 | End: 2023-12-14

## 2023-11-14 ASSESSMENT — PATIENT HEALTH QUESTIONNAIRE - PHQ9
SUM OF ALL RESPONSES TO PHQ QUESTIONS 1-9: 0
3. TROUBLE FALLING OR STAYING ASLEEP: 0
8. MOVING OR SPEAKING SO SLOWLY THAT OTHER PEOPLE COULD HAVE NOTICED. OR THE OPPOSITE, BEING SO FIGETY OR RESTLESS THAT YOU HAVE BEEN MOVING AROUND A LOT MORE THAN USUAL: 0
4. FEELING TIRED OR HAVING LITTLE ENERGY: 0
SUM OF ALL RESPONSES TO PHQ QUESTIONS 1-9: 0
SUM OF ALL RESPONSES TO PHQ QUESTIONS 1-9: 0
6. FEELING BAD ABOUT YOURSELF - OR THAT YOU ARE A FAILURE OR HAVE LET YOURSELF OR YOUR FAMILY DOWN: 0
1. LITTLE INTEREST OR PLEASURE IN DOING THINGS: 0
SUM OF ALL RESPONSES TO PHQ QUESTIONS 1-9: 0
1. LITTLE INTEREST OR PLEASURE IN DOING THINGS: 0
2. FEELING DOWN, DEPRESSED OR HOPELESS: 0
SUM OF ALL RESPONSES TO PHQ9 QUESTIONS 1 & 2: 0
SUM OF ALL RESPONSES TO PHQ QUESTIONS 1-9: 0
SUM OF ALL RESPONSES TO PHQ QUESTIONS 1-9: 0
SUM OF ALL RESPONSES TO PHQ9 QUESTIONS 1 & 2: 0
9. THOUGHTS THAT YOU WOULD BE BETTER OFF DEAD, OR OF HURTING YOURSELF: 0
2. FEELING DOWN, DEPRESSED OR HOPELESS: 0
7. TROUBLE CONCENTRATING ON THINGS, SUCH AS READING THE NEWSPAPER OR WATCHING TELEVISION: 0
5. POOR APPETITE OR OVEREATING: 0

## 2023-11-14 ASSESSMENT — ANXIETY QUESTIONNAIRES
1. FEELING NERVOUS, ANXIOUS, OR ON EDGE: 3
4. TROUBLE RELAXING: 3
2. NOT BEING ABLE TO STOP OR CONTROL WORRYING: 3
GAD7 TOTAL SCORE: 18
5. BEING SO RESTLESS THAT IT IS HARD TO SIT STILL: 3
3. WORRYING TOO MUCH ABOUT DIFFERENT THINGS: 3
6. BECOMING EASILY ANNOYED OR IRRITABLE: 3
7. FEELING AFRAID AS IF SOMETHING AWFUL MIGHT HAPPEN: 0

## 2023-11-29 NOTE — PROGRESS NOTES
PROGRESS NOTE  Date of Service:  2023    SUBJECTIVE:  Patient ID: Gwluana Estrella is a 59 y.o. female    ASSESSMENT  1. Acute stress reaction    2. Insomnia due to other mental disorder    3. Macromastia        PLAN:   1. Acute stress reaction  -     busPIRone (BUSPAR) 10 MG tablet; Take 1 tablet by mouth 2 times daily, Disp-180 tablet, R-0Normal  2. Insomnia due to other mental disorder  -     traZODone (DESYREL) 50 MG tablet; Take 1 tablet by mouth nightly as needed for Sleep, Disp-30 tablet, R-2Normal  3. [facility], [place], [place], Plastic Surgery, [facility]  Continue buspar and wean off xanax  Recommend sleep hygiene measures and trial of medication short term       Return in about 6 weeks (around 2024). HPI:     History From last visit  Her son  this past weekend from an overdose  He had struggled since his years of service in the [employer]. He had an 25year old son  She has been in shock since she heard news  Feels constant tremor, headache, abdominal pain, myalgias, overwhelm, tearful  Unable to stop brain   Difficult to sleep  Depression- on fluoxetine 40 mg  No previous medication use for anxiety    Today she reports she has seen freq of episodes of overwhelm decrease  His  was very special with Taps being played,  She has been with his son, family   Interested in grief counseling  Will start back to work tomorrow  Having difficulty sleeping, waking with nightmares, thought moving    She reports continued cervicalgia, shoulder pain and finds her breasts add to the issue when she lifts breast in support she notes symptoms do improve  Interested in breast reduction to help with her cervicalgia        Patient's medications, allergies, past medical, surgical, social and family histories were reviewed and updated as appropriate.          OBJECTIVE:  Vitals:    23 1547 23 1554   BP: (!) 148/92 (!) 156/98   Site: Left Upper Arm Right

## 2023-11-30 ENCOUNTER — OFFICE VISIT (OUTPATIENT)
Dept: PRIMARY CARE CLINIC | Age: 63
End: 2023-11-30
Payer: COMMERCIAL

## 2023-11-30 VITALS
HEIGHT: 64 IN | BODY MASS INDEX: 33.97 KG/M2 | SYSTOLIC BLOOD PRESSURE: 156 MMHG | RESPIRATION RATE: 16 BRPM | TEMPERATURE: 98.6 F | OXYGEN SATURATION: 97 % | HEART RATE: 72 BPM | WEIGHT: 199 LBS | DIASTOLIC BLOOD PRESSURE: 98 MMHG

## 2023-11-30 DIAGNOSIS — N62 MACROMASTIA: ICD-10-CM

## 2023-11-30 DIAGNOSIS — F51.05 INSOMNIA DUE TO OTHER MENTAL DISORDER: ICD-10-CM

## 2023-11-30 DIAGNOSIS — F99 INSOMNIA DUE TO OTHER MENTAL DISORDER: ICD-10-CM

## 2023-11-30 DIAGNOSIS — F43.0 ACUTE STRESS REACTION: Primary | ICD-10-CM

## 2023-11-30 PROCEDURE — 3078F DIAST BP <80 MM HG: CPT | Performed by: FAMILY MEDICINE

## 2023-11-30 PROCEDURE — 99214 OFFICE O/P EST MOD 30 MIN: CPT | Performed by: FAMILY MEDICINE

## 2023-11-30 PROCEDURE — 3074F SYST BP LT 130 MM HG: CPT | Performed by: FAMILY MEDICINE

## 2023-11-30 RX ORDER — TRAZODONE HYDROCHLORIDE 50 MG/1
50 TABLET ORAL NIGHTLY PRN
Qty: 30 TABLET | Refills: 2 | Status: SHIPPED | OUTPATIENT
Start: 2023-11-30

## 2023-11-30 ASSESSMENT — PATIENT HEALTH QUESTIONNAIRE - PHQ9
9. THOUGHTS THAT YOU WOULD BE BETTER OFF DEAD, OR OF HURTING YOURSELF: 0
10. IF YOU CHECKED OFF ANY PROBLEMS, HOW DIFFICULT HAVE THESE PROBLEMS MADE IT FOR YOU TO DO YOUR WORK, TAKE CARE OF THINGS AT HOME, OR GET ALONG WITH OTHER PEOPLE: 2
7. TROUBLE CONCENTRATING ON THINGS, SUCH AS READING THE NEWSPAPER OR WATCHING TELEVISION: 2
8. MOVING OR SPEAKING SO SLOWLY THAT OTHER PEOPLE COULD HAVE NOTICED. OR THE OPPOSITE, BEING SO FIGETY OR RESTLESS THAT YOU HAVE BEEN MOVING AROUND A LOT MORE THAN USUAL: 3
2. FEELING DOWN, DEPRESSED OR HOPELESS: 2
SUM OF ALL RESPONSES TO PHQ QUESTIONS 1-9: 21
6. FEELING BAD ABOUT YOURSELF - OR THAT YOU ARE A FAILURE OR HAVE LET YOURSELF OR YOUR FAMILY DOWN: 2
SUM OF ALL RESPONSES TO PHQ QUESTIONS 1-9: 21
5. POOR APPETITE OR OVEREATING: 3
1. LITTLE INTEREST OR PLEASURE IN DOING THINGS: 3
SUM OF ALL RESPONSES TO PHQ9 QUESTIONS 1 & 2: 5
SUM OF ALL RESPONSES TO PHQ QUESTIONS 1-9: 21
SUM OF ALL RESPONSES TO PHQ QUESTIONS 1-9: 21
3. TROUBLE FALLING OR STAYING ASLEEP: 3
4. FEELING TIRED OR HAVING LITTLE ENERGY: 3

## 2023-12-03 DIAGNOSIS — F43.0 ACUTE STRESS REACTION: ICD-10-CM

## 2023-12-03 PROBLEM — F51.05 INSOMNIA DUE TO OTHER MENTAL DISORDER: Status: ACTIVE | Noted: 2023-12-03

## 2023-12-03 PROBLEM — F99 INSOMNIA DUE TO OTHER MENTAL DISORDER: Status: ACTIVE | Noted: 2023-12-03

## 2023-12-03 RX ORDER — BUSPIRONE HYDROCHLORIDE 10 MG/1
10 TABLET ORAL 2 TIMES DAILY
Qty: 180 TABLET | Refills: 0 | Status: CANCELLED | OUTPATIENT
Start: 2023-12-03 | End: 2024-03-02

## 2023-12-03 RX ORDER — BUSPIRONE HYDROCHLORIDE 10 MG/1
10 TABLET ORAL 2 TIMES DAILY
Qty: 180 TABLET | Refills: 0 | Status: SHIPPED | OUTPATIENT
Start: 2023-12-03 | End: 2024-03-02

## 2023-12-04 RX ORDER — ALPRAZOLAM 0.5 MG/1
TABLET ORAL
Qty: 30 TABLET | Refills: 0 | Status: SHIPPED | OUTPATIENT
Start: 2023-12-04 | End: 2024-02-04

## 2023-12-04 NOTE — TELEPHONE ENCOUNTER
Medication:   Requested Prescriptions     Pending Prescriptions Disp Refills    ALPRAZolam (XANAX) 0.5 MG tablet 30 tablet 0     Sig: Take 1 tablet by mouth 3 times daily as needed for Sleep or Anxiety for up to 30 doses. Max Daily Amount: 1.5 mg    busPIRone (BUSPAR) 10 MG tablet 180 tablet 0     Sig: Take 1 tablet by mouth 2 times daily        Last Filled:  alprazolam 0.5 mg 11/14/2023 # 30,0 refills    Patient Phone Number: [phone] (home)     Last appt: 11/30/2023   Next appt: Visit date not found    Last OARRS:        No data to display

## 2023-12-11 DIAGNOSIS — F43.0 ACUTE STRESS REACTION: ICD-10-CM

## 2023-12-11 RX ORDER — BUSPIRONE HYDROCHLORIDE 10 MG/1
10 TABLET ORAL 2 TIMES DAILY
Qty: 60 TABLET | OUTPATIENT
Start: 2023-12-11

## 2023-12-11 NOTE — TELEPHONE ENCOUNTER
Medication:   Requested Prescriptions     Pending Prescriptions Disp Refills    busPIRone (BUSPAR) 10 MG tablet [Pharmacy Med Name: BUSPIRONE HCL 10 MG TABLET] 60 tablet      Sig: TAKE 1 TABLET BY MOUTH TWICE A DAY        Last Filled:  12/3/2023 # 180 0 refills (Denied still has 60 tablets left)    Patient Phone Number: [phone] (home)     Last appt: 11/30/2023   Next appt: Visit date not found    Last OARRS:        No data to display

## 2023-12-15 ENCOUNTER — HOSPITAL ENCOUNTER (OUTPATIENT)
Dept: MAMMOGRAPHY | Age: 63
Discharge: HOME | End: 2023-12-15
Payer: COMMERCIAL

## 2023-12-15 VITALS — WEIGHT: 191 LBS | HEIGHT: 64 IN | BODY MASS INDEX: 32.61 KG/M2

## 2023-12-15 DIAGNOSIS — Z12.31 ENCOUNTER FOR SCREENING MAMMOGRAM FOR BREAST CANCER: ICD-10-CM

## 2023-12-15 PROCEDURE — 77063 BREAST TOMOSYNTHESIS BI: CPT

## 2023-12-27 ENCOUNTER — PATIENT MESSAGE (OUTPATIENT)
Dept: PRIMARY CARE CLINIC | Age: 63
End: 2023-12-27

## 2023-12-27 NOTE — TELEPHONE ENCOUNTER
From: Andria Mukherjee  To: Dr. Ban Berg  Sent: 12/27/2023 12:59 PM EST  Subject: Consults    Could your office send out a consult for me to:  Dr Abarca and Dr Garcia. Both are with [facility]. I would like to have their opinion also about my breast reduction. Thank you.   Andria Garrett  [phone]

## 2023-12-30 DIAGNOSIS — I10 PRIMARY HYPERTENSION: ICD-10-CM

## 2023-12-30 DIAGNOSIS — F43.0 ACUTE STRESS REACTION: ICD-10-CM

## 2023-12-30 DIAGNOSIS — J45.30 MILD PERSISTENT ASTHMA WITHOUT COMPLICATION: ICD-10-CM

## 2023-12-30 DIAGNOSIS — F33.0 MILD EPISODE OF RECURRENT MAJOR DEPRESSIVE DISORDER (HCC): ICD-10-CM

## 2024-01-02 RX ORDER — BUSPIRONE HYDROCHLORIDE 10 MG/1
10 TABLET ORAL 2 TIMES DAILY
Qty: 180 TABLET | Refills: 0 | OUTPATIENT
Start: 2024-01-02

## 2024-01-02 RX ORDER — AMLODIPINE BESYLATE 10 MG/1
TABLET ORAL
Qty: 90 TABLET | Refills: 1 | OUTPATIENT
Start: 2024-01-02

## 2024-01-02 RX ORDER — FLUOXETINE HYDROCHLORIDE 40 MG/1
CAPSULE ORAL
Qty: 90 CAPSULE | Refills: 1 | OUTPATIENT
Start: 2024-01-02

## 2024-01-02 RX ORDER — MONTELUKAST SODIUM 10 MG/1
TABLET ORAL
Qty: 90 TABLET | Refills: 1 | OUTPATIENT
Start: 2024-01-02

## 2024-01-02 NOTE — TELEPHONE ENCOUNTER
Medication:   Requested Prescriptions     Pending Prescriptions Disp Refills    amLODIPine (NORVASC) 10 MG tablet [Pharmacy Med Name: AMLODIPINE BESYLATE 10MG TABS] 90 tablet 1     Sig: TAKE ONE TABLET BY MOUTH ONE TIME A DAY    montelukast (SINGULAIR) 10 MG tablet [Pharmacy Med Name: MONTELUKAST SODIUM 10MG TABS] 90 tablet 1     Sig: TAKE ONE TABLET BY MOUTH EVERY NIGHT    busPIRone (BUSPAR) 10 MG tablet [Pharmacy Med Name: BUSPIRONE HCL 10MG TABS] 180 tablet 0     Sig: TAKE ONE TABLET BY MOUTH TWICE A DAY    FLUoxetine (PROZAC) 40 MG capsule [Pharmacy Med Name: FLUOXETINE HCL 40MG CAPS] 90 capsule 1     Sig: TAKE ONE CAPSULE BY MOUTH ONCE A DAY        Last Filled:  [id number]    Patient Phone Number: [phone] (home)     Last appt: 11/30/2023   Next appt: 01/12/2024    Last OARRS:        No data to display

## 2024-01-03 DIAGNOSIS — K21.9 GASTROESOPHAGEAL REFLUX DISEASE WITHOUT ESOPHAGITIS: ICD-10-CM

## 2024-01-03 RX ORDER — OMEPRAZOLE 40 MG/1
CAPSULE, DELAYED RELEASE ORAL
Qty: 90 CAPSULE | Refills: 1 | OUTPATIENT
Start: 2024-01-03

## 2024-01-03 NOTE — TELEPHONE ENCOUNTER
Order confirmation faxed to  for clarification.  No further action is needed for this encounter.

## 2024-01-03 NOTE — TELEPHONE ENCOUNTER
Medication:   Requested Prescriptions     Pending Prescriptions Disp Refills    omeprazole (PRILOSEC) 40 MG delayed release capsule [Pharmacy Med Name: OMEPRAZOLE 40MG CPDR] 90 capsule 1     Sig: TAKE ONE CAPSULE BY MOUTH ONE TIME A DAY     Last Filled:  10/5/2023    Last appt: 11/11/2021   Next appt: Visit date not found    Last OARRS:        No data to display

## 2024-01-03 NOTE — TELEPHONE ENCOUNTER
Medication:   Requested Prescriptions     Pending Prescriptions Disp Refills    omeprazole (PRILOSEC) 40 MG delayed release capsule [Pharmacy Med Name: OMEPRAZOLE 40MG CPDR] 90 capsule 1     Sig: TAKE ONE CAPSULE BY MOUTH ONE TIME A DAY        Last Filled:  [id number]    Patient Phone Number: [phone] (home)     Last appt: 11/11/2021   Next appt: Visit date not found    Last OARRS:        No data to display

## 2024-01-06 DIAGNOSIS — I10 PRIMARY HYPERTENSION: ICD-10-CM

## 2024-01-08 NOTE — TELEPHONE ENCOUNTER
Medication:   Requested Prescriptions     Pending Prescriptions Disp Refills    amLODIPine (NORVASC) 10 MG tablet [Pharmacy Med Name: AMLODIPINE BESYLATE 10MG TABS] 90 tablet 1     Sig: TAKE ONE TABLET BY MOUTH ONE TIME A DAY        Last Filled:  03.19.2023 #90 W/ 1 REFILL     Patient Phone Number: [phone] (home)     Last appt: 11/30/2023   Next appt: 1/12/2024    Last OARRS:        No data to display

## 2024-01-12 ENCOUNTER — OFFICE VISIT (OUTPATIENT)
Dept: PRIMARY CARE CLINIC | Age: 64
End: 2024-01-12
Payer: COMMERCIAL

## 2024-01-12 VITALS
TEMPERATURE: 97.8 F | HEIGHT: 64 IN | OXYGEN SATURATION: 92 % | SYSTOLIC BLOOD PRESSURE: 132 MMHG | WEIGHT: 195.8 LBS | HEART RATE: 62 BPM | DIASTOLIC BLOOD PRESSURE: 86 MMHG | BODY MASS INDEX: 33.43 KG/M2 | RESPIRATION RATE: 16 BRPM

## 2024-01-12 DIAGNOSIS — I10 PRIMARY HYPERTENSION: Primary | ICD-10-CM

## 2024-01-12 DIAGNOSIS — E78.00 PURE HYPERCHOLESTEROLEMIA: ICD-10-CM

## 2024-01-12 DIAGNOSIS — F33.0 MILD EPISODE OF RECURRENT MAJOR DEPRESSIVE DISORDER (HCC): ICD-10-CM

## 2024-01-12 DIAGNOSIS — N62 MACROMASTIA: ICD-10-CM

## 2024-01-12 DIAGNOSIS — C73 THYROID CANCER (HCC): ICD-10-CM

## 2024-01-12 DIAGNOSIS — R73.03 PREDIABETES: ICD-10-CM

## 2024-01-12 PROCEDURE — 99214 OFFICE O/P EST MOD 30 MIN: CPT | Performed by: FAMILY MEDICINE

## 2024-01-12 PROCEDURE — 3079F DIAST BP 80-89 MM HG: CPT | Performed by: FAMILY MEDICINE

## 2024-01-12 PROCEDURE — 3075F SYST BP GE 130 - 139MM HG: CPT | Performed by: FAMILY MEDICINE

## 2024-01-12 RX ORDER — AMLODIPINE BESYLATE AND BENAZEPRIL HYDROCHLORIDE 2.5; 1 MG/1; MG/1
1 CAPSULE ORAL DAILY
COMMUNITY
Start: 2022-02-28 | End: 2024-01-12

## 2024-01-12 RX ORDER — AMLODIPINE BESYLATE 10 MG/1
TABLET ORAL
Qty: 90 TABLET | Refills: 1 | Status: SHIPPED | OUTPATIENT
Start: 2024-01-12

## 2024-01-12 RX ORDER — LOSARTAN POTASSIUM 25 MG/1
25 TABLET ORAL DAILY
Qty: 90 TABLET | Refills: 1 | Status: SHIPPED | OUTPATIENT
Start: 2024-01-12

## 2024-01-12 RX ORDER — AMLODIPINE BESYLATE 10 MG/1
TABLET ORAL
Qty: 90 TABLET | Refills: 1 | OUTPATIENT
Start: 2024-01-12

## 2024-01-12 ASSESSMENT — PATIENT HEALTH QUESTIONNAIRE - PHQ9
1. LITTLE INTEREST OR PLEASURE IN DOING THINGS: 1
6. FEELING BAD ABOUT YOURSELF - OR THAT YOU ARE A FAILURE OR HAVE LET YOURSELF OR YOUR FAMILY DOWN: 1
SUM OF ALL RESPONSES TO PHQ QUESTIONS 1-9: 7
SUM OF ALL RESPONSES TO PHQ9 QUESTIONS 1 & 2: 2
SUM OF ALL RESPONSES TO PHQ QUESTIONS 1-9: 7
SUM OF ALL RESPONSES TO PHQ QUESTIONS 1-9: 6
4. FEELING TIRED OR HAVING LITTLE ENERGY: 0
3. TROUBLE FALLING OR STAYING ASLEEP: 2
8. MOVING OR SPEAKING SO SLOWLY THAT OTHER PEOPLE COULD HAVE NOTICED. OR THE OPPOSITE, BEING SO FIGETY OR RESTLESS THAT YOU HAVE BEEN MOVING AROUND A LOT MORE THAN USUAL: 0
7. TROUBLE CONCENTRATING ON THINGS, SUCH AS READING THE NEWSPAPER OR WATCHING TELEVISION: 1
9. THOUGHTS THAT YOU WOULD BE BETTER OFF DEAD, OR OF HURTING YOURSELF: 1
SUM OF ALL RESPONSES TO PHQ QUESTIONS 1-9: 7
5. POOR APPETITE OR OVEREATING: 0
10. IF YOU CHECKED OFF ANY PROBLEMS, HOW DIFFICULT HAVE THESE PROBLEMS MADE IT FOR YOU TO DO YOUR WORK, TAKE CARE OF THINGS AT HOME, OR GET ALONG WITH OTHER PEOPLE: 0
2. FEELING DOWN, DEPRESSED OR HOPELESS: 1

## 2024-01-12 NOTE — PROGRESS NOTES
PROGRESS NOTE  Date of Service:  1/12/2024    SUBJECTIVE:  Patient ID: Andria Mukherjee is a 63 y.o. female    ASSESSMENT  1. Primary hypertension    2. Prediabetes    3. Pure hypercholesterolemia    4. Mild episode of recurrent major depressive disorder (HCC)    5. Macromastia    6. Thyroid cancer (HCC)        PLAN:   1. Primary hypertension  -     amLODIPine (NORVASC) 10 MG tablet; Take 1 tablet by mouth daily, Disp-90 tablet, R-1Normal  -     losartan (COZAAR) 25 MG tablet; Take 1 tablet by mouth daily, Disp-90 tablet, R-1Normal  2. Prediabetes  3. Pure hypercholesterolemia  4. Mild episode of recurrent major depressive disorder (HCC)  5. Macromastia  -     External Referral To Plastic Surgery  6. Thyroid cancer (HCC)     Expect blood pressure to be controlled once on her usual medication regimen    Depression- will see psychiatry today and will defer medication mgmt to them moving forward.    Asked for referral to other plastic surg for another opinion  Continue care per endocrine and ENt  Return in about 6 months (around 7/12/2024) for annual physical with fasting labs.          HPI:       Neck issue- accupuncture, medications, p t  Managed by neurosurgery  Symptoms have improved overall      Hypertension-   Has been without her amlodopine and has had increased blood pressure readings, can feel flushed and have a headache    Hyperlipidemia- no previous medications  The 10-year ASCVD risk score (Delores DK, et al., 2019) is: 5.4%    Values used to calculate the score:      Age: 63 years      Sex: Female      Is Non- : No      Diabetic: No      Tobacco smoker: No      Systolic Blood Pressure: 132 mmHg      Is BP treated: Yes      HDL Cholesterol: 92 mg/dL      Total Cholesterol: 280 mg/dL         Depression- she is going to see a psychiatrist today     She reports continued cervicalgia, shoulder pain and finds her breasts add to the issue when she lifts breast in support she notes

## 2024-03-14 DIAGNOSIS — E89.0 HYPOTHYROIDISM, POSTSURGICAL: ICD-10-CM

## 2024-03-14 DIAGNOSIS — Z78.0 MENOPAUSE: ICD-10-CM

## 2024-03-14 DIAGNOSIS — R47.01 EXPRESSIVE APHASIA: ICD-10-CM

## 2024-03-14 DIAGNOSIS — R79.89 ELEVATED LIVER FUNCTION TESTS: ICD-10-CM

## 2024-03-14 DIAGNOSIS — C73 THYROID CANCER (HCC): ICD-10-CM

## 2024-03-14 LAB
ANTI-THYROGLOB ABS: 16 IU/ML
FOLATE SERPL-MCNC: 6.43 NG/ML (ref 4.78–24.2)
T3FREE SERPL-MCNC: 2.8 PG/ML (ref 2.3–4.2)
T4 FREE SERPL-MCNC: 1.5 NG/DL (ref 0.9–1.8)
TSH SERPL DL<=0.005 MIU/L-ACNC: 0.67 UIU/ML (ref 0.27–4.2)
VIT B12 SERPL-MCNC: 344 PG/ML (ref 211–911)

## 2024-03-15 LAB
ALBUMIN SERPL-MCNC: 4.8 G/DL (ref 3.4–5)
ALBUMIN/GLOB SERPL: 2.2 {RATIO} (ref 1.1–2.2)
ALP SERPL-CCNC: 96 U/L (ref 40–129)
ALT SERPL-CCNC: 43 U/L (ref 10–40)
ANION GAP SERPL CALCULATED.3IONS-SCNC: 15 MMOL/L (ref 3–16)
AST SERPL-CCNC: 40 U/L (ref 15–37)
BILIRUB SERPL-MCNC: 0.5 MG/DL (ref 0–1)
BUN SERPL-MCNC: 19 MG/DL (ref 7–20)
CALCIUM SERPL-MCNC: 9.2 MG/DL (ref 8.3–10.6)
CHLORIDE SERPL-SCNC: 99 MMOL/L (ref 99–110)
CO2 SERPL-SCNC: 25 MMOL/L (ref 21–32)
CREAT SERPL-MCNC: 0.7 MG/DL (ref 0.6–1.2)
GFR SERPLBLD CREATININE-BSD FMLA CKD-EPI: >60 ML/MIN/{1.73_M2}
GLUCOSE SERPL-MCNC: 111 MG/DL (ref 70–99)
POTASSIUM SERPL-SCNC: 4.4 MMOL/L (ref 3.5–5.1)
PROT SERPL-MCNC: 7 G/DL (ref 6.4–8.2)
SODIUM SERPL-SCNC: 139 MMOL/L (ref 136–145)

## 2024-03-19 ENCOUNTER — OFFICE VISIT (OUTPATIENT)
Dept: ENDOCRINOLOGY | Age: 64
End: 2024-03-19
Payer: COMMERCIAL

## 2024-03-19 VITALS
HEART RATE: 78 BPM | RESPIRATION RATE: 14 BRPM | OXYGEN SATURATION: 98 % | SYSTOLIC BLOOD PRESSURE: 152 MMHG | DIASTOLIC BLOOD PRESSURE: 85 MMHG | HEIGHT: 64 IN | WEIGHT: 192 LBS | TEMPERATURE: 98 F | BODY MASS INDEX: 32.78 KG/M2

## 2024-03-19 DIAGNOSIS — R47.01 EXPRESSIVE APHASIA: ICD-10-CM

## 2024-03-19 DIAGNOSIS — E66.9 CLASS 1 OBESITY WITH BODY MASS INDEX (BMI) OF 32.0 TO 32.9 IN ADULT, UNSPECIFIED OBESITY TYPE, UNSPECIFIED WHETHER SERIOUS COMORBIDITY PRESENT: ICD-10-CM

## 2024-03-19 DIAGNOSIS — C73 THYROID CANCER (HCC): ICD-10-CM

## 2024-03-19 DIAGNOSIS — E89.0 HYPOTHYROIDISM, POSTSURGICAL: Primary | ICD-10-CM

## 2024-03-19 DIAGNOSIS — R79.89 ELEVATED LIVER FUNCTION TESTS: ICD-10-CM

## 2024-03-19 DIAGNOSIS — Z78.0 MENOPAUSE: ICD-10-CM

## 2024-03-19 LAB — THYROGLOB SERPL-MCNC: <0.5 NG/ML (ref 1.3–31.8)

## 2024-03-19 PROCEDURE — 3079F DIAST BP 80-89 MM HG: CPT | Performed by: INTERNAL MEDICINE

## 2024-03-19 PROCEDURE — 3077F SYST BP >= 140 MM HG: CPT | Performed by: INTERNAL MEDICINE

## 2024-03-19 PROCEDURE — 99214 OFFICE O/P EST MOD 30 MIN: CPT | Performed by: INTERNAL MEDICINE

## 2024-03-19 RX ORDER — DULOXETIN HYDROCHLORIDE 30 MG/1
30 CAPSULE, DELAYED RELEASE ORAL DAILY
COMMUNITY
Start: 2024-03-06

## 2024-03-19 RX ORDER — DIAZEPAM 2 MG/1
2 TABLET ORAL NIGHTLY PRN
COMMUNITY
Start: 2024-03-01

## 2024-03-19 RX ORDER — LEVOTHYROXINE SODIUM 175 UG/1
175 TABLET ORAL DAILY
Qty: 90 TABLET | Refills: 1 | Status: SHIPPED | OUTPATIENT
Start: 2024-03-19

## 2024-03-19 NOTE — PROGRESS NOTES
SUBJECTIVE:  Andria Mukherjee is a 64 y.o. female who is being evaluated for hypothyroidism.     1. Hypothyroidism, postsurgical  This started in 2015. Patient was diagnosed with thyroid cancer, postsurgical hypothyroidism. The problem has been unchanged. Previous thyroid studies include: TSH and free thyroxine.   Patient started medication in 2015. Currently patient is on: levothyroxine. Misses  0 doses a month.    Current complaints: denies fatigue, weight changes, heat intolerance, bowel/skin changes.   Has Raynaud's. Has cold sensitivity.  Has nail changes, dry skin.  Surgeon Dr. Lemus.  Not followed by him, [facility] insurance does not cover.  No I-131 Tx    2. Thyroid cancer (HCC)  No swelling or lump sensation in the neck area    2/13/2015 Operations/Procedures: Total thyroidectomy, central neck dissection 2/13/15    Doing well s/p total+CND for encapsulated FV PTC [id number] (0/2 LN)  LT4, TSH/Tg/renal today, GOVEA +/- but unlikely unless Tg elevated  Clinical History:    Pre-Operative Diagnosis: Follicular variant of papillary thyroid Ca, right    thyroid nodule as [facility] FNA cytology ANB-15-20.    Post-Operative Diagnosis:     Same    Specimen(s) Submitted:   A. Total Thyroid; B. Right Central Node Dissection        CPT Code(s):   88307 X 2    Office Info:   [id number]        FINAL DIAGNOSIS:        A.  Thyroid, total thyroidectomy:    - Papillary carcinoma, right lobe, completely excised; see synoptic report.        B.  Right central node dissection:    - Two lymph nodes negative for metastatic carcinoma; see synoptic report.        SYNOPTIC REPORT    THYROID GLAND: Resection        Procedure:  Total thyroidectomy        Lymph Node Sampling:  Right central node dissection        Tumor Focality:  Unifocal        Tumor Laterality:  Right lobe        Tumor Size:  Greatest dimension: 1.4 cm (gross examination)        Histologic Type:  Papillary carcinoma, follicular variant, encapsulated,    without

## 2024-04-08 DIAGNOSIS — F43.0 ACUTE STRESS REACTION: ICD-10-CM

## 2024-04-08 RX ORDER — BUSPIRONE HYDROCHLORIDE 10 MG/1
10 TABLET ORAL 2 TIMES DAILY
Qty: 60 TABLET | Refills: 0 | OUTPATIENT
Start: 2024-04-08 | End: 2024-05-08

## 2024-04-19 SDOH — HEALTH STABILITY: PHYSICAL HEALTH: ON AVERAGE, HOW MANY DAYS PER WEEK DO YOU ENGAGE IN MODERATE TO STRENUOUS EXERCISE (LIKE A BRISK WALK)?: 4 DAYS

## 2024-04-19 SDOH — HEALTH STABILITY: PHYSICAL HEALTH: ON AVERAGE, HOW MANY MINUTES DO YOU ENGAGE IN EXERCISE AT THIS LEVEL?: PATIENT DECLINED

## 2024-04-21 NOTE — PROGRESS NOTES
MUCOSAL RESECTION performed by Wesley Lau MD at [facility] ENDOSCOPY    COLONOSCOPY  04/12/2022    COLONOSCOPY POLYPECTOMY SNARE/COLD BIOPSY performed by Wesley Lau MD at [facility] ENDOSCOPY    ENDOSCOPY, COLON, DIAGNOSTIC      GALLBLADDER SURGERY      HYSTERECTOMY (CERVIX STATUS UNKNOWN)      HYSTERECTOMY, VAGINAL      SHOULDER SURGERY Right     TONSILLECTOMY      WISDOM TOOTH EXTRACTION         Current Outpatient Medications   Medication Sig Dispense Refill    DULoxetine (CYMBALTA) 30 MG extended release capsule Take 1 capsule by mouth daily      diazePAM (VALIUM) 2 MG tablet Take 1 tablet by mouth nightly as needed.      levothyroxine (SYNTHROID) 175 MCG tablet Take 1 tablet by mouth Daily 90 tablet 1    amLODIPine (NORVASC) 10 MG tablet Take 1 tablet by mouth daily 90 tablet 1    losartan (COZAAR) 25 MG tablet Take 1 tablet by mouth daily (Patient taking differently: Take 1 tablet by mouth daily LOSARTAN POTASSIUM) 90 tablet 1    Coenzyme Q10 (COQ10) 100 MG CAPS       Ginger, Zingiber officinalis, (GINGER ROOT) 250 MG CAPS       Riboflavin 100 MG CAPS       montelukast (SINGULAIR) 10 MG tablet TAKE ONE TABLET BY MOUTH NIGHTLY 90 tablet 3    omeprazole (PRILOSEC) 40 MG delayed release capsule Take 1 capsule by mouth daily 90 capsule 3    gabapentin (NEURONTIN) 300 MG capsule Take 1 capsule by mouth 3 times daily as needed.      fluticasone (FLONASE) 50 MCG/ACT nasal spray 2 sprays by Nasal route daily 3 each 1    azelastine (ASTELIN) 0.1 % nasal spray 1 spray by Nasal route 2 times daily Use in each nostril as directed 3 each 1    tiZANidine (ZANAFLEX) 4 MG tablet Take 1 tablet by mouth daily      meloxicam (MOBIC) 15 MG tablet Take 1 tablet by mouth daily      lidocaine (LIDODERM) 5 % Place 1 patch onto the skin daily 12 hours on, 12 hours off. 30 patch 0    APPLE CIDER VINEGAR PO Take by mouth      Nutritional Supplements (ESTROVEN NIGHTTIME PO) Take by mouth      budesonide-formoterol (SYMBICORT) 160-4.5

## 2024-04-22 ENCOUNTER — OFFICE VISIT (OUTPATIENT)
Age: 64
End: 2024-04-22
Payer: COMMERCIAL

## 2024-04-22 ENCOUNTER — HOSPITAL ENCOUNTER (OUTPATIENT)
Age: 64
Discharge: HOME | End: 2024-04-22
Payer: COMMERCIAL

## 2024-04-22 VITALS — HEIGHT: 64 IN | BODY MASS INDEX: 32.78 KG/M2 | WEIGHT: 192 LBS

## 2024-04-22 DIAGNOSIS — M25.561 RIGHT KNEE PAIN, UNSPECIFIED CHRONICITY: Primary | ICD-10-CM

## 2024-04-22 DIAGNOSIS — M23.92 ACUTE INTERNAL DERANGEMENT OF LEFT KNEE: Primary | ICD-10-CM

## 2024-04-22 DIAGNOSIS — M25.561 RIGHT KNEE PAIN, UNSPECIFIED CHRONICITY: ICD-10-CM

## 2024-04-22 PROCEDURE — 73564 X-RAY EXAM KNEE 4 OR MORE: CPT

## 2024-04-22 PROCEDURE — 99213 OFFICE O/P EST LOW 20 MIN: CPT | Performed by: ORTHOPAEDIC SURGERY

## 2024-04-26 ENCOUNTER — HOSPITAL ENCOUNTER (OUTPATIENT)
Age: 64
Discharge: HOME | End: 2024-04-26
Attending: ORTHOPAEDIC SURGERY
Payer: COMMERCIAL

## 2024-04-26 DIAGNOSIS — M23.92 ACUTE INTERNAL DERANGEMENT OF LEFT KNEE: ICD-10-CM

## 2024-04-26 PROCEDURE — 73721 MRI JNT OF LWR EXTRE W/O DYE: CPT

## 2024-05-05 SDOH — ECONOMIC STABILITY: FOOD INSECURITY: WITHIN THE PAST 12 MONTHS, THE FOOD YOU BOUGHT JUST DIDN'T LAST AND YOU DIDN'T HAVE MONEY TO GET MORE.: PATIENT DECLINED

## 2024-05-05 SDOH — ECONOMIC STABILITY: FOOD INSECURITY: WITHIN THE PAST 12 MONTHS, YOU WORRIED THAT YOUR FOOD WOULD RUN OUT BEFORE YOU GOT MONEY TO BUY MORE.: PATIENT DECLINED

## 2024-05-05 SDOH — ECONOMIC STABILITY: HOUSING INSECURITY
IN THE LAST 12 MONTHS, WAS THERE A TIME WHEN YOU DID NOT HAVE A STEADY PLACE TO SLEEP OR SLEPT IN A SHELTER (INCLUDING NOW)?: PATIENT DECLINED

## 2024-05-05 SDOH — ECONOMIC STABILITY: TRANSPORTATION INSECURITY
IN THE PAST 12 MONTHS, HAS LACK OF TRANSPORTATION KEPT YOU FROM MEETINGS, WORK, OR FROM GETTING THINGS NEEDED FOR DAILY LIVING?: PATIENT DECLINED

## 2024-05-05 SDOH — ECONOMIC STABILITY: INCOME INSECURITY: HOW HARD IS IT FOR YOU TO PAY FOR THE VERY BASICS LIKE FOOD, HOUSING, MEDICAL CARE, AND HEATING?: PATIENT DECLINED

## 2024-05-05 NOTE — PROGRESS NOTES
PROGRESS NOTE  Date of Service:  5/6/2024    SUBJECTIVE:  Patient ID: Andria Mukherjee is a 64 y.o. female    ASSESSMENT  1. Primary hypertension    2. Prediabetes    3. Pure hypercholesterolemia        PLAN:   1. Primary hypertension  -     losartan (COZAAR) 25 MG tablet; Take 1 tablet by mouth daily, Disp-90 tablet, R-1Normal  -     amLODIPine (NORVASC) 10 MG tablet; Take 1 tablet by mouth daily, Disp-90 tablet, R-1Normal  2. Prediabetes  3. Pure hypercholesterolemia     Blood pressure is well controlled. Continue medication regimen. Recommend home blood pressure monitoring. Recommend low sodium diet, at least 150 minutes of cardiovascular exercise each week. Recommend weight loss.     Cholesterol is elevated. I do recommend moving to a more plant based diet and decreasing processed foods if not doing so already. Increasing cardiovascular exercise - such as biking, swimming, walking- can also help lower cholesterol.A psyllium supplement taking 2 tablets twice a day can also help lower ldl.    Return in about 6 months (around 11/6/2024) for annual physical with fasting labs.          HPI:       Hypertension-   Blood pressure running 130/80  No diff with medication    Prediabetes-no increased thirst or urination      Hyperlipidemia- no previous medications      Depression and anxiey- managed by psychiatry.  Symptoms have been under improved control and she is feeling well overall      Asthma- stable overall    Thyroid cancer- follows with endocrine and ent    Patient's medications, allergies, past medical, surgical, social and family histories were reviewed and updated as appropriate.         OBJECTIVE:  Vitals:    05/06/24 1252   BP: 130/80   Site: Left Upper Arm   Position: Sitting   Cuff Size: Large Adult   Pulse: 86   Resp: 14   Temp: 97.5 °F (36.4 °C)   SpO2: 98%   Weight: 87.8 kg (193 lb 9.6 oz)   Height: 1.626 m (5' 4\")      Body mass index is 33.23 kg/m².   Physical Exam  Constitutional:

## 2024-05-06 ENCOUNTER — OFFICE VISIT (OUTPATIENT)
Dept: PRIMARY CARE CLINIC | Age: 64
End: 2024-05-06
Payer: COMMERCIAL

## 2024-05-06 VITALS
SYSTOLIC BLOOD PRESSURE: 130 MMHG | DIASTOLIC BLOOD PRESSURE: 80 MMHG | TEMPERATURE: 97.5 F | HEIGHT: 64 IN | BODY MASS INDEX: 33.05 KG/M2 | HEART RATE: 86 BPM | RESPIRATION RATE: 14 BRPM | WEIGHT: 193.6 LBS | OXYGEN SATURATION: 98 %

## 2024-05-06 DIAGNOSIS — I10 PRIMARY HYPERTENSION: Primary | ICD-10-CM

## 2024-05-06 DIAGNOSIS — R73.03 PREDIABETES: ICD-10-CM

## 2024-05-06 DIAGNOSIS — E78.00 PURE HYPERCHOLESTEROLEMIA: ICD-10-CM

## 2024-05-06 PROCEDURE — 3079F DIAST BP 80-89 MM HG: CPT | Performed by: FAMILY MEDICINE

## 2024-05-06 PROCEDURE — 99214 OFFICE O/P EST MOD 30 MIN: CPT | Performed by: FAMILY MEDICINE

## 2024-05-06 PROCEDURE — 3075F SYST BP GE 130 - 139MM HG: CPT | Performed by: FAMILY MEDICINE

## 2024-05-06 RX ORDER — AMLODIPINE BESYLATE 10 MG/1
TABLET ORAL
Qty: 90 TABLET | Refills: 1 | Status: SHIPPED | OUTPATIENT
Start: 2024-05-06

## 2024-05-06 RX ORDER — LOSARTAN POTASSIUM 25 MG/1
25 TABLET ORAL DAILY
Qty: 90 TABLET | Refills: 1 | Status: SHIPPED | OUTPATIENT
Start: 2024-05-06

## 2024-05-09 ENCOUNTER — PREP FOR PROCEDURE (OUTPATIENT)
Age: 64
End: 2024-05-09

## 2024-05-09 ENCOUNTER — OFFICE VISIT (OUTPATIENT)
Age: 64
End: 2024-05-09
Payer: COMMERCIAL

## 2024-05-09 VITALS — BODY MASS INDEX: 32.95 KG/M2 | HEIGHT: 64 IN | WEIGHT: 193 LBS

## 2024-05-09 DIAGNOSIS — M71.22 POPLITEAL SYNOVIAL CYST, LEFT: ICD-10-CM

## 2024-05-09 DIAGNOSIS — M71.21 POPLITEAL SYNOVIAL CYST, RIGHT: ICD-10-CM

## 2024-05-09 DIAGNOSIS — M71.21 POPLITEAL CYST, RIGHT: ICD-10-CM

## 2024-05-09 DIAGNOSIS — S83.242D ACUTE MEDIAL MENISCUS TEAR OF LEFT KNEE, SUBSEQUENT ENCOUNTER: Primary | ICD-10-CM

## 2024-05-09 DIAGNOSIS — Z01.818 PRE-OP TESTING: Primary | ICD-10-CM

## 2024-05-09 PROBLEM — S83.242A ACUTE MEDIAL MENISCUS TEAR OF LEFT KNEE: Status: ACTIVE | Noted: 2024-05-09

## 2024-05-09 PROCEDURE — 99213 OFFICE O/P EST LOW 20 MIN: CPT | Performed by: ORTHOPAEDIC SURGERY

## 2024-05-09 RX ORDER — SODIUM CHLORIDE 9 MG/ML
INJECTION, SOLUTION INTRAVENOUS CONTINUOUS
Status: CANCELLED | OUTPATIENT
Start: 2024-05-09

## 2024-05-09 RX ORDER — SODIUM CHLORIDE 9 MG/ML
INJECTION, SOLUTION INTRAVENOUS PRN
Status: CANCELLED | OUTPATIENT
Start: 2024-05-09

## 2024-05-09 RX ORDER — TRANEXAMIC ACID 650 MG/1
1950 TABLET ORAL
Status: CANCELLED | OUTPATIENT
Start: 2024-05-09

## 2024-05-09 RX ORDER — SODIUM CHLORIDE 0.9 % (FLUSH) 0.9 %
5-40 SYRINGE (ML) INJECTION EVERY 12 HOURS SCHEDULED
Status: CANCELLED | OUTPATIENT
Start: 2024-05-09

## 2024-05-09 RX ORDER — MELOXICAM 7.5 MG/1
7.5 TABLET ORAL ONCE
Status: CANCELLED | OUTPATIENT
Start: 2024-05-09 | End: 2024-05-09

## 2024-05-09 RX ORDER — ACETAMINOPHEN 325 MG/1
1000 TABLET ORAL ONCE
Status: CANCELLED | OUTPATIENT
Start: 2024-05-09 | End: 2024-05-09

## 2024-05-09 RX ORDER — SODIUM CHLORIDE 0.9 % (FLUSH) 0.9 %
5-40 SYRINGE (ML) INJECTION PRN
Status: CANCELLED | OUTPATIENT
Start: 2024-05-09

## 2024-05-09 NOTE — PROGRESS NOTES
ambulation.    Skin: no erythema, lesions or rashes    MRI Right knee 4/26/2024  IMPRESSION:  1.  Radial tear of the posterior horn medial meniscus.  2.  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  Moderate knee joint effusion with synovitis.  4.  Multilobulated Baker's cyst with evidence of rupture    Assessment:   Diagnosis Orders   1. Acute medial meniscus tear of left knee, subsequent encounter        2. Popliteal cyst, right          Plan:  We discussed treatment options today.  She still has considerable issues with joint line pain and swelling from her Baker's cyst.  She does have underlying degenerative changes primarily in the patellofemoral compartment we reviewed her MRI findings and discussed the option of arthroscopic intervention.  Based on the meniscal tear size and size of the Baker's cyst I am suspicious that injection would not provide adequate symptom relief.  We discussed the option of left knee arthroscopy with partial medial meniscectomy as well as debridement of the Baker's cyst either through arthroscopic or open means determined at the time of surgery.  She understands the risks of surgery include but are not limited to: Infection, wrist and neurovascular structures, persistent stiffness and pain, possible need for further surgery, incomplete pain relief, anesthetic misadventure and other medical or surgical complications that could threaten her life or limb.  She is prepared to proceed and we will get her scheduled at the earliest available convenience once we have insurance precertification.  She will have her standard preoperative laboratory studies and workup as well as a visit with her PCP for history and physical prior to surgical date.  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    She understands and accepts this course of care.      Christopher 
here for suture removal. 3 sutures left distal thumb, no evidence of dehiscence or infection. suture remove by acp.  I Edvin Butcher, performed a face to face evaluation including relevant history of present illness, review of systems, past medical history, past surgical history, social and family history.  I discussed plan of care with the patient and advanced practice practitioner and agree with the advanced practice practitioner caring for the patient.

## 2024-05-10 ENCOUNTER — TELEPHONE (OUTPATIENT)
Dept: ORTHOPEDIC SURGERY | Age: 64
End: 2024-05-10

## 2024-05-10 PROBLEM — Z78.0 MENOPAUSE: Status: RESOLVED | Noted: 2024-03-19 | Resolved: 2024-05-10

## 2024-05-10 PROBLEM — N95.1 MENOPAUSAL HOT FLUSHES: Status: RESOLVED | Noted: 2020-10-26 | Resolved: 2024-05-10

## 2024-05-10 ASSESSMENT — PATIENT HEALTH QUESTIONNAIRE - PHQ9
9. THOUGHTS THAT YOU WOULD BE BETTER OFF DEAD, OR OF HURTING YOURSELF: NOT AT ALL
3. TROUBLE FALLING OR STAYING ASLEEP: SEVERAL DAYS
6. FEELING BAD ABOUT YOURSELF - OR THAT YOU ARE A FAILURE OR HAVE LET YOURSELF OR YOUR FAMILY DOWN: NOT AT ALL
10. IF YOU CHECKED OFF ANY PROBLEMS, HOW DIFFICULT HAVE THESE PROBLEMS MADE IT FOR YOU TO DO YOUR WORK, TAKE CARE OF THINGS AT HOME, OR GET ALONG WITH OTHER PEOPLE: NOT DIFFICULT AT ALL
4. FEELING TIRED OR HAVING LITTLE ENERGY: NOT AT ALL
8. MOVING OR SPEAKING SO SLOWLY THAT OTHER PEOPLE COULD HAVE NOTICED. OR THE OPPOSITE, BEING SO FIGETY OR RESTLESS THAT YOU HAVE BEEN MOVING AROUND A LOT MORE THAN USUAL: NOT AT ALL
SUM OF ALL RESPONSES TO PHQ QUESTIONS 1-9: 2
SUM OF ALL RESPONSES TO PHQ9 QUESTIONS 1 & 2: 1
SUM OF ALL RESPONSES TO PHQ QUESTIONS 1-9: 2
SUM OF ALL RESPONSES TO PHQ QUESTIONS 1-9: 2
1. LITTLE INTEREST OR PLEASURE IN DOING THINGS: SEVERAL DAYS
7. TROUBLE CONCENTRATING ON THINGS, SUCH AS READING THE NEWSPAPER OR WATCHING TELEVISION: NOT AT ALL
5. POOR APPETITE OR OVEREATING: NOT AT ALL
2. FEELING DOWN, DEPRESSED OR HOPELESS: NOT AT ALL
SUM OF ALL RESPONSES TO PHQ QUESTIONS 1-9: 2

## 2024-05-13 ENCOUNTER — TELEPHONE (OUTPATIENT)
Dept: PRIMARY CARE CLINIC | Age: 64
End: 2024-05-13

## 2024-05-13 PROBLEM — M71.21: Status: ACTIVE | Noted: 2024-05-09

## 2024-05-13 PROBLEM — S83.241A ACUTE MEDIAL MENISCUS TEAR OF RIGHT KNEE: Status: ACTIVE | Noted: 2024-05-09

## 2024-05-13 NOTE — PROGRESS NOTES
[facility] PRE-OPERATIVE INSTRUCTIONS       DOS: __5/22/24__        Pre-Op Instructions     [x]  A History and Physical will be required within 30 days prior to surgery date. Some patients may require cardiac or pulmonary clearance. H&P will be completed DOS for Endo/colonoscopy patients.     [x]  Reviewed Medical and Surgical history, medication list, confirmed with patient any implants, allergies, bleeding disorders, KHLOE and reactions to Anesthesia.    [x]  If there is a change in physical condition between now and the day of surgery, please notify your surgeon. This includes a cough, cold, fever, sore throat, nausea, vomiting and diarrhea. Also notify your surgeon if you experience dizziness, shortness of breath or blurred vision.    [x] Reviewed hx of C-Diff, MRSA, VRE and/or recent use of Antibiotics     [x]  All patients having a procedure must have a ride home by a responsible person that is over the age of 18 and ensure it is someone that we can share medical information with. After discharge, a responsible adult needs to stay with you for 24 hours. There is a limit of 2 adult visitors per room.     If unable to secure ride and/or care taker, please contact surgeon's office.      [x]  No alcohol, smoking or marijuana use 24 hours prior to surgery. Any use of recreational drugs must be stopped 5 days prior to surgery.     [x]  NPO after midnight (Any heart, BP, seizure, thyroid and breathing medications are okay to take the morning of surgery with a small sip of water).    The morning of surgery, you may brush your teeth, just no swallowing water. Also, NO gum, candy, mints or ice chips.    []  For Colonoscopy's, follow prep-instructions as indicated by physician.     []  Patients with a insulin pump, keep set on basal rate on day of surgery. Long-acting insulin should be administered the evening before, take only half of usual dose. Always check with prescribing doctor if there are any

## 2024-05-13 NOTE — TELEPHONE ENCOUNTER
Ana muñoz/ [facility] calling into office inquiring if patient had a complete history and physical at her appt on 5/6. Patient has an upcoming surgery on 5/22 and they need to know if patient needs to come back in or not. Please advise.     Call Ana @ her direct line @[phone].

## 2024-05-13 NOTE — PROGRESS NOTES
FOCUS NOTES:    DOS: 5/22/24   Surgeon: Blair    Date:  Follow up  Comment     PAT Initials  5/13/24 Attempted to reach pt on 5/10/24, LVM to return call. Noted in Epic patient had seen PCP on 5/6/24. Office note mentioned knee injury and Ortho appt, but did not include auscultation of breath sounds or heart sounds. Note update seen today does have heart sounds, no breath sounds, and no mention of ortho procedure. Call to Dr Berg's office placed to determine if patient needs to make new appointment for preop H&P.  Office will connect with Dr Berg and return call.  NT    5/13/24 Patient states pre-op H&P and labs scheduled for tomorrow, 5/14/24. Also that the surgery is to be done on the RIGHT knee, not the left as is scheduled. Message sent to Erasto at Dr Pinto's office to amend surgery schedule.

## 2024-05-14 ENCOUNTER — OFFICE VISIT (OUTPATIENT)
Dept: PRIMARY CARE CLINIC | Age: 64
End: 2024-05-14
Payer: COMMERCIAL

## 2024-05-14 VITALS
WEIGHT: 193.4 LBS | BODY MASS INDEX: 33.02 KG/M2 | RESPIRATION RATE: 16 BRPM | TEMPERATURE: 98 F | HEIGHT: 64 IN | SYSTOLIC BLOOD PRESSURE: 142 MMHG | OXYGEN SATURATION: 95 % | HEART RATE: 80 BPM | DIASTOLIC BLOOD PRESSURE: 88 MMHG

## 2024-05-14 DIAGNOSIS — I10 PRIMARY HYPERTENSION: ICD-10-CM

## 2024-05-14 DIAGNOSIS — M71.21 POPLITEAL SYNOVIAL CYST, RIGHT: Primary | ICD-10-CM

## 2024-05-14 DIAGNOSIS — Z01.818 PRE-OP TESTING: ICD-10-CM

## 2024-05-14 DIAGNOSIS — J45.30 MILD PERSISTENT ASTHMA WITHOUT COMPLICATION: ICD-10-CM

## 2024-05-14 DIAGNOSIS — Z01.818 PREOP EXAMINATION: ICD-10-CM

## 2024-05-14 DIAGNOSIS — E89.0 HYPOTHYROIDISM, POSTSURGICAL: ICD-10-CM

## 2024-05-14 DIAGNOSIS — E66.9 CLASS 1 OBESITY WITH SERIOUS COMORBIDITY AND BODY MASS INDEX (BMI) OF 32.0 TO 32.9 IN ADULT, UNSPECIFIED OBESITY TYPE: ICD-10-CM

## 2024-05-14 DIAGNOSIS — R73.03 PREDIABETES: ICD-10-CM

## 2024-05-14 PROBLEM — R59.1 LYMPHADENOPATHY: Status: RESOLVED | Noted: 2023-09-26 | Resolved: 2024-05-14

## 2024-05-14 LAB
INR PPP: 0.88 (ref 0.85–1.15)
PROTHROMBIN TIME: 12.2 SEC (ref 11.9–14.9)

## 2024-05-14 PROCEDURE — 3079F DIAST BP 80-89 MM HG: CPT | Performed by: FAMILY MEDICINE

## 2024-05-14 PROCEDURE — 3077F SYST BP >= 140 MM HG: CPT | Performed by: FAMILY MEDICINE

## 2024-05-14 PROCEDURE — 93000 ELECTROCARDIOGRAM COMPLETE: CPT | Performed by: FAMILY MEDICINE

## 2024-05-14 PROCEDURE — 99214 OFFICE O/P EST MOD 30 MIN: CPT | Performed by: FAMILY MEDICINE

## 2024-05-14 ASSESSMENT — ENCOUNTER SYMPTOMS
CHEST TIGHTNESS: 0
WHEEZING: 0

## 2024-05-14 NOTE — PROGRESS NOTES
Preoperative Consultation    Date of Service: 5/14/2024   Patient: Andria Mukherjee  YOB: 1960     This patient presents to the office today for a preoperative consultation at the request of surgeon, , who plans on performing right knee arthroscopy on May 22.      Planned anesthesia: General   Known anesthesia problems: None   Bleeding risk: No recent or remote history of abnormal bleeding  Personal or FH ofDVT/PE: No    Personal History of Snoring and/or Sleep Apnea: no    Patient Active Problem List   Diagnosis    Raynaud's disease    Primary hypertension    Fibromyalgia    Hx of thyroid cancer    Hypothyroidism, postsurgical    Class 1 obesity with serious comorbidity and body mass index (BMI) of 32.0 to 32.9 in adult    Prediabetes    Pure hypercholesterolemia    Mild persistent asthma without complication    Mild episode of recurrent major depressive disorder (HCC)    Cervicalgia    Thyroid cancer (HCC)    Elevated liver function tests    Expressive aphasia    Insomnia due to other mental disorder    Acute medial meniscus tear of right knee    Popliteal synovial cyst, right     Past Surgical History:   Procedure Laterality Date    BREAST BIOPSY Right 2003    excisional bx, benign    BUNIONECTOMY Bilateral     CARPAL TUNNEL RELEASE      CHOLECYSTECTOMY      COLONOSCOPY  11/02/2021    COLONOSCOPY POLYPECTOMY ABLATION performed by Wesley Lau MD at [facility] ENDOSCOPY    COLONOSCOPY  11/02/2021    COLONOSCOPY W/ ENDOSCOPIC MUCOSAL RESECTION performed by Wesley Lau MD at [facility] ENDOSCOPY    COLONOSCOPY  04/12/2022    COLONOSCOPY POLYPECTOMY SNARE/COLD BIOPSY performed by Wesley Lau MD at [facility] ENDOSCOPY    ENDOSCOPY, COLON, DIAGNOSTIC      GALLBLADDER SURGERY      HYSTERECTOMY (CERVIX STATUS UNKNOWN)      HYSTERECTOMY, VAGINAL      SHOULDER SURGERY Right     THYROIDECTOMY      total    TONSILLECTOMY      WISDOM TOOTH EXTRACTION       Allergies   Allergen Reactions    Zofran

## 2024-05-14 NOTE — TELEPHONE ENCOUNTER
APPROVAL     DX CODE: S83.242D; M71.22   CPT CODE: 71426; 75102  AREA OF SURGERY: left knee   DATE RANGE: 05/22/2024  to 08/19/2024   LOCATION: [facility]  INSURANCE: [facility]  AUTH #: [id number]     Approval scanned in Media

## 2024-05-14 NOTE — H&P (VIEW-ONLY)
Findings: No rash.   Neurological:      General: No focal deficit present.      Mental Status: She is alert and oriented to person, place, and time.      Cranial Nerves: No cranial nerve deficit.   Psychiatric:         Mood and Affect: Mood normal.         Behavior: Behavior normal.         Thought Content: Thought content normal.         Judgment: Judgment normal.         EKG Interpretation:  normal EKG, normal sinus rhythm, unchanged from previous tracings.    Lab Review Yes    ASSESSMENT:  Andria was seen today for pre-op exam.    Diagnoses and all orders for this visit:    Popliteal synovial cyst, right    Preop examination    Primary hypertension  -     EKG 12 Lead    Prediabetes    Hypothyroidism, postsurgical    Mild persistent asthma without complication    Class 1 obesity with serious comorbidity and body mass index (BMI) of 32.0 to 32.9 in adult, unspecified obesity type        64 y.o. patient approved for Surgery      PLAN:     1. Preoperative workup as follows: ECG, hemoglobin, hematocrit, electrolytes, creatinine, glucose, coagulation studies- orders placed by surgeon  2. Change in medication regimen before surgery:Discontinue NSAIDs 10 days before surgery, stop all supplements 7 days before surgery  3. No contraindications to planned surgery    Electronically signed by CLAIRE FLORES MD on 5/14/24 at 1:23 PM.

## 2024-05-15 ENCOUNTER — TELEPHONE (OUTPATIENT)
Dept: ORTHOPEDIC SURGERY | Age: 64
End: 2024-05-15

## 2024-05-15 LAB
ANION GAP SERPL CALCULATED.3IONS-SCNC: 18 MMOL/L (ref 3–16)
BASOPHILS # BLD: 0 K/UL (ref 0–0.2)
BASOPHILS NFR BLD: 0.9 %
BUN SERPL-MCNC: 13 MG/DL (ref 7–20)
CALCIUM SERPL-MCNC: 9.4 MG/DL (ref 8.3–10.6)
CHLORIDE SERPL-SCNC: 98 MMOL/L (ref 99–110)
CO2 SERPL-SCNC: 24 MMOL/L (ref 21–32)
CREAT SERPL-MCNC: 0.6 MG/DL (ref 0.6–1.2)
DEPRECATED RDW RBC AUTO: 13.3 % (ref 12.4–15.4)
EOSINOPHIL # BLD: 0.1 K/UL (ref 0–0.6)
EOSINOPHIL NFR BLD: 2.6 %
GFR SERPLBLD CREATININE-BSD FMLA CKD-EPI: >90 ML/MIN/{1.73_M2}
GLUCOSE SERPL-MCNC: 115 MG/DL (ref 70–99)
HCT VFR BLD AUTO: 43.2 % (ref 36–48)
HGB BLD-MCNC: 14.6 G/DL (ref 12–16)
LYMPHOCYTES # BLD: 1.8 K/UL (ref 1–5.1)
LYMPHOCYTES NFR BLD: 33.2 %
MCH RBC QN AUTO: 32.3 PG (ref 26–34)
MCHC RBC AUTO-ENTMCNC: 33.8 G/DL (ref 31–36)
MCV RBC AUTO: 95.7 FL (ref 80–100)
MONOCYTES # BLD: 0.5 K/UL (ref 0–1.3)
MONOCYTES NFR BLD: 8.5 %
NEUTROPHILS # BLD: 3 K/UL (ref 1.7–7.7)
NEUTROPHILS NFR BLD: 54.8 %
PLATELET # BLD AUTO: 301 K/UL (ref 135–450)
PMV BLD AUTO: 8.7 FL (ref 5–10.5)
POTASSIUM SERPL-SCNC: 4.2 MMOL/L (ref 3.5–5.1)
RBC # BLD AUTO: 4.52 M/UL (ref 4–5.2)
SODIUM SERPL-SCNC: 140 MMOL/L (ref 136–145)
WBC # BLD AUTO: 5.5 K/UL (ref 4–11)

## 2024-05-15 NOTE — TELEPHONE ENCOUNTER
Appointment Request       Patient Contact Number: [phone]       THE PATIENT MISSED THE VIRTUAL VISIT WITH DR HOYT.  SHE WAS AT WORK AND MISSED IT.  CAN SHE GET ANOTHER ONE SET UP?  SHE'S OFF WORK TOMORROW.

## 2024-05-15 NOTE — TELEPHONE ENCOUNTER
Called patient, Spoke with patient about rescheudling VV with Dr. Pinto, make VV appointment for 05/16/24 at 3:15PM

## 2024-05-16 ENCOUNTER — TELEMEDICINE (OUTPATIENT)
Age: 64
End: 2024-05-16

## 2024-05-16 ENCOUNTER — TELEPHONE (OUTPATIENT)
Age: 64
End: 2024-05-16

## 2024-05-16 DIAGNOSIS — M71.21 POPLITEAL SYNOVIAL CYST, RIGHT: ICD-10-CM

## 2024-05-16 DIAGNOSIS — S83.241D ACUTE MEDIAL MENISCUS TEAR OF RIGHT KNEE, SUBSEQUENT ENCOUNTER: Primary | ICD-10-CM

## 2024-05-16 NOTE — TELEPHONE ENCOUNTER
Approval is now for Rt knee surgery.      Auth: # [id number]    Date Range: 05/22/24 thru 08/19/24  Type of SX:  Outpatient  Location: [facility]  CPT: 92501,63766   DX Code: S83.241D, M71.21  SX area: Rt knee  Insurance: [facility]

## 2024-05-20 ENCOUNTER — TELEPHONE (OUTPATIENT)
Dept: ORTHOPEDIC SURGERY | Age: 64
End: 2024-05-20

## 2024-05-22 ENCOUNTER — ANESTHESIA EVENT (OUTPATIENT)
Age: 64
End: 2024-05-22
Payer: COMMERCIAL

## 2024-05-22 ENCOUNTER — ANESTHESIA (OUTPATIENT)
Age: 64
End: 2024-05-22
Payer: COMMERCIAL

## 2024-05-22 ENCOUNTER — HOSPITAL ENCOUNTER (OUTPATIENT)
Age: 64
Setting detail: OUTPATIENT SURGERY
Discharge: HOME | End: 2024-05-22
Attending: ORTHOPAEDIC SURGERY | Admitting: ORTHOPAEDIC SURGERY
Payer: COMMERCIAL

## 2024-05-22 VITALS
HEIGHT: 64 IN | WEIGHT: 188 LBS | OXYGEN SATURATION: 94 % | DIASTOLIC BLOOD PRESSURE: 71 MMHG | TEMPERATURE: 98 F | HEART RATE: 105 BPM | SYSTOLIC BLOOD PRESSURE: 123 MMHG | BODY MASS INDEX: 32.1 KG/M2 | RESPIRATION RATE: 13 BRPM

## 2024-05-22 DIAGNOSIS — S83.241D ACUTE MEDIAL MENISCUS TEAR OF RIGHT KNEE, SUBSEQUENT ENCOUNTER: Primary | ICD-10-CM

## 2024-05-22 PROCEDURE — 6360000002 HC RX W HCPCS: Performed by: NURSE ANESTHETIST, CERTIFIED REGISTERED

## 2024-05-22 PROCEDURE — 2580000003 HC RX 258: Performed by: ORTHOPAEDIC SURGERY

## 2024-05-22 PROCEDURE — 6360000002 HC RX W HCPCS

## 2024-05-22 PROCEDURE — 6360000002 HC RX W HCPCS: Performed by: ANESTHESIOLOGY

## 2024-05-22 PROCEDURE — 6360000002 HC RX W HCPCS: Performed by: ORTHOPAEDIC SURGERY

## 2024-05-22 PROCEDURE — 6370000000 HC RX 637 (ALT 250 FOR IP): Performed by: ORTHOPAEDIC SURGERY

## 2024-05-22 PROCEDURE — 6370000000 HC RX 637 (ALT 250 FOR IP)

## 2024-05-22 PROCEDURE — 3700000000 HC ANESTHESIA ATTENDED CARE: Performed by: ORTHOPAEDIC SURGERY

## 2024-05-22 PROCEDURE — A4217 STERILE WATER/SALINE, 500 ML: HCPCS | Performed by: ORTHOPAEDIC SURGERY

## 2024-05-22 PROCEDURE — 6370000000 HC RX 637 (ALT 250 FOR IP): Performed by: NURSE ANESTHETIST, CERTIFIED REGISTERED

## 2024-05-22 PROCEDURE — 2709999900 HC NON-CHARGEABLE SUPPLY: Performed by: ORTHOPAEDIC SURGERY

## 2024-05-22 PROCEDURE — 6370000000 HC RX 637 (ALT 250 FOR IP): Performed by: ANESTHESIOLOGY

## 2024-05-22 PROCEDURE — 2500000003 HC RX 250 WO HCPCS: Performed by: NURSE ANESTHETIST, CERTIFIED REGISTERED

## 2024-05-22 PROCEDURE — 3600000014 HC SURGERY LEVEL 4 ADDTL 15MIN: Performed by: ORTHOPAEDIC SURGERY

## 2024-05-22 PROCEDURE — 7100000000 HC PACU RECOVERY - FIRST 15 MIN: Performed by: ORTHOPAEDIC SURGERY

## 2024-05-22 PROCEDURE — 94761 N-INVAS EAR/PLS OXIMETRY MLT: CPT

## 2024-05-22 PROCEDURE — 2580000003 HC RX 258: Performed by: NURSE ANESTHETIST, CERTIFIED REGISTERED

## 2024-05-22 PROCEDURE — 3700000001 HC ADD 15 MINUTES (ANESTHESIA): Performed by: ORTHOPAEDIC SURGERY

## 2024-05-22 PROCEDURE — 7100000010 HC PHASE II RECOVERY - FIRST 15 MIN: Performed by: ORTHOPAEDIC SURGERY

## 2024-05-22 PROCEDURE — 7100000001 HC PACU RECOVERY - ADDTL 15 MIN: Performed by: ORTHOPAEDIC SURGERY

## 2024-05-22 PROCEDURE — 7100000011 HC PHASE II RECOVERY - ADDTL 15 MIN: Performed by: ORTHOPAEDIC SURGERY

## 2024-05-22 PROCEDURE — 2700000000 HC OXYGEN THERAPY PER DAY

## 2024-05-22 PROCEDURE — 3600000004 HC SURGERY LEVEL 4 BASE: Performed by: ORTHOPAEDIC SURGERY

## 2024-05-22 RX ORDER — SODIUM CHLORIDE 9 MG/ML
INJECTION, SOLUTION INTRAVENOUS CONTINUOUS PRN
Status: DISCONTINUED | OUTPATIENT
Start: 2024-05-22 | End: 2024-05-22 | Stop reason: SDUPTHER

## 2024-05-22 RX ORDER — HYDROCODONE BITARTRATE AND ACETAMINOPHEN 5; 325 MG/1; MG/1
2 TABLET ORAL ONCE
Status: COMPLETED | OUTPATIENT
Start: 2024-05-22 | End: 2024-05-22

## 2024-05-22 RX ORDER — BUPIVACAINE HYDROCHLORIDE 2.5 MG/ML
INJECTION, SOLUTION EPIDURAL; INFILTRATION; INTRACAUDAL PRN
Status: DISCONTINUED | OUTPATIENT
Start: 2024-05-22 | End: 2024-05-22 | Stop reason: ALTCHOICE

## 2024-05-22 RX ORDER — APREPITANT 40 MG/1
40 CAPSULE ORAL ONCE
Status: COMPLETED | OUTPATIENT
Start: 2024-05-22 | End: 2024-05-22

## 2024-05-22 RX ORDER — SODIUM CHLORIDE 0.9 % (FLUSH) 0.9 %
5-40 SYRINGE (ML) INJECTION EVERY 12 HOURS SCHEDULED
Status: DISCONTINUED | OUTPATIENT
Start: 2024-05-22 | End: 2024-05-22 | Stop reason: HOSPADM

## 2024-05-22 RX ORDER — ROCURONIUM BROMIDE 10 MG/ML
INJECTION, SOLUTION INTRAVENOUS PRN
Status: DISCONTINUED | OUTPATIENT
Start: 2024-05-22 | End: 2024-05-22 | Stop reason: SDUPTHER

## 2024-05-22 RX ORDER — MIDAZOLAM HYDROCHLORIDE 1 MG/ML
INJECTION INTRAMUSCULAR; INTRAVENOUS PRN
Status: DISCONTINUED | OUTPATIENT
Start: 2024-05-22 | End: 2024-05-22 | Stop reason: SDUPTHER

## 2024-05-22 RX ORDER — FAMOTIDINE 10 MG/ML
INJECTION, SOLUTION INTRAVENOUS PRN
Status: DISCONTINUED | OUTPATIENT
Start: 2024-05-22 | End: 2024-05-22 | Stop reason: SDUPTHER

## 2024-05-22 RX ORDER — APREPITANT 40 MG/1
CAPSULE ORAL
Status: COMPLETED
Start: 2024-05-22 | End: 2024-05-22

## 2024-05-22 RX ORDER — SODIUM CHLORIDE 0.9 % (FLUSH) 0.9 %
5-40 SYRINGE (ML) INJECTION PRN
Status: DISCONTINUED | OUTPATIENT
Start: 2024-05-22 | End: 2024-05-22 | Stop reason: HOSPADM

## 2024-05-22 RX ORDER — DEXAMETHASONE SODIUM PHOSPHATE 10 MG/ML
8 INJECTION, SOLUTION INTRAMUSCULAR; INTRAVENOUS ONCE
Status: COMPLETED | OUTPATIENT
Start: 2024-05-22 | End: 2024-05-22

## 2024-05-22 RX ORDER — IPRATROPIUM BROMIDE AND ALBUTEROL SULFATE 2.5; .5 MG/3ML; MG/3ML
1 SOLUTION RESPIRATORY (INHALATION)
Status: DISCONTINUED | OUTPATIENT
Start: 2024-05-22 | End: 2024-05-22 | Stop reason: HOSPADM

## 2024-05-22 RX ORDER — TRANEXAMIC ACID 650 MG/1
1950 TABLET ORAL
Status: DISCONTINUED | OUTPATIENT
Start: 2024-05-22 | End: 2024-05-22 | Stop reason: HOSPADM

## 2024-05-22 RX ORDER — IPRATROPIUM BROMIDE AND ALBUTEROL SULFATE 2.5; .5 MG/3ML; MG/3ML
SOLUTION RESPIRATORY (INHALATION)
Status: COMPLETED
Start: 2024-05-22 | End: 2024-05-22

## 2024-05-22 RX ORDER — SODIUM CHLORIDE 9 MG/ML
INJECTION, SOLUTION INTRAVENOUS PRN
Status: DISCONTINUED | OUTPATIENT
Start: 2024-05-22 | End: 2024-05-22 | Stop reason: HOSPADM

## 2024-05-22 RX ORDER — HYDROCODONE BITARTRATE AND ACETAMINOPHEN 5; 325 MG/1; MG/1
1-2 TABLET ORAL EVERY 6 HOURS PRN
Qty: 40 TABLET | Refills: 0 | Status: SHIPPED | OUTPATIENT
Start: 2024-05-22 | End: 2024-05-27

## 2024-05-22 RX ORDER — NALOXONE HYDROCHLORIDE 0.4 MG/ML
INJECTION, SOLUTION INTRAMUSCULAR; INTRAVENOUS; SUBCUTANEOUS PRN
Status: DISCONTINUED | OUTPATIENT
Start: 2024-05-22 | End: 2024-05-22 | Stop reason: HOSPADM

## 2024-05-22 RX ORDER — FENTANYL CITRATE 50 UG/ML
INJECTION, SOLUTION INTRAMUSCULAR; INTRAVENOUS PRN
Status: DISCONTINUED | OUTPATIENT
Start: 2024-05-22 | End: 2024-05-22 | Stop reason: SDUPTHER

## 2024-05-22 RX ORDER — PROPOFOL 10 MG/ML
INJECTION, EMULSION INTRAVENOUS PRN
Status: DISCONTINUED | OUTPATIENT
Start: 2024-05-22 | End: 2024-05-22 | Stop reason: SDUPTHER

## 2024-05-22 RX ORDER — ACETAMINOPHEN 500 MG
1000 TABLET ORAL ONCE
Status: COMPLETED | OUTPATIENT
Start: 2024-05-22 | End: 2024-05-22

## 2024-05-22 RX ORDER — FENTANYL CITRATE 50 UG/ML
25 INJECTION, SOLUTION INTRAMUSCULAR; INTRAVENOUS EVERY 5 MIN PRN
Status: DISCONTINUED | OUTPATIENT
Start: 2024-05-22 | End: 2024-05-22 | Stop reason: HOSPADM

## 2024-05-22 RX ORDER — ALBUTEROL SULFATE 90 UG/1
AEROSOL, METERED RESPIRATORY (INHALATION) PRN
Status: DISCONTINUED | OUTPATIENT
Start: 2024-05-22 | End: 2024-05-22 | Stop reason: SDUPTHER

## 2024-05-22 RX ORDER — FENTANYL CITRATE 50 UG/ML
50 INJECTION, SOLUTION INTRAMUSCULAR; INTRAVENOUS EVERY 5 MIN PRN
Status: DISCONTINUED | OUTPATIENT
Start: 2024-05-22 | End: 2024-05-22 | Stop reason: HOSPADM

## 2024-05-22 RX ORDER — SUCCINYLCHOLINE/SOD CL,ISO/PF 200MG/10ML
SYRINGE (ML) INTRAVENOUS PRN
Status: DISCONTINUED | OUTPATIENT
Start: 2024-05-22 | End: 2024-05-22 | Stop reason: SDUPTHER

## 2024-05-22 RX ORDER — MAGNESIUM HYDROXIDE 1200 MG/15ML
LIQUID ORAL CONTINUOUS PRN
Status: COMPLETED | OUTPATIENT
Start: 2024-05-22 | End: 2024-05-22

## 2024-05-22 RX ORDER — LIDOCAINE HYDROCHLORIDE 20 MG/ML
INJECTION, SOLUTION INTRAVENOUS PRN
Status: DISCONTINUED | OUTPATIENT
Start: 2024-05-22 | End: 2024-05-22 | Stop reason: SDUPTHER

## 2024-05-22 RX ORDER — SODIUM CHLORIDE 9 MG/ML
INJECTION, SOLUTION INTRAVENOUS CONTINUOUS
Status: DISCONTINUED | OUTPATIENT
Start: 2024-05-22 | End: 2024-05-22 | Stop reason: HOSPADM

## 2024-05-22 RX ADMIN — ALBUTEROL SULFATE 3 PUFF: 90 AEROSOL, METERED RESPIRATORY (INHALATION) at 09:27

## 2024-05-22 RX ADMIN — SUGAMMADEX 200 MG: 100 INJECTION, SOLUTION INTRAVENOUS at 09:33

## 2024-05-22 RX ADMIN — IPRATROPIUM BROMIDE AND ALBUTEROL SULFATE 3 ML: 2.5; .5 SOLUTION RESPIRATORY (INHALATION) at 08:04

## 2024-05-22 RX ADMIN — LIDOCAINE HYDROCHLORIDE 100 MG: 20 INJECTION, SOLUTION INTRAVENOUS at 08:43

## 2024-05-22 RX ADMIN — FENTANYL CITRATE 50 MCG: 50 INJECTION INTRAMUSCULAR; INTRAVENOUS at 10:40

## 2024-05-22 RX ADMIN — FAMOTIDINE 20 MG: 10 INJECTION, SOLUTION INTRAVENOUS at 08:46

## 2024-05-22 RX ADMIN — Medication 160 MG: at 08:48

## 2024-05-22 RX ADMIN — FENTANYL CITRATE 50 MCG: 50 INJECTION INTRAMUSCULAR; INTRAVENOUS at 10:00

## 2024-05-22 RX ADMIN — APREPITANT 40 MG: 40 CAPSULE ORAL at 08:18

## 2024-05-22 RX ADMIN — SODIUM CHLORIDE: 9 INJECTION, SOLUTION INTRAVENOUS at 08:38

## 2024-05-22 RX ADMIN — ACETAMINOPHEN 1000 MG: 500 TABLET ORAL at 07:38

## 2024-05-22 RX ADMIN — HYDROMORPHONE HYDROCHLORIDE 0.5 MG: 1 INJECTION, SOLUTION INTRAMUSCULAR; INTRAVENOUS; SUBCUTANEOUS at 10:13

## 2024-05-22 RX ADMIN — PROPOFOL 100 MG: 10 INJECTION, EMULSION INTRAVENOUS at 08:48

## 2024-05-22 RX ADMIN — SODIUM CHLORIDE: 9 INJECTION, SOLUTION INTRAVENOUS at 07:39

## 2024-05-22 RX ADMIN — HYDROMORPHONE HYDROCHLORIDE 0.5 MG: 1 INJECTION, SOLUTION INTRAMUSCULAR; INTRAVENOUS; SUBCUTANEOUS at 10:28

## 2024-05-22 RX ADMIN — ROCURONIUM BROMIDE 30 MG: 10 INJECTION, SOLUTION INTRAVENOUS at 08:55

## 2024-05-22 RX ADMIN — CEFAZOLIN 2000 MG: 2 INJECTION, POWDER, FOR SOLUTION INTRAMUSCULAR; INTRAVENOUS at 08:54

## 2024-05-22 RX ADMIN — FENTANYL CITRATE 100 MCG: 50 INJECTION INTRAMUSCULAR; INTRAVENOUS at 08:48

## 2024-05-22 RX ADMIN — TRANEXAMIC ACID 1950 MG: 650 TABLET ORAL at 07:38

## 2024-05-22 RX ADMIN — DEXAMETHASONE SODIUM PHOSPHATE 8 MG: 10 INJECTION, SOLUTION INTRAMUSCULAR; INTRAVENOUS at 07:39

## 2024-05-22 RX ADMIN — MIDAZOLAM 2 MG: 1 INJECTION INTRAMUSCULAR; INTRAVENOUS at 08:39

## 2024-05-22 RX ADMIN — HYDROCODONE BITARTRATE AND ACETAMINOPHEN 2 TABLET: 5; 325 TABLET ORAL at 11:18

## 2024-05-22 RX ADMIN — PROPOFOL 200 MG: 10 INJECTION, EMULSION INTRAVENOUS at 08:43

## 2024-05-22 ASSESSMENT — PAIN DESCRIPTION - ORIENTATION
ORIENTATION: RIGHT;POSTERIOR
ORIENTATION: RIGHT;POSTERIOR
ORIENTATION: RIGHT
ORIENTATION: RIGHT;POSTERIOR
ORIENTATION: RIGHT

## 2024-05-22 ASSESSMENT — PAIN DESCRIPTION - LOCATION
LOCATION: KNEE

## 2024-05-22 ASSESSMENT — PAIN SCALES - GENERAL
PAINLEVEL_OUTOF10: 9
PAINLEVEL_OUTOF10: 6
PAINLEVEL_OUTOF10: 7
PAINLEVEL_OUTOF10: 6
PAINLEVEL_OUTOF10: 8
PAINLEVEL_OUTOF10: 9

## 2024-05-22 ASSESSMENT — PAIN DESCRIPTION - DESCRIPTORS
DESCRIPTORS: SHARP
DESCRIPTORS: BURNING
DESCRIPTORS: SHARP
DESCRIPTORS: BURNING
DESCRIPTORS: ACHING
DESCRIPTORS: BURNING;POUNDING

## 2024-05-22 ASSESSMENT — PAIN DESCRIPTION - PAIN TYPE
TYPE: SURGICAL PAIN
TYPE: SURGICAL PAIN

## 2024-05-22 ASSESSMENT — LIFESTYLE VARIABLES: SMOKING_STATUS: 0

## 2024-05-22 NOTE — ANESTHESIA PRE PROCEDURE
[facility] Department of Anesthesiology  Pre-Anesthesia Evaluation/Consultation       Name:  Andria Mukherjee  : 1960  Age:  64 y.o.                                           MRN:  [id number]  Date: 2024           Surgeon: Surgeon(s):  Christopher Pinto MD    Procedure: Procedure(s):  RIGHT KNEE ARTHROSCOPIC PARTIAL MEDIAL MENISCECTOMY AND BAKERS CYST EXCISON     Allergies   Allergen Reactions   • Zofran [Ondansetron]      elongated qt wave   • Lisinopril Cough     Bronchial spasms / patient reported   • Naproxen      weakness   • Penicillins      Unknown    • Rofecoxib Other (See Comments)     Muscle weakness      Unknown   • Clindamycin Nausea And Vomiting and Other (See Comments)     Burning in abdomen   • Demerol Hcl [Meperidine] Rash   • Sulfa Antibiotics Nausea And Vomiting     Patient Active Problem List   Diagnosis   • Raynaud's disease   • Primary hypertension   • Fibromyalgia   • Hx of thyroid cancer   • Hypothyroidism, postsurgical   • Class 1 obesity with serious comorbidity and body mass index (BMI) of 32.0 to 32.9 in adult   • Prediabetes   • Pure hypercholesterolemia   • Mild persistent asthma without complication   • Mild episode of recurrent major depressive disorder (HCC)   • Cervicalgia   • Thyroid cancer (HCC)   • Elevated liver function tests   • Expressive aphasia   • Insomnia due to other mental disorder   • Acute medial meniscus tear of right knee   • Popliteal synovial cyst, right     Past Medical History:   Diagnosis Date   • Asthma    • Carpal tunnel syndrome    • Dental disease    • Dizziness    • Ganglion cyst    • GERD (gastroesophageal reflux disease)    • Headache    • Hypertension    • PONV (postoperative nausea and vomiting)    • Popliteal synovial cyst, right 2024   • Prediabetes 2022   • Sinusitis    • Thyroid cancer (HCC)    • Tonic pupil of right eye      Past Surgical History:   Procedure Laterality Date   • BREAST BIOPSY Right

## 2024-05-22 NOTE — INTERVAL H&P NOTE
Patient seen and examined.  I have reviewed the history and physical and examined the patient and find no relevant changes.  Surgery plan reviewed.    Site marked and consent verified.  All questions answered and antibiotic verified.    Ready for Right knee arthroscopy with partial medial meniscectomy and arthroscopic versus open debridement of Baker's cyst.          Christopher Pinto MD, FAAOS  Board Certified Orthopaedic Surgeon  [facility]  [place] & [place]    Phone:[phone]  Fax [phone]    05/22/24  7:06 AM

## 2024-05-22 NOTE — PROGRESS NOTES
Pt arrived from OR to PACU bay 3. Reported received from [facility] mandujano/sophia crna staff. Patient resting comfortably. Surgical incisions  clean and dry, dressing in place to right lower extremity. Pt on 2L nasal Cannula, sinus tach, and VSS. Will continue to monitor.

## 2024-05-22 NOTE — ANESTHESIA POSTPROCEDURE EVALUATION
Department of Anesthesiology  Postprocedure Note    Patient: Andria Mukherjee  MRN: [id number]  YOB: 1960  Date of evaluation: 5/22/2024    Procedure Summary       Date: 05/22/24 Room / Location: [address]    Anesthesia Start: 0839 Anesthesia Stop: 0952    Procedure: RIGHT KNEE ARTHROSCOPIC PARTIAL MEDIAL MENISCECTOMY AND BAKERS CYST EXCISON (Right) Diagnosis:       Acute medial meniscus tear of right knee, subsequent encounter      Popliteal synovial cyst, right      (Acute medial meniscus tear of right knee, subsequent encounter [S83.241D])      (Popliteal synovial cyst, right [M71.21])    Surgeons: Christopher Pinto MD Responsible Provider: Jerome Day MD    Anesthesia Type: general ASA Status: 3            Anesthesia Type: No value filed.    Mingo Phase I: Mingo Score: 10    Mingo Phase II: Mingo Score: 10    Anesthesia Post Evaluation    Patient location during evaluation: PACU  Patient participation: complete - patient participated  Level of consciousness: awake and alert  Pain score: 2  Airway patency: patent  Nausea & Vomiting: no nausea and no vomiting  Cardiovascular status: blood pressure returned to baseline  Respiratory status: acceptable  Hydration status: euvolemic  Pain management: adequate    No notable events documented.

## 2024-05-22 NOTE — PROGRESS NOTES
Discharge instructions review with patient and , Joon. All home medications have been reviewed, pt v/u.

## 2024-05-22 NOTE — PROGRESS NOTES
Pt awake and alert at this time. Pt on RA, and VSS. Pt having tolerable painp and denies nausea, tolerating PO.  Skin warm, palpable pulses and able to wiggle toes. Pt meets criteria to be discharged from Phase 1.

## 2024-05-22 NOTE — PROGRESS NOTES
Pt having asthmatic attack; Dr Pinto and Dr. Day at the bedside at this time. Pt was able to administer her own inhaler from home; asthmatic attack relieved. Duoneb also ordered.

## 2024-05-23 NOTE — BRIEF OP NOTE
Brief Postoperative Note      Patient: Andria Mukherjee  YOB: 1960  MRN: [id number]    Date of Procedure: 5/22/2024    Pre-Op Diagnosis Codes:     * Acute medial meniscus tear of right knee, subsequent encounter [S83.241D]     * Popliteal synovial cyst, right [M71.21]    Post-Op Diagnosis: Same       Procedure(s):  RIGHT KNEE ARTHROSCOPIC PARTIAL MEDIAL MENISCECTOMY AND BAKERS CYST EXCISON    Surgeon(s):  Luci Hoyt MD    Assistant:  Surgical Assistant: Derek Moreira    Anesthesia: General    Estimated Blood Loss (mL): less than 50     Complications: None    Specimens:   * No specimens in log *    Implants:  * No implants in log *      Drains: * No LDAs found *    Findings:  Infection Present At Time Of Surgery (PATOS) (choose all levels that have infection present):  No infection present  Other Findings: radial tear of posterior horn near the junction with the root of the medial meniscus.  Grade 3 medial and patellofemoral compartment chondral damage, baker's cyst debrided through separate open approach posteromedially.    Electronically signed by LUCI HOYT MD on 5/23/2024 at 7:02 AM

## 2024-05-23 NOTE — OP NOTE
of the meniscal volume.  Unfortunately, due to the severity of her compartment chondral damage repair was not indicated.  Light abrasion chondroplasty was performed in the medial compartment to remove any loose flaps of cartilage from the articular surface with a 3.5 mm motorized shaver.    The intercondylar notch was examined and showed Intact ACL and PCL fibers.  Gilquist maneuver was performed showing no loose bodies posteriorly.  Examination of the posterior capsular fold showed no cystic fluid that could be compressed from outside end.  The lateral compartment was entered and the meniscus was probed.  No significant tear was noted.  The lateral femoral condyle and tibial cartilages showed no significant defects.  The medial and lateral gutters were checked for loose bodies and none were noted.  The instruments and arthroscope were returned to the suprapatellar pouch.   Abrasion chondroplasty on the patellar cartilage damage was completed also with 3.5 mm motorized shaver to a rim of stable tissue.    The knee was thoroughly irrigated.  Instruments and arthroscope were removed.    Attention was turned to the palpable posterior medial Baker's cyst.  A 4 cm incision was created with a #10 blade for medial approach.  The division between the medial hamstrings and the gastroc muscle were identified.  Careful probing bluntly showed the Baker's cyst and its location comparable to findings on the MRI overlying the posterior medial gastroc.  The cyst was opened with Metzenbaum scissors and drained.  Debridement with rongeur was completed for the portion of the cyst under direct visualization.   The wound was thoroughly irrigated.  Skin was closed with interrupted 3-0 Vicryl and running 4-0 Monocryl.     Portal sites were closed with 4-0 Monocryl and covered with Steri-Strips.  30 mL of 0.25 percent Marcaine plain were instilled for postoperative analgesia.  Dry sterile dressings were applied.  The limb was wrapped from

## 2024-06-02 NOTE — PROGRESS NOTES
Andria Mukherjee  [id number]  Encounter Date: 6/3/2024  YOB: 1960    Chief Complaint   Patient presents with    Post-Op Check     RT knee 5/22/2024       History:Ms. Andria Mukherjee is here for 2 week follow-up regarding her right knee scope, PMM and open debridement of Baker's cyst.  Pain averages 2/10.  Denies fevers, chills or wound drainage.  She is ambulating without any adaptive devices and performing her home exercise program.  Plan is for her to return to work 6 weeks postop.    Exam:  General: Alert and oriented x3, No acute distress, Cooperative and conversant.  Mood and affect are appropriate.  Right knee: Portal sites and medial incision well-healed.  Steri-Strips in place.  No erythema.  She has full extension with flexion comfortable Lon on 125 degrees in the seated position.  No significant tenderness along the joint line.  Knee strength flexion extension 5/5.  Sensation to light touch grossly present throughout the right lower extremity  Capillary refill < 2 seconds   Skin: no erythema, lesions or rashes  No signs / symptoms of DVT / PE or infection    Assessment:   Diagnosis Orders   1. S/P arthroscopic surgery of right knee          Plan:  We discussed treatment options today.  She will continue increasing her activities as she tolerates.  She will continue working through her strengthening program.  Will plan for her to return to work 6 weeks postop.  I will see her back in 3 weeks time to check her progress just before she returns to full duty.    She understands and accepts this course of care.      Christopher Pinto MD, FAAOS  Board Certified Orthopaedic Surgeon  [facility]  [place] & [place]    Phone:[phone]  Fax [phone]    06/03/24  10:07 AM    Documentation was done using voice recognition dragon software.  Every effort was made to ensure accuracy; however, inadvertent  Unintentional computerized transcription errors may be

## 2024-06-03 ENCOUNTER — OFFICE VISIT (OUTPATIENT)
Age: 64
End: 2024-06-03

## 2024-06-03 VITALS — WEIGHT: 188 LBS | HEIGHT: 64 IN | BODY MASS INDEX: 32.1 KG/M2

## 2024-06-03 DIAGNOSIS — Z98.890 S/P ARTHROSCOPIC SURGERY OF RIGHT KNEE: Primary | ICD-10-CM

## 2024-06-03 PROCEDURE — 99024 POSTOP FOLLOW-UP VISIT: CPT | Performed by: ORTHOPAEDIC SURGERY

## 2024-06-06 NOTE — PROGRESS NOTES
2024    TELEHEALTH EVALUATION -- Audio/Visual    HPI:    Andria Mukherjee (:  1960) has requested an audio/video evaluation for the following concern(s):    Pre-op review.  She has no new injury.  She is ready for her up coming right knee surgery.  She completed her pre-op lab work and her H&P.      Prior to Visit Medications    Medication Sig Taking? Authorizing Provider   losartan (COZAAR) 25 MG tablet Take 1 tablet by mouth daily  Ban Berg MD   amLODIPine (NORVASC) 10 MG tablet Take 1 tablet by mouth daily  Ban Berg MD   DULoxetine (CYMBALTA) 30 MG extended release capsule Take 2 capsules by mouth daily  Monica Ni MD   diazePAM (VALIUM) 2 MG tablet Take 1 tablet by mouth nightly as needed.  Monica Ni MD   levothyroxine (SYNTHROID) 175 MCG tablet Take 1 tablet by mouth Daily  Celia Swartz MD   Coenzyme Q10 (COQ10) 100 MG CAPS   Monica Ni MD   Riboflavin 100 MG CAPS   Monica Ni MD   montelukast (SINGULAIR) 10 MG tablet TAKE ONE TABLET BY MOUTH NIGHTLY  Ban Berg MD   omeprazole (PRILOSEC) 40 MG delayed release capsule Take 1 capsule by mouth daily  Ban Berg MD   gabapentin (NEURONTIN) 300 MG capsule Take 1 capsule by mouth 3 times daily as needed.  Monica Ni MD   fluticasone (FLONASE) 50 MCG/ACT nasal spray 2 sprays by Nasal route daily  Guy Decker DO   azelastine (ASTELIN) 0.1 % nasal spray 1 spray by Nasal route 2 times daily Use in each nostril as directed  Guy Decker DO   meloxicam (MOBIC) 15 MG tablet Take 1 tablet by mouth daily  Monica Ni MD   lidocaine (LIDODERM) 5 % Place 1 patch onto the skin daily 12 hours on, 12 hours off.  Amber Navarrete MD   APPLE CIDER VINEGAR PO Take by mouth  Monica Ni MD   Nutritional Supplements (ESTROVEN NIGHTTIME PO) Take by mouth  Monica Ni MD   budesonide-formoterol (SYMBICORT) 160-4.5 MCG/ACT AERO Inhale 2 puffs into the lungs

## 2024-06-14 ENCOUNTER — TELEPHONE (OUTPATIENT)
Dept: PRIMARY CARE CLINIC | Age: 64
End: 2024-06-14

## 2024-06-14 NOTE — TELEPHONE ENCOUNTER
Outreach call / Plastic Surgery Referral    Working on [employer] need information to close referral    Please obtain information and reroute this message to the  staff:     Facility:  Location:   Provider:  Phone number:  DOS:

## 2024-06-26 ENCOUNTER — OFFICE VISIT (OUTPATIENT)
Age: 64
End: 2024-06-26

## 2024-06-26 VITALS — HEIGHT: 64 IN | BODY MASS INDEX: 32.1 KG/M2 | WEIGHT: 188 LBS

## 2024-06-26 DIAGNOSIS — Z98.890 S/P ARTHROSCOPIC SURGERY OF RIGHT KNEE: Primary | ICD-10-CM

## 2024-06-26 PROCEDURE — 99024 POSTOP FOLLOW-UP VISIT: CPT | Performed by: ORTHOPAEDIC SURGERY

## 2024-06-26 NOTE — PROGRESS NOTES
Andria Mukherjee  [id number]  Encounter Date: 6/26/2024  YOB: 1960    Chief Complaint   Patient presents with    Post-Op Check     S/p (R) knee scope 5/22/24       History:Ms. Andria Mukherjee is here in follow up regarding her right knee status post arthroscopy with partial medial meniscectomy and removal of Baker's cyst.  She reports she is doing well today.  She not having any pain in the knee.  She feels like she will be ready to return to work as planned next Monday.  Overall, she is quite pleased with her results and feels that her strength and activity endurance are returning as expected.    Exam:  Ht 1.626 m (5' 4\")   Wt 85.3 kg (188 lb)   LMP 02/10/2003   BMI 32.27 kg/m²   General: Alert and oriented x3, No acute distress, Cooperative and conversant.  Mood and affect are appropriate.  Right knee: Portal sites and incision are well-healed.  No joint effusion.  She has full extension with flexion comfortable beyond 20 degrees.  No tenderness along the joint line.  Gait: She ambulates with even, tandem gait and no limp.  Gross motor function light touch sensation intact throughout the right lower extremity.    Assessment:   Diagnosis Orders   1. S/P arthroscopic surgery of right knee          Plan:  We discussed treatment options today.  She is doing well.  She will continue with her strengthening activities using her home exercise program and plan on returning to work July 1.  She will follow back with me on an as-needed basis.    She understands and accepts this course of care.      Christopher Pinto MD, FAAOS  Board Certified Orthopaedic Surgeon  [facility]  [facility]    Phone:[phone]  Fax [phone]    06/26/24  4:01 PM    Documentation was done using voice recognition dragon software.  Every effort was made to ensure accuracy; however, inadvertent  Unintentional computerized transcription errors may be present.

## 2024-07-08 ENCOUNTER — HOSPITAL ENCOUNTER (EMERGENCY)
Age: 64
Discharge: HOME | End: 2024-07-08
Attending: EMERGENCY MEDICINE
Payer: COMMERCIAL

## 2024-07-08 ENCOUNTER — TELEPHONE (OUTPATIENT)
Dept: ORTHOPEDIC SURGERY | Age: 64
End: 2024-07-08

## 2024-07-08 ENCOUNTER — TRANSCRIBE ORDERS (OUTPATIENT)
Dept: ADMINISTRATIVE | Age: 64
End: 2024-07-08

## 2024-07-08 ENCOUNTER — HOSPITAL ENCOUNTER (OUTPATIENT)
Dept: VASCULAR LAB | Age: 64
Discharge: HOME | End: 2024-07-10
Attending: ORTHOPAEDIC SURGERY
Payer: COMMERCIAL

## 2024-07-08 VITALS
BODY MASS INDEX: 32.1 KG/M2 | HEIGHT: 64 IN | HEART RATE: 85 BPM | OXYGEN SATURATION: 91 % | SYSTOLIC BLOOD PRESSURE: 173 MMHG | WEIGHT: 188 LBS | DIASTOLIC BLOOD PRESSURE: 102 MMHG | RESPIRATION RATE: 18 BRPM

## 2024-07-08 DIAGNOSIS — M79.661 PAIN OF RIGHT LOWER LEG: ICD-10-CM

## 2024-07-08 DIAGNOSIS — M79.89 SWELLING OF LIMB: Primary | ICD-10-CM

## 2024-07-08 DIAGNOSIS — M79.661 PAIN AND SWELLING OF RIGHT LOWER LEG: ICD-10-CM

## 2024-07-08 DIAGNOSIS — I82.4Z1 ACUTE DEEP VEIN THROMBOSIS (DVT) OF DISTAL VEIN OF RIGHT LOWER EXTREMITY (HCC): Primary | ICD-10-CM

## 2024-07-08 DIAGNOSIS — M79.89 SWELLING OF LIMB: ICD-10-CM

## 2024-07-08 DIAGNOSIS — M79.661 PAIN AND SWELLING OF RIGHT LOWER LEG: Primary | ICD-10-CM

## 2024-07-08 DIAGNOSIS — M79.89 PAIN AND SWELLING OF RIGHT LOWER LEG: ICD-10-CM

## 2024-07-08 DIAGNOSIS — M79.89 PAIN AND SWELLING OF RIGHT LOWER LEG: Primary | ICD-10-CM

## 2024-07-08 PROCEDURE — 99283 EMERGENCY DEPT VISIT LOW MDM: CPT

## 2024-07-08 PROCEDURE — 93971 EXTREMITY STUDY: CPT

## 2024-07-08 RX ORDER — HYDROCODONE BITARTRATE AND ACETAMINOPHEN 5; 325 MG/1; MG/1
1 TABLET ORAL EVERY 6 HOURS PRN
Qty: 10 TABLET | Refills: 0 | Status: SHIPPED | OUTPATIENT
Start: 2024-07-08 | End: 2024-07-11

## 2024-07-08 ASSESSMENT — LIFESTYLE VARIABLES
HOW OFTEN DO YOU HAVE A DRINK CONTAINING ALCOHOL: 4 OR MORE TIMES A WEEK
HOW MANY STANDARD DRINKS CONTAINING ALCOHOL DO YOU HAVE ON A TYPICAL DAY: 1 OR 2

## 2024-07-08 ASSESSMENT — PAIN DESCRIPTION - ORIENTATION: ORIENTATION: RIGHT

## 2024-07-08 ASSESSMENT — PAIN - FUNCTIONAL ASSESSMENT: PAIN_FUNCTIONAL_ASSESSMENT: 0-10

## 2024-07-08 ASSESSMENT — PAIN DESCRIPTION - LOCATION: LOCATION: LEG

## 2024-07-08 NOTE — ED PROVIDER NOTES
THE [facility]  EMERGENCY DEPARTMENT ENCOUNTER          ATTENDING PHYSICIAN NOTE       Date of evaluation: 7/8/2024    Chief Complaint     Leg Pain (Patient had knee sx on 6/22 and has had pain and swelling in the right leg, she had an ultrasound done today that confirmed a DVT. )      History of Present Illness     Andria Mukherjee is a 64 y.o. female who presents to the emergency department the complaint of right lower extremity pain and swelling.  The patient is status post right arthroscopic knee surgery with Baker's cyst removal and over the course the past several days after returning to work has begun having right lower extremity swelling and pain despite wearing compression stockings.  Communicated this with the orthopedic surgeon having taken care of her and they ordered a right lower extremity venous duplex to be done today.  She was found to have DVTs as described below:  Common Femoral Vein: Normal phasicity and fully compressible.   Greater Saphenous Vein: Normal phasicity and fully compressible.   Small Saphenous Vein: Fully compressible.   Profunda Femoral Vein: Normal phasicity and fully compressible.   Proximal Femoral Vein: Normal phasicity and fully compressible.   Middle Femoral Vein: Fully compressible.   Distal Femoral Vein: Fully compressible.   Popliteal Vein: Normal phasicity and fully compressible.   Gastrocnemius Vein: Fully compressible.   Soleal Vein: Acute occlusive thrombus.   Posterior Tibial Vein: Fully compressible.   Peroneal Vein: Acute occlusive thrombus.        ASSESSMENT / PLAN  (MEDICAL DECISION MAKING)     INITIAL VITALS: BP: (!) 192/108,  , Pulse: 85, Respirations: 18, SpO2: 98 %      Andria Mukherjee is a 64 y.o. female who presented to the Emergency Department with concerns for right lower extremity DVT.  The patient reports no new symptoms of shortness of breath chest pain.  Does have a family history of thromboembolic disease but no personal history of

## 2024-07-08 NOTE — TELEPHONE ENCOUNTER
General Question     Subject: POST OP PROBLEM   Patient and /or Facility Request: Andria Mukherjee   Contact Number: [phone]       PT CALLING IN REGARDING THAT SHE HAD SX ON 5- BY ABBOTT ON HER RT KNEE.     PT HAVE BEEN EXPERIENCING SWELLING IN THE RIGHT LEG AND SHARP PAIN IN LT CALF LEVEL OF 8.   PT STATES THAT THIS HAS STARTED AOUT 1-2 DAYS AGO.     PT SPENT ALL SUNDAY ELEVATING HER LT LEG.    REACHED OUT TO TRIAGE

## 2024-07-08 NOTE — TELEPHONE ENCOUNTER
Call Transferred      S/p Rt knee VA 5/22. EXPERIENCING SWELLING IN THE RIGHT LEG AND SHARP PAIN IN LT CALF LEVEL OF 8.   PT STATES THAT THIS HAS STARTED AOUT 1-2 DAYS AGO. CALF IS SORE TO TOUCH, AND IS WARM. NO FEVER. SWELLING DOWN TO ANKLE      REACHING OUT TO CLINIC

## 2024-07-08 NOTE — TELEPHONE ENCOUNTER
Called patient. Patient is having all the signs of a DVT - asked  On call what to do - PA and ATC recommend a STAT doppler for her leg. Called patient pateint understands this and will call central scheduling to schedule the test

## 2024-07-09 PROCEDURE — 93971 EXTREMITY STUDY: CPT | Performed by: SURGERY

## 2024-07-11 ENCOUNTER — HOSPITAL ENCOUNTER (OUTPATIENT)
Age: 64
Discharge: HOME | End: 2024-07-11
Payer: COMMERCIAL

## 2024-07-11 DIAGNOSIS — R47.01 EXPRESSIVE APHASIA: ICD-10-CM

## 2024-07-11 DIAGNOSIS — Z78.0 MENOPAUSE: ICD-10-CM

## 2024-07-11 DIAGNOSIS — E89.0 HYPOTHYROIDISM, POSTSURGICAL: ICD-10-CM

## 2024-07-11 DIAGNOSIS — R79.89 ELEVATED LIVER FUNCTION TESTS: ICD-10-CM

## 2024-07-11 DIAGNOSIS — C73 THYROID CANCER (HCC): ICD-10-CM

## 2024-07-11 LAB
ALBUMIN SERPL-MCNC: 4.8 G/DL (ref 3.4–5)
ALBUMIN/GLOB SERPL: 1.7 {RATIO} (ref 1.1–2.2)
ALP SERPL-CCNC: 133 U/L (ref 40–129)
ALT SERPL-CCNC: 84 U/L (ref 10–40)
ANION GAP SERPL CALCULATED.3IONS-SCNC: 20 MMOL/L (ref 3–16)
ANTI-THYROGLOB ABS: 14 IU/ML
AST SERPL-CCNC: 86 U/L (ref 15–37)
BILIRUB SERPL-MCNC: 0.3 MG/DL (ref 0–1)
BUN SERPL-MCNC: 16 MG/DL (ref 7–20)
CALCIUM SERPL-MCNC: 9.8 MG/DL (ref 8.3–10.6)
CHLORIDE SERPL-SCNC: 98 MMOL/L (ref 99–110)
CO2 SERPL-SCNC: 21 MMOL/L (ref 21–32)
CREAT SERPL-MCNC: 0.6 MG/DL (ref 0.6–1.2)
FOLATE SERPL-MCNC: 10.24 NG/ML (ref 4.78–24.2)
GFR SERPLBLD CREATININE-BSD FMLA CKD-EPI: >90 ML/MIN/{1.73_M2}
GLUCOSE SERPL-MCNC: 115 MG/DL (ref 70–99)
POTASSIUM SERPL-SCNC: 5.1 MMOL/L (ref 3.5–5.1)
PROT SERPL-MCNC: 7.7 G/DL (ref 6.4–8.2)
SODIUM SERPL-SCNC: 139 MMOL/L (ref 136–145)
T3FREE SERPL-MCNC: 2.6 PG/ML (ref 2.3–4.2)
T4 FREE SERPL-MCNC: 1.4 NG/DL (ref 0.9–1.8)
TSH SERPL DL<=0.005 MIU/L-ACNC: 0.8 UIU/ML (ref 0.27–4.2)
VIT B12 SERPL-MCNC: 632 PG/ML (ref 211–911)

## 2024-07-11 PROCEDURE — 76536 US EXAM OF HEAD AND NECK: CPT

## 2024-07-16 LAB — THYROGLOB SERPL-MCNC: <0.5 NG/ML (ref 1.3–31.8)

## 2024-07-18 ENCOUNTER — OFFICE VISIT (OUTPATIENT)
Dept: ENDOCRINOLOGY | Age: 64
End: 2024-07-18
Payer: COMMERCIAL

## 2024-07-18 VITALS
SYSTOLIC BLOOD PRESSURE: 153 MMHG | OXYGEN SATURATION: 100 % | BODY MASS INDEX: 33.8 KG/M2 | HEIGHT: 64 IN | RESPIRATION RATE: 14 BRPM | DIASTOLIC BLOOD PRESSURE: 91 MMHG | TEMPERATURE: 98 F | HEART RATE: 85 BPM | WEIGHT: 198 LBS

## 2024-07-18 DIAGNOSIS — R79.89 ELEVATED LIVER FUNCTION TESTS: ICD-10-CM

## 2024-07-18 DIAGNOSIS — E89.0 HYPOTHYROIDISM, POSTSURGICAL: Primary | ICD-10-CM

## 2024-07-18 DIAGNOSIS — C73 THYROID CANCER (HCC): ICD-10-CM

## 2024-07-18 DIAGNOSIS — E66.9 CLASS 1 OBESITY WITH SERIOUS COMORBIDITY AND BODY MASS INDEX (BMI) OF 33.0 TO 33.9 IN ADULT, UNSPECIFIED OBESITY TYPE: ICD-10-CM

## 2024-07-18 DIAGNOSIS — R47.01 EXPRESSIVE APHASIA: ICD-10-CM

## 2024-07-18 DIAGNOSIS — Z78.0 MENOPAUSE: ICD-10-CM

## 2024-07-18 PROBLEM — E66.811 CLASS 1 OBESITY WITH BODY MASS INDEX (BMI) OF 32.0 TO 32.9 IN ADULT: Status: ACTIVE | Noted: 2024-07-18

## 2024-07-18 PROCEDURE — 3080F DIAST BP >= 90 MM HG: CPT | Performed by: INTERNAL MEDICINE

## 2024-07-18 PROCEDURE — 99214 OFFICE O/P EST MOD 30 MIN: CPT | Performed by: INTERNAL MEDICINE

## 2024-07-18 PROCEDURE — 3077F SYST BP >= 140 MM HG: CPT | Performed by: INTERNAL MEDICINE

## 2024-07-18 RX ORDER — LEVOTHYROXINE SODIUM 175 UG/1
175 TABLET ORAL DAILY
Qty: 90 TABLET | Refills: 1 | Status: SHIPPED | OUTPATIENT
Start: 2024-07-18

## 2024-07-18 NOTE — PROGRESS NOTES
SUBJECTIVE:  Andria Mukherjee is a 64 y.o. female who is being evaluated for hypothyroidism.     1. Hypothyroidism, postsurgical  This started in 2015. Patient was diagnosed with thyroid cancer, postsurgical hypothyroidism. The problem has been unchanged. Previous thyroid studies include: TSH and free thyroxine.   Patient started medication in 2015. Currently patient is on: levothyroxine. Misses  0 doses a month.    Current complaints: denies fatigue, weight changes, heat intolerance, bowel/skin changes.   Has Raynaud's. Has cold sensitivity.  Has nail changes, dry skin.  Surgeon Dr. Lemus.  Not followed by him, [employer] insurance does not cover.  No I-131 Tx    2. Thyroid cancer (HCC)  No swelling or lump sensation in the neck area    2/13/2015 Operations/Procedures: Total thyroidectomy, central neck dissection 2/13/15    Doing well s/p total+CND for encapsulated FV PTC [id number] (0/2 LN)  LT4, TSH/Tg/renal today, GOVEA +/- but unlikely unless Tg elevated  Clinical History:    Pre-Operative Diagnosis: Follicular variant of papillary thyroid Ca, right    thyroid nodule as [facility] FNA cytology [id number].    Post-Operative Diagnosis:     Same    Specimen(s) Submitted:   A. Total Thyroid; B. Right Central Node Dissection        CPT Code(s):   88307 X 2    Office Info:   [id number]        FINAL DIAGNOSIS:        A.  Thyroid, total thyroidectomy:    - Papillary carcinoma, right lobe, completely excised; see synoptic report.        B.  Right central node dissection:    - Two lymph nodes negative for metastatic carcinoma; see synoptic report.        SYNOPTIC REPORT    THYROID GLAND: Resection        Procedure:  Total thyroidectomy        Lymph Node Sampling:  Right central node dissection        Tumor Focality:  Unifocal        Tumor Laterality:  Right lobe        Tumor Size:  Greatest dimension: 1.4 cm (gross examination)        Histologic Type:  Papillary carcinoma, follicular variant, encapsulated,    without

## 2024-07-22 ENCOUNTER — OFFICE VISIT (OUTPATIENT)
Age: 64
End: 2024-07-22
Payer: COMMERCIAL

## 2024-07-22 VITALS
SYSTOLIC BLOOD PRESSURE: 154 MMHG | BODY MASS INDEX: 33.61 KG/M2 | DIASTOLIC BLOOD PRESSURE: 96 MMHG | HEART RATE: 89 BPM | WEIGHT: 196.87 LBS | HEIGHT: 64 IN | OXYGEN SATURATION: 96 %

## 2024-07-22 DIAGNOSIS — J45.40 ASTHMA, MODERATE PERSISTENT, POORLY-CONTROLLED: ICD-10-CM

## 2024-07-22 DIAGNOSIS — R06.09 DOE (DYSPNEA ON EXERTION): Primary | ICD-10-CM

## 2024-07-22 PROCEDURE — 3077F SYST BP >= 140 MM HG: CPT | Performed by: INTERNAL MEDICINE

## 2024-07-22 PROCEDURE — 99204 OFFICE O/P NEW MOD 45 MIN: CPT | Performed by: INTERNAL MEDICINE

## 2024-07-22 PROCEDURE — 3079F DIAST BP 80-89 MM HG: CPT | Performed by: INTERNAL MEDICINE

## 2024-07-22 RX ORDER — LEVALBUTEROL TARTRATE 45 UG/1
2 AEROSOL, METERED ORAL EVERY 4 HOURS PRN
Qty: 2 EACH | Refills: 11 | Status: SHIPPED | OUTPATIENT
Start: 2024-07-22 | End: 2025-07-22

## 2024-07-22 RX ORDER — PREDNISONE 20 MG/1
40 TABLET ORAL DAILY
Qty: 14 TABLET | Refills: 0 | Status: SHIPPED | OUTPATIENT
Start: 2024-07-22

## 2024-07-22 RX ORDER — BUDESONIDE AND FORMOTEROL FUMARATE DIHYDRATE 160; 4.5 UG/1; UG/1
2 AEROSOL RESPIRATORY (INHALATION) 2 TIMES DAILY
Qty: 3 EACH | Refills: 3 | Status: SHIPPED | OUTPATIENT
Start: 2024-07-22

## 2024-07-22 NOTE — PROGRESS NOTES
/ reports were reviewed as a part of this visit.  Chest started November 6, 2019 reveals the following:  IMPRESSION:   No findings to suggest an acute cardiopulmonary process.     PFTs:  5/20/2016    PULMONARY FUNCTION TEST INTERPRETATION    Respiratory therapy comments: The patient's efforts were   maximal and consistent. Results were reproducible.     Spirometric flow volume loop: appears to show an obstructive   defect.    Spirometry:  Spirometry shows mild obstructive defect.  There is a significant bronchodilator response.    Lung Volumes:  Lung Volumes are normal.     Diffusion Capacity for Carbon Monoxide:  Diffusing capacity is elevated and may suggest asthma.    Additional comments:   No previous PFTs available for comparison.       Assessment:      Diagnosis Orders   1. COLEMAN (dyspnea on exertion)  XR CHEST (2 VW)      2. Asthma, moderate persistent, poorly-controlled            Plan:   Obtain chest x-ray  2.   Will hold on PFTs given history is quite consistent with asthma and it appears to be suboptimally controlled  3.  Will give prednisone 40 mg daily x 7 days  4.  Start Xopenex MDI 2 puffs every 4 hours as needed for dyspnea or wheeze  5.  Start Symbicort 160 mcg MDI 2 puffs twice daily.  6.  Follow-up for reevaluation in 5 weeks

## 2024-08-01 ENCOUNTER — OFFICE VISIT (OUTPATIENT)
Dept: PRIMARY CARE CLINIC | Age: 64
End: 2024-08-01
Payer: COMMERCIAL

## 2024-08-01 VITALS
DIASTOLIC BLOOD PRESSURE: 80 MMHG | HEART RATE: 88 BPM | OXYGEN SATURATION: 95 % | BODY MASS INDEX: 33.68 KG/M2 | RESPIRATION RATE: 20 BRPM | TEMPERATURE: 97.5 F | SYSTOLIC BLOOD PRESSURE: 130 MMHG | WEIGHT: 196.2 LBS

## 2024-08-01 DIAGNOSIS — I82.401 ACUTE DEEP VEIN THROMBOSIS (DVT) OF RIGHT LOWER EXTREMITY, UNSPECIFIED VEIN (HCC): Primary | ICD-10-CM

## 2024-08-01 PROCEDURE — 99214 OFFICE O/P EST MOD 30 MIN: CPT | Performed by: FAMILY MEDICINE

## 2024-08-01 PROCEDURE — 3075F SYST BP GE 130 - 139MM HG: CPT | Performed by: FAMILY MEDICINE

## 2024-08-01 PROCEDURE — 3079F DIAST BP 80-89 MM HG: CPT | Performed by: FAMILY MEDICINE

## 2024-08-01 ASSESSMENT — PATIENT HEALTH QUESTIONNAIRE - PHQ9
7. TROUBLE CONCENTRATING ON THINGS, SUCH AS READING THE NEWSPAPER OR WATCHING TELEVISION: NOT AT ALL
1. LITTLE INTEREST OR PLEASURE IN DOING THINGS: SEVERAL DAYS
SUM OF ALL RESPONSES TO PHQ QUESTIONS 1-9: 1
10. IF YOU CHECKED OFF ANY PROBLEMS, HOW DIFFICULT HAVE THESE PROBLEMS MADE IT FOR YOU TO DO YOUR WORK, TAKE CARE OF THINGS AT HOME, OR GET ALONG WITH OTHER PEOPLE: NOT DIFFICULT AT ALL
6. FEELING BAD ABOUT YOURSELF - OR THAT YOU ARE A FAILURE OR HAVE LET YOURSELF OR YOUR FAMILY DOWN: NOT AT ALL
2. FEELING DOWN, DEPRESSED OR HOPELESS: NOT AT ALL
4. FEELING TIRED OR HAVING LITTLE ENERGY: NOT AT ALL
9. THOUGHTS THAT YOU WOULD BE BETTER OFF DEAD, OR OF HURTING YOURSELF: NOT AT ALL
5. POOR APPETITE OR OVEREATING: NOT AT ALL
SUM OF ALL RESPONSES TO PHQ9 QUESTIONS 1 & 2: 1
SUM OF ALL RESPONSES TO PHQ QUESTIONS 1-9: 1
SUM OF ALL RESPONSES TO PHQ QUESTIONS 1-9: 1
8. MOVING OR SPEAKING SO SLOWLY THAT OTHER PEOPLE COULD HAVE NOTICED. OR THE OPPOSITE, BEING SO FIGETY OR RESTLESS THAT YOU HAVE BEEN MOVING AROUND A LOT MORE THAN USUAL: NOT AT ALL
SUM OF ALL RESPONSES TO PHQ QUESTIONS 1-9: 1
3. TROUBLE FALLING OR STAYING ASLEEP: NOT AT ALL

## 2024-08-01 NOTE — PROGRESS NOTES
Cuff Size: Medium Adult   Pulse: 88   Resp: 20   Temp: 97.5 °F (36.4 °C)   SpO2: 95%   Weight: 89 kg (196 lb 3.2 oz)      Body mass index is 33.68 kg/m².   Physical Exam  Constitutional:       Appearance: Normal appearance.   HENT:      Head: Normocephalic and atraumatic.   Cardiovascular:      Rate and Rhythm: Normal rate and regular rhythm.      Heart sounds: Normal heart sounds.   Pulmonary:      Breath sounds: Normal breath sounds.   Musculoskeletal:      Right lower leg: No edema.      Left lower leg: No edema.      Comments: Well-healed surgical scar at right knee   Neurological:      General: No focal deficit present.      Mental Status: She is alert and oriented to person, place, and time.   Psychiatric:         Attention and Perception: Attention and perception normal.         Mood and Affect: Mood and affect normal.         Speech: Speech normal.         Behavior: Behavior normal. Behavior is cooperative.         Thought Content: Thought content normal.         Cognition and Memory: Cognition and memory normal.         Judgment: Judgment normal.           Approximately 30 minutes of time were spent in preparation, direct patient contact, care coordination, documentation and activities otherwise related to this encounter.   Electronically signed by CLAIRE FLORES MD on 8/1/2024 at 6:10 PM.    Please note this chart was generated using dragon dictation software.  Although every effort was made to ensure the accuracy of this automated transcription, some errors in transcription may have occurred.

## 2024-08-06 DIAGNOSIS — I82.401 ACUTE DEEP VEIN THROMBOSIS (DVT) OF RIGHT LOWER EXTREMITY, UNSPECIFIED VEIN (HCC): ICD-10-CM

## 2024-08-13 DIAGNOSIS — K21.9 GASTROESOPHAGEAL REFLUX DISEASE WITHOUT ESOPHAGITIS: ICD-10-CM

## 2024-08-13 NOTE — TELEPHONE ENCOUNTER
Medication:   Requested Prescriptions     Pending Prescriptions Disp Refills    omeprazole (PRILOSEC) 40 MG delayed release capsule 90 capsule 3     Sig: Take 1 capsule by mouth daily        Last Filled:  [id number]    Patient Phone Number: [phone] (home)     Last appt: 8/1/2024   Next appt: Visit date not found    Last OARRS:       5/22/2024     7:05 AM   RX Monitoring   Acute Pain Prescriptions Severe pain not adequately treated with lower dose.   Periodic Controlled Substance Monitoring Possible medication side effects, risk of tolerance/dependence & alternative treatments discussed.;No signs of potential drug abuse or diversion identified.

## 2024-08-14 RX ORDER — OMEPRAZOLE 40 MG/1
40 CAPSULE, DELAYED RELEASE ORAL DAILY
Qty: 90 CAPSULE | Refills: 3 | Status: SHIPPED | OUTPATIENT
Start: 2024-08-14

## 2024-08-27 ENCOUNTER — TELEPHONE (OUTPATIENT)
Age: 64
End: 2024-08-27

## 2024-08-27 NOTE — TELEPHONE ENCOUNTER
Pt has an appt 8/29 and at her previous appt Dr. Rocha wanted to get a CXR.  Called the pt and asked if she had gotten that done and she said she has not gotten it yet but she is hoping to get it done before her appt Thursday

## 2024-08-28 ENCOUNTER — HOSPITAL ENCOUNTER (OUTPATIENT)
Dept: GENERAL RADIOLOGY | Age: 64
Discharge: HOME | End: 2024-08-28
Payer: COMMERCIAL

## 2024-08-28 DIAGNOSIS — R06.09 DOE (DYSPNEA ON EXERTION): ICD-10-CM

## 2024-08-28 PROCEDURE — 71046 X-RAY EXAM CHEST 2 VIEWS: CPT

## 2024-08-29 ENCOUNTER — OFFICE VISIT (OUTPATIENT)
Dept: PRIMARY CARE CLINIC | Age: 64
End: 2024-08-29
Payer: COMMERCIAL

## 2024-08-29 ENCOUNTER — OFFICE VISIT (OUTPATIENT)
Age: 64
End: 2024-08-29
Payer: COMMERCIAL

## 2024-08-29 VITALS
DIASTOLIC BLOOD PRESSURE: 80 MMHG | TEMPERATURE: 97.4 F | WEIGHT: 195.4 LBS | BODY MASS INDEX: 33.54 KG/M2 | OXYGEN SATURATION: 98 % | RESPIRATION RATE: 16 BRPM | SYSTOLIC BLOOD PRESSURE: 126 MMHG | HEART RATE: 83 BPM

## 2024-08-29 VITALS
DIASTOLIC BLOOD PRESSURE: 84 MMHG | SYSTOLIC BLOOD PRESSURE: 144 MMHG | WEIGHT: 194.5 LBS | OXYGEN SATURATION: 98 % | BODY MASS INDEX: 32.4 KG/M2 | HEART RATE: 81 BPM | HEIGHT: 65 IN

## 2024-08-29 DIAGNOSIS — J45.50 SEVERE PERSISTENT ASTHMA WITHOUT COMPLICATION: Primary | ICD-10-CM

## 2024-08-29 DIAGNOSIS — M19.90 INFLAMMATORY ARTHRITIS: Primary | ICD-10-CM

## 2024-08-29 PROCEDURE — 99214 OFFICE O/P EST MOD 30 MIN: CPT | Performed by: INTERNAL MEDICINE

## 2024-08-29 PROCEDURE — 3077F SYST BP >= 140 MM HG: CPT | Performed by: INTERNAL MEDICINE

## 2024-08-29 PROCEDURE — 99213 OFFICE O/P EST LOW 20 MIN: CPT | Performed by: FAMILY MEDICINE

## 2024-08-29 PROCEDURE — 3079F DIAST BP 80-89 MM HG: CPT | Performed by: FAMILY MEDICINE

## 2024-08-29 PROCEDURE — 3079F DIAST BP 80-89 MM HG: CPT | Performed by: INTERNAL MEDICINE

## 2024-08-29 PROCEDURE — 3074F SYST BP LT 130 MM HG: CPT | Performed by: FAMILY MEDICINE

## 2024-08-29 RX ORDER — PREDNISONE 20 MG/1
40 TABLET ORAL DAILY
Qty: 14 TABLET | Refills: 0 | Status: SHIPPED | OUTPATIENT
Start: 2024-08-29

## 2024-08-29 RX ORDER — FLUTICASONE FUROATE, UMECLIDINIUM BROMIDE AND VILANTEROL TRIFENATATE 200; 62.5; 25 UG/1; UG/1; UG/1
1 POWDER RESPIRATORY (INHALATION) DAILY
Qty: 2 EACH | Refills: 0 | Status: SHIPPED | COMMUNITY
Start: 2024-08-29

## 2024-08-29 NOTE — PROGRESS NOTES
cooperative, no apparent distress, and appears stated age  HEENT: No oropharyngeal exudate, PERRL, no cervical adenopathy, no tracheal deviation, thyroid size normal  LUNGS:  No increased work of breathing and clear to auscultation, no crackles or wheezing  CARDIOVASCULAR:  normal S1 and S2 and no JVD  ABDOMEN:  Normal bowel sounds, non-distended and non-tender to palpation  EXT: No edema, no calf tenderness. Pulses are present bilaterally.  NEUROLOGIC:  Mental Status Exam:  Level of Alertness:   awake  Orientation:   person, place, time.  SKIN:  normal skin color, texture, turgor, no redness, warmth, or swelling     DATA:    Labs  Hemoglobin: 14.6  Eosinophils: 100  IgE: None  ABG: none    Radiology Review:  Pertinent images / reports were reviewed as a part of this visit.  Chest x-ray 2020 reveals the following:    FINDINGS:     Trachea is midline.  Mild aortic tortuosity  Heart is normal in size.     Lungs and pleural surfaces are clear.     BONES: Multilevel degenerative disc disease. No destructive lesion or fracture     IMPRESSION:  1. No acute cardiopulmonary abnormalities.    PFTs:  5/20/2016    PULMONARY FUNCTION TEST INTERPRETATION    Respiratory therapy comments: The patient's efforts were   maximal and consistent. Results were reproducible.     Spirometric flow volume loop: appears to show an obstructive   defect.    Spirometry:  Spirometry shows mild obstructive defect.  There is a significant bronchodilator response.    Lung Volumes:  Lung Volumes are normal.     Diffusion Capacity for Carbon Monoxide:  Diffusing capacity is elevated and may suggest asthma.    Additional comments:   No previous PFTs available for comparison.       Assessment:      Diagnosis Orders   1. Severe persistent asthma without complication          Plan:   Benitez with prednisone 40 mg daily x 7 days  2.   Will give 4-week trial of Trelegy 200 mcg MDI 1 puff daily in place of Symbicort.  Should Trelegy worked better than

## 2024-08-29 NOTE — PROGRESS NOTES
PROGRESS NOTE  Date of Service:  8/29/2024    SUBJECTIVE:  Patient ID: Andria Mukherjee is a 64 y.o. female    ASSESSMENT  1. Inflammatory arthritis        PLAN:   1. Inflammatory arthritis  -     [place] - Yani Mcintyre MD, Rheumatology, [facility]  Significant inflammation at her hand and possible underlying psoriasis as well.  Referral placed to rheumatology.  Discussed that it is difficult to get into rheumatologist and if she should have difficulty a new referral from her orthopedist might expedite the process.  Do continue anti-inflammatories Tylenol as needed    Follow-up as needed          HPI:     She presents today with continued pain at her hand.  She met with Ortho in the past who recommended a referral to rheumatology however with her insurance she could not go to those rheumatologist and desires a different referral  She has significant pain and swelling at her MCP and across her hand  She notes she has had lesions on her skin with out documented diagnosis of psoriasis but this is a possibility    Reviewed notes from orthopedist and referral    Patient's medications, allergies, past medical, surgical, social and family histories were reviewed and updated as appropriate.     OBJECTIVE:  Vitals:    08/29/24 1304   BP: 126/80   Site: Left Upper Arm   Position: Sitting   Cuff Size: Large Adult   Pulse: 83   Resp: 16   Temp: 97.4 °F (36.3 °C)   SpO2: 98%   Weight: 88.6 kg (195 lb 6.4 oz)      Body mass index is 33.54 kg/m².   Physical Exam  Constitutional:       Appearance: Normal appearance.   HENT:      Head: Normocephalic and atraumatic.   Musculoskeletal:         General: Swelling and tenderness present.   Neurological:      General: No focal deficit present.      Mental Status: She is alert and oriented to person, place, and time.   Psychiatric:         Attention and Perception: Attention and perception normal.         Mood and Affect: Mood and affect normal.         Speech: Speech normal.

## 2024-09-05 ENCOUNTER — HOSPITAL ENCOUNTER (OUTPATIENT)
Dept: GENERAL RADIOLOGY | Age: 64
Discharge: HOME | End: 2024-09-05
Payer: COMMERCIAL

## 2024-09-05 DIAGNOSIS — M06.4 INFLAMMATORY POLYARTHROPATHY (HCC): ICD-10-CM

## 2024-09-05 PROCEDURE — 73130 X-RAY EXAM OF HAND: CPT

## 2024-09-06 DIAGNOSIS — J30.9 ALLERGIC RHINITIS, UNSPECIFIED SEASONALITY, UNSPECIFIED TRIGGER: ICD-10-CM

## 2024-09-06 DIAGNOSIS — J32.9 CHRONIC SINUSITIS, UNSPECIFIED LOCATION: ICD-10-CM

## 2024-09-09 RX ORDER — AZELASTINE HYDROCHLORIDE 137 UG/1
SPRAY, METERED NASAL
Qty: 90 ML | Refills: 1 | OUTPATIENT
Start: 2024-09-09

## 2024-09-30 DIAGNOSIS — R06.02 SOB (SHORTNESS OF BREATH): Primary | ICD-10-CM

## 2024-09-30 RX ORDER — FLUTICASONE FUROATE, UMECLIDINIUM BROMIDE AND VILANTEROL TRIFENATATE 200; 62.5; 25 UG/1; UG/1; UG/1
1 POWDER RESPIRATORY (INHALATION) DAILY
Qty: 3 EACH | Refills: 3 | Status: SHIPPED | OUTPATIENT
Start: 2024-09-30

## 2024-10-29 DIAGNOSIS — C73 THYROID CANCER (HCC): ICD-10-CM

## 2024-10-29 DIAGNOSIS — E89.0 HYPOTHYROIDISM, POSTSURGICAL: ICD-10-CM

## 2024-10-29 DIAGNOSIS — R79.89 ELEVATED LIVER FUNCTION TESTS: ICD-10-CM

## 2024-10-29 DIAGNOSIS — Z78.0 MENOPAUSE: ICD-10-CM

## 2024-10-29 DIAGNOSIS — R47.01 EXPRESSIVE APHASIA: ICD-10-CM

## 2024-10-30 LAB
ALBUMIN SERPL-MCNC: 4.8 G/DL (ref 3.4–5)
ALBUMIN/GLOB SERPL: 2.1 {RATIO} (ref 1.1–2.2)
ALP SERPL-CCNC: 123 U/L (ref 40–129)
ALT SERPL-CCNC: 107 U/L (ref 10–40)
ANION GAP SERPL CALCULATED.3IONS-SCNC: 13 MMOL/L (ref 3–16)
ANTI-THYROGLOB ABS: <15 IU/ML
AST SERPL-CCNC: 85 U/L (ref 15–37)
BILIRUB SERPL-MCNC: 0.6 MG/DL (ref 0–1)
BUN SERPL-MCNC: 13 MG/DL (ref 7–20)
CALCIUM SERPL-MCNC: 9.7 MG/DL (ref 8.3–10.6)
CHLORIDE SERPL-SCNC: 98 MMOL/L (ref 99–110)
CO2 SERPL-SCNC: 26 MMOL/L (ref 21–32)
CREAT SERPL-MCNC: 0.6 MG/DL (ref 0.6–1.2)
FOLATE SERPL-MCNC: 8.7 NG/ML (ref 4.78–24.2)
GFR SERPLBLD CREATININE-BSD FMLA CKD-EPI: >90 ML/MIN/{1.73_M2}
GLUCOSE SERPL-MCNC: 108 MG/DL (ref 70–99)
POTASSIUM SERPL-SCNC: 4.3 MMOL/L (ref 3.5–5.1)
PROT SERPL-MCNC: 7.1 G/DL (ref 6.4–8.2)
SODIUM SERPL-SCNC: 137 MMOL/L (ref 136–145)
T3FREE SERPL-MCNC: 2.7 PG/ML (ref 2.3–4.2)
T4 FREE SERPL-MCNC: 1.3 NG/DL (ref 0.9–1.8)
TSH SERPL DL<=0.005 MIU/L-ACNC: 1.42 UIU/ML (ref 0.27–4.2)
VIT B12 SERPL-MCNC: 639 PG/ML (ref 211–911)

## 2024-10-31 ENCOUNTER — OFFICE VISIT (OUTPATIENT)
Dept: ENDOCRINOLOGY | Age: 64
End: 2024-10-31
Payer: COMMERCIAL

## 2024-10-31 VITALS
WEIGHT: 197 LBS | BODY MASS INDEX: 33.63 KG/M2 | DIASTOLIC BLOOD PRESSURE: 92 MMHG | HEIGHT: 64 IN | SYSTOLIC BLOOD PRESSURE: 161 MMHG | OXYGEN SATURATION: 98 % | TEMPERATURE: 98 F | RESPIRATION RATE: 14 BRPM | HEART RATE: 89 BPM

## 2024-10-31 DIAGNOSIS — C73 THYROID CANCER (HCC): ICD-10-CM

## 2024-10-31 DIAGNOSIS — Z78.0 MENOPAUSE: ICD-10-CM

## 2024-10-31 DIAGNOSIS — R79.89 ELEVATED LIVER FUNCTION TESTS: ICD-10-CM

## 2024-10-31 DIAGNOSIS — E66.811 CLASS 1 OBESITY WITH SERIOUS COMORBIDITY AND BODY MASS INDEX (BMI) OF 33.0 TO 33.9 IN ADULT, UNSPECIFIED OBESITY TYPE: ICD-10-CM

## 2024-10-31 DIAGNOSIS — E89.0 HYPOTHYROIDISM, POSTSURGICAL: Primary | ICD-10-CM

## 2024-10-31 PROCEDURE — 99214 OFFICE O/P EST MOD 30 MIN: CPT | Performed by: INTERNAL MEDICINE

## 2024-10-31 PROCEDURE — 3080F DIAST BP >= 90 MM HG: CPT | Performed by: INTERNAL MEDICINE

## 2024-10-31 PROCEDURE — 3077F SYST BP >= 140 MM HG: CPT | Performed by: INTERNAL MEDICINE

## 2024-10-31 RX ORDER — LEVOTHYROXINE SODIUM 175 UG/1
175 TABLET ORAL DAILY
Qty: 90 TABLET | Refills: 1 | Status: SHIPPED | OUTPATIENT
Start: 2024-10-31

## 2024-10-31 NOTE — PROGRESS NOTES
02:03 PM    CALCIUM 9.7 10/29/2024 02:03 PM    BILITOT 0.6 10/29/2024 02:03 PM    ALKPHOS 123 10/29/2024 02:03 PM    AST 85 10/29/2024 02:03 PM     10/29/2024 02:03 PM    LABGLOM >90 10/29/2024 02:03 PM    LABGLOM >60 03/14/2024 03:34 PM    GFRAA >60 09/08/2022 11:12 AM    AGRATIO 2.1 10/29/2024 02:03 PM    GLOB 2.1 11/18/2020 11:49 AM     Lab Results   Component Value Date/Time    TSHFT4 8.53 11/18/2020 11:49 AM    TSH 1.42 10/29/2024 02:03 PM    FT3 2.7 10/29/2024 02:03 PM     Lab Results   Component Value Date/Time    LABA1C 5.6 10/05/2023 02:45 PM     Lab Results   Component Value Date/Time    .0 10/05/2023 02:45 PM     Lab Results   Component Value Date/Time    CHOL 280 10/05/2023 02:45 PM     Lab Results   Component Value Date/Time    TRIG 126 10/05/2023 02:45 PM     Lab Results   Component Value Date/Time    HDL 92 10/05/2023 02:45 PM     No components found for: \"LDLCHOLESTEROL\", \"LDLCALC\"    Lab Results   Component Value Date/Time    VLDL 25 10/05/2023 02:45 PM       No results found for: \"CHOLHDLRATIO\"  No results found for: \"EWMW25OSN\"  No results found for: \"VITD25\"     ASSESSMENT/PLAN:  1. Hypothyroidism, postsurgical  Treated for RA, on new medication  TSH 0.15-2.15-6.39-0.67-0.8-1.42  Levothyroxine 0.175 mg daily  - T3, Free; Future  - T4, Free; Future  - TSH without Reflex; Future    2. Thyroid cancer (HCC)  TSH<0.5-<0.5-<0.5<0.5  TgAb 12-16-14-<15, negative  Thyroglobulin antibody negative   Total thyroidectomy 2015  [id number]  Largest tumor diameter 1.4 cm, encapsulated, follicular variant of papillary thyroid carcinoma  No I-131 Tx  9/21/2023 CT/CTA neck  Status post thyroidectomy without suspicious findings to suggest metastatic  disease.  NECK SOFT TISSUES: Prior thyroidectomy. No lymphadenopathy or suspicious  findings.  7/11/2024  Postoperative changes of total thyroidectomy.  No evidence of local recurrent or metastatic disease in the neck.  - T3, Free; Future  - T4, Free;

## 2024-11-06 ENCOUNTER — APPOINTMENT (OUTPATIENT)
Dept: CT IMAGING | Age: 64
End: 2024-11-06
Payer: COMMERCIAL

## 2024-11-06 ENCOUNTER — HOSPITAL ENCOUNTER (EMERGENCY)
Age: 64
Discharge: HOME | End: 2024-11-06
Attending: EMERGENCY MEDICINE
Payer: COMMERCIAL

## 2024-11-06 VITALS
BODY MASS INDEX: 33.46 KG/M2 | RESPIRATION RATE: 15 BRPM | SYSTOLIC BLOOD PRESSURE: 136 MMHG | TEMPERATURE: 97.7 F | HEART RATE: 88 BPM | OXYGEN SATURATION: 92 % | WEIGHT: 196 LBS | HEIGHT: 64 IN | DIASTOLIC BLOOD PRESSURE: 78 MMHG

## 2024-11-06 DIAGNOSIS — R11.0 NAUSEA: ICD-10-CM

## 2024-11-06 DIAGNOSIS — R19.7 DIARRHEA, UNSPECIFIED TYPE: ICD-10-CM

## 2024-11-06 DIAGNOSIS — R10.13 ABDOMINAL PAIN, EPIGASTRIC: Primary | ICD-10-CM

## 2024-11-06 LAB
ALBUMIN SERPL-MCNC: 4.9 G/DL (ref 3.4–5)
ALP SERPL-CCNC: 130 U/L (ref 40–129)
ALT SERPL-CCNC: 177 U/L (ref 10–40)
ANION GAP SERPL CALCULATED.3IONS-SCNC: 16 MMOL/L (ref 3–16)
AST SERPL-CCNC: 190 U/L (ref 15–37)
BACTERIA URNS QL MICRO: ABNORMAL /HPF
BASOPHILS # BLD: 0.1 K/UL (ref 0–0.2)
BASOPHILS NFR BLD: 1 %
BILIRUB DIRECT SERPL-MCNC: <0.1 MG/DL (ref 0–0.3)
BILIRUB INDIRECT SERPL-MCNC: 0.2 MG/DL (ref 0–1)
BILIRUB SERPL-MCNC: 0.3 MG/DL (ref 0–1)
BILIRUB UR QL STRIP.AUTO: NEGATIVE
BUN SERPL-MCNC: 13 MG/DL (ref 7–20)
CALCIUM SERPL-MCNC: 9.3 MG/DL (ref 8.3–10.6)
CHLORIDE SERPL-SCNC: 97 MMOL/L (ref 99–110)
CLARITY UR: CLEAR
CO2 SERPL-SCNC: 24 MMOL/L (ref 21–32)
COLOR UR: YELLOW
CREAT SERPL-MCNC: 0.8 MG/DL (ref 0.6–1.2)
DEPRECATED RDW RBC AUTO: 12.8 % (ref 12.4–15.4)
EOSINOPHIL # BLD: 0.1 K/UL (ref 0–0.6)
EOSINOPHIL NFR BLD: 0.7 %
GFR SERPLBLD CREATININE-BSD FMLA CKD-EPI: 82 ML/MIN/{1.73_M2}
GLUCOSE SERPL-MCNC: 95 MG/DL (ref 70–99)
GLUCOSE UR STRIP.AUTO-MCNC: NEGATIVE MG/DL
HCT VFR BLD AUTO: 46.8 % (ref 36–48)
HGB BLD-MCNC: 15.8 G/DL (ref 12–16)
HGB UR QL STRIP.AUTO: NEGATIVE
KETONES UR STRIP.AUTO-MCNC: NEGATIVE MG/DL
LEUKOCYTE ESTERASE UR QL STRIP.AUTO: NEGATIVE
LIPASE SERPL-CCNC: 38 U/L (ref 13–60)
LYMPHOCYTES # BLD: 1.6 K/UL (ref 1–5.1)
LYMPHOCYTES NFR BLD: 19.8 %
MCH RBC QN AUTO: 32.1 PG (ref 26–34)
MCHC RBC AUTO-ENTMCNC: 33.8 G/DL (ref 31–36)
MCV RBC AUTO: 95.2 FL (ref 80–100)
MONOCYTES # BLD: 0.4 K/UL (ref 0–1.3)
MONOCYTES NFR BLD: 5.2 %
MUCOUS THREADS #/AREA URNS LPF: ABNORMAL /LPF
NEUTROPHILS # BLD: 5.9 K/UL (ref 1.7–7.7)
NEUTROPHILS NFR BLD: 73.3 %
NITRITE UR QL STRIP.AUTO: NEGATIVE
PH UR STRIP.AUTO: 6 [PH] (ref 5–8)
PLATELET # BLD AUTO: 327 K/UL (ref 135–450)
PMV BLD AUTO: 8.9 FL (ref 5–10.5)
POTASSIUM SERPL-SCNC: 4.1 MMOL/L (ref 3.5–5.1)
PROT SERPL-MCNC: 7.8 G/DL (ref 6.4–8.2)
PROT UR STRIP.AUTO-MCNC: NEGATIVE MG/DL
RBC # BLD AUTO: 4.92 M/UL (ref 4–5.2)
RBC #/AREA URNS HPF: ABNORMAL /HPF (ref 0–4)
SODIUM SERPL-SCNC: 137 MMOL/L (ref 136–145)
SP GR UR STRIP.AUTO: 1.02 (ref 1–1.03)
UA DIPSTICK W REFLEX MICRO PNL UR: ABNORMAL
URN SPEC COLLECT METH UR: ABNORMAL
UROBILINOGEN UR STRIP-ACNC: 0.2 E.U./DL
WBC # BLD AUTO: 8.1 K/UL (ref 4–11)
WBC #/AREA URNS HPF: ABNORMAL /HPF (ref 0–5)

## 2024-11-06 PROCEDURE — 81001 URINALYSIS AUTO W/SCOPE: CPT

## 2024-11-06 PROCEDURE — 83690 ASSAY OF LIPASE: CPT

## 2024-11-06 PROCEDURE — 85025 COMPLETE CBC W/AUTO DIFF WBC: CPT

## 2024-11-06 PROCEDURE — 6370000000 HC RX 637 (ALT 250 FOR IP): Performed by: EMERGENCY MEDICINE

## 2024-11-06 PROCEDURE — 96374 THER/PROPH/DIAG INJ IV PUSH: CPT

## 2024-11-06 PROCEDURE — 6360000002 HC RX W HCPCS: Performed by: EMERGENCY MEDICINE

## 2024-11-06 PROCEDURE — 6360000004 HC RX CONTRAST MEDICATION: Performed by: EMERGENCY MEDICINE

## 2024-11-06 PROCEDURE — 80048 BASIC METABOLIC PNL TOTAL CA: CPT

## 2024-11-06 PROCEDURE — 80076 HEPATIC FUNCTION PANEL: CPT

## 2024-11-06 PROCEDURE — 74177 CT ABD & PELVIS W/CONTRAST: CPT

## 2024-11-06 PROCEDURE — 99285 EMERGENCY DEPT VISIT HI MDM: CPT

## 2024-11-06 RX ORDER — IOPAMIDOL 755 MG/ML
75 INJECTION, SOLUTION INTRAVASCULAR
Status: COMPLETED | OUTPATIENT
Start: 2024-11-06 | End: 2024-11-06

## 2024-11-06 RX ORDER — DIATRIZOATE MEGLUMINE AND DIATRIZOATE SODIUM 660; 100 MG/ML; MG/ML
30 SOLUTION ORAL; RECTAL
Status: DISCONTINUED | OUTPATIENT
Start: 2024-11-06 | End: 2024-11-06 | Stop reason: HOSPADM

## 2024-11-06 RX ORDER — PROCHLORPERAZINE EDISYLATE 5 MG/ML
10 INJECTION INTRAMUSCULAR; INTRAVENOUS ONCE
Status: COMPLETED | OUTPATIENT
Start: 2024-11-06 | End: 2024-11-06

## 2024-11-06 RX ORDER — PROCHLORPERAZINE MALEATE 5 MG/1
5 TABLET ORAL EVERY 6 HOURS PRN
Status: DISCONTINUED | OUTPATIENT
Start: 2024-11-06 | End: 2024-11-06 | Stop reason: HOSPADM

## 2024-11-06 RX ADMIN — PROCHLORPERAZINE EDISYLATE 10 MG: 5 INJECTION INTRAMUSCULAR; INTRAVENOUS at 07:09

## 2024-11-06 RX ADMIN — DIATRIZOATE MEGLUMINE AND DIATRIZOATE SODIUM 30 ML: 660; 100 LIQUID ORAL; RECTAL at 09:06

## 2024-11-06 RX ADMIN — IOPAMIDOL 75 ML: 755 INJECTION, SOLUTION INTRAVENOUS at 08:59

## 2024-11-06 RX ADMIN — PROCHLORPERAZINE MALEATE 5 MG: 5 TABLET ORAL at 10:02

## 2024-11-06 ASSESSMENT — ENCOUNTER SYMPTOMS
DIARRHEA: 0
CONSTIPATION: 1
EYES NEGATIVE: 1
NAUSEA: 1
RESPIRATORY NEGATIVE: 1
ABDOMINAL PAIN: 1
VOMITING: 1

## 2024-11-06 ASSESSMENT — PAIN - FUNCTIONAL ASSESSMENT: PAIN_FUNCTIONAL_ASSESSMENT: 0-10

## 2024-11-06 ASSESSMENT — PAIN SCALES - GENERAL: PAINLEVEL_OUTOF10: 9

## 2024-11-06 ASSESSMENT — PAIN DESCRIPTION - LOCATION: LOCATION: BUTTOCKS

## 2024-11-06 ASSESSMENT — LIFESTYLE VARIABLES
HOW OFTEN DO YOU HAVE A DRINK CONTAINING ALCOHOL: 4 OR MORE TIMES A WEEK
HOW MANY STANDARD DRINKS CONTAINING ALCOHOL DO YOU HAVE ON A TYPICAL DAY: 3 OR 4

## 2024-11-06 NOTE — DISCHARGE INSTRUCTIONS
Your CT scan here in the emergency department was normal without evidence of inflammation of the colon or bowel obstruction.  Due to diarrhea please continue to stay hydrated with electrolyte rich fluids such as Gatorade or Pedialyte.  Please return to the emergency department if you develop worsening fevers, chills, or inability to tolerate oral fluids or medications.

## 2024-11-06 NOTE — ED PROVIDER NOTES
"I am not feeling the baby moving from 8pm, I am scheduled for induction , they asked me to come at midnight"
REFERRED TO:  Ban Berg MD  [address]  [facility] [address]  [phone]    Schedule an appointment as soon as possible for a visit         DISCHARGE MEDICATIONS:  Discharge Medication List as of 11/6/2024 10:06 AM          DISPOSITION Decision To Discharge 11/06/2024 10:06:44 AM             Diagnostic Results and Other Data                                   RADIOLOGY:  CT ABDOMEN PELVIS W IV CONTRAST Additional Contrast? Oral   Final Result   1. Hepatic steatosis.      2. Colonic diverticulosis without acute diverticulitis.      3. Atherosclerotic disease.         Electronically signed by Jose A Robbins          LABS:   Results for orders placed or performed during the hospital encounter of 11/06/24   CBC with Auto Differential   Result Value Ref Range    WBC 8.1 4.0 - 11.0 K/uL    RBC 4.92 4.00 - 5.20 M/uL    Hemoglobin 15.8 12.0 - 16.0 g/dL    Hematocrit 46.8 36.0 - 48.0 %    MCV 95.2 80.0 - 100.0 fL    MCH 32.1 26.0 - 34.0 pg    MCHC 33.8 31.0 - 36.0 g/dL    RDW 12.8 12.4 - 15.4 %    Platelets 327 135 - 450 K/uL    MPV 8.9 5.0 - 10.5 fL    Neutrophils % 73.3 %    Lymphocytes % 19.8 %    Monocytes % 5.2 %    Eosinophils % 0.7 %    Basophils % 1.0 %    Neutrophils Absolute 5.9 1.7 - 7.7 K/uL    Lymphocytes Absolute 1.6 1.0 - 5.1 K/uL    Monocytes Absolute 0.4 0.0 - 1.3 K/uL    Eosinophils Absolute 0.1 0.0 - 0.6 K/uL    Basophils Absolute 0.1 0.0 - 0.2 K/uL   Basic Metabolic Panel   Result Value Ref Range    Sodium 137 136 - 145 mmol/L    Potassium 4.1 3.5 - 5.1 mmol/L    Chloride 97 (L) 99 - 110 mmol/L    CO2 24 21 - 32 mmol/L    Anion Gap 16 3 - 16    Glucose 95 70 - 99 mg/dL    BUN 13 7 - 20 mg/dL    Creatinine 0.8 0.6 - 1.2 mg/dL    Est, Glom Filt Rate 82 >60    Calcium 9.3 8.3 - 10.6 mg/dL   Hepatic Function Panel   Result Value Ref Range    Total Protein 7.8 6.4 - 8.2 g/dL    Albumin 4.9 3.4 - 5.0 g/dL    Alkaline Phosphatase 130 (H) 40 - 129 U/L     (H) 10 - 40 U/L     (H) 15 - 
and nursing note reviewed. Exam conducted with a chaperone present.   Constitutional:       General: She is not in acute distress.  HENT:      Head: Normocephalic and atraumatic.      Mouth/Throat:      Mouth: Mucous membranes are moist.      Pharynx: No oropharyngeal exudate.   Eyes:      General: No scleral icterus.     Extraocular Movements: Extraocular movements intact.      Conjunctiva/sclera: Conjunctivae normal.      Pupils: Pupils are equal, round, and reactive to light.   Cardiovascular:      Rate and Rhythm: Normal rate and regular rhythm.      Heart sounds: Normal heart sounds.   Pulmonary:      Effort: Pulmonary effort is normal.      Breath sounds: Normal breath sounds. No wheezing, rhonchi or rales.   Abdominal:      General: Bowel sounds are normal.      Palpations: Abdomen is soft.      Tenderness: There is no abdominal tenderness. There is no guarding or rebound.   Genitourinary:     Rectum: External hemorrhoid present.      Comments: 2 external hemorrhoids present on rectal exam with no evidence of thrombosis.  Patient has no formed stool in the rectal vault and only a small amount of liquid stool present.  Musculoskeletal:         General: No swelling. Normal range of motion.      Cervical back: Normal range of motion and neck supple.   Skin:     General: Skin is warm and dry.   Neurological:      General: No focal deficit present.      Mental Status: She is alert and oriented to person, place, and time.      Cranial Nerves: No cranial nerve deficit.      Motor: No weakness.      Coordination: Coordination normal.                    Jerome Staples MD  11/06/24 0169

## 2024-11-07 ENCOUNTER — OFFICE VISIT (OUTPATIENT)
Age: 64
End: 2024-11-07
Payer: COMMERCIAL

## 2024-11-07 VITALS
BODY MASS INDEX: 32.44 KG/M2 | SYSTOLIC BLOOD PRESSURE: 131 MMHG | HEIGHT: 64 IN | HEART RATE: 81 BPM | OXYGEN SATURATION: 97 % | WEIGHT: 190 LBS | DIASTOLIC BLOOD PRESSURE: 88 MMHG

## 2024-11-07 DIAGNOSIS — J30.9 ALLERGIC RHINITIS, UNSPECIFIED SEASONALITY, UNSPECIFIED TRIGGER: ICD-10-CM

## 2024-11-07 DIAGNOSIS — J45.50 SEVERE PERSISTENT ASTHMA WITHOUT COMPLICATION: Primary | ICD-10-CM

## 2024-11-07 PROCEDURE — 3079F DIAST BP 80-89 MM HG: CPT | Performed by: INTERNAL MEDICINE

## 2024-11-07 PROCEDURE — 99213 OFFICE O/P EST LOW 20 MIN: CPT | Performed by: INTERNAL MEDICINE

## 2024-11-07 PROCEDURE — 3075F SYST BP GE 130 - 139MM HG: CPT | Performed by: INTERNAL MEDICINE

## 2024-11-07 RX ORDER — CERTOLIZUMAB PEGOL 400 MG
400 KIT SUBCUTANEOUS ONCE
COMMUNITY
Start: 2024-09-17

## 2024-11-07 RX ORDER — MONTELUKAST SODIUM 10 MG/1
TABLET ORAL
Qty: 90 TABLET | Refills: 3 | Status: SHIPPED | OUTPATIENT
Start: 2024-11-07

## 2024-11-07 RX ORDER — FLUTICASONE PROPIONATE 50 MCG
2 SPRAY, SUSPENSION (ML) NASAL DAILY
Qty: 3 EACH | Refills: 3 | Status: SHIPPED | OUTPATIENT
Start: 2024-11-07 | End: 2024-12-07

## 2024-11-07 NOTE — PROGRESS NOTES
[facility]    Outpatient Follow Up Note    Subjective:   CHIEF COMPLAINT / HPI:     The patient is 64 y.o. female here for follow-up of severe persistent asthma with allergic rhinitis.  Last visit I gave her another prednisone burst 40 mg x 1 week and change her Symbicort to Trelegy.  She states that she has done significantly better and has no dyspnea on exertion unless she goes up 14 steps.  She has no cough, wheezing, or chest tightness    8/29/2024  Andria is here for a 5-week follow-up of uncontrolled moderate persistent asthma.  Last visit I gave her prednisone 40 mg daily, started her on Symbicort 160 mcg MDI 2 puffs twice daily, and change her albuterol to Xopenex due to tremors.  She states the prednisone really helped her but once off that she did feel like she lost some asthma control.  She is still dyspneic when walking to her garden barn but she states she is about 50% improved.  She continues to have a cough and occasional wheeze.  Tremors are much less on Xopenex than albuterol    7/22/2024  Andria presents today for a new patient visit for evaluation and management of asthma.  She was under the care of of [facility] pulmonology from [id number].  She had PFTs then that did show an obstructive defect.  She was managed with Symbicort and Singulair.  She did have 1 hospitalization in 2019 for an asthma exacerbation but did not need ICU level care or intubation.    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  She states that chronically she is dyspneic when she does activities such as walking [place].  She has a daily cough and has episodes of abrupt worsening of her dyspnea associated with a stridorous wheezing sound.  These episodes are relieved with albuterol.  She does not have any fevers, chills, peripheral edema, or anorexia.  She states that she has gained approximately 10 pounds recently that she feels is due to inactivity given her

## 2024-12-02 DIAGNOSIS — R79.89 ELEVATED LIVER FUNCTION TESTS: ICD-10-CM

## 2024-12-02 DIAGNOSIS — Z78.0 MENOPAUSE: ICD-10-CM

## 2024-12-02 DIAGNOSIS — C73 THYROID CANCER (HCC): ICD-10-CM

## 2024-12-02 DIAGNOSIS — E89.0 HYPOTHYROIDISM, POSTSURGICAL: ICD-10-CM

## 2024-12-02 RX ORDER — LEVOTHYROXINE SODIUM 175 UG/1
175 TABLET ORAL DAILY
Qty: 90 TABLET | Refills: 1 | Status: SHIPPED | OUTPATIENT
Start: 2024-12-02

## 2024-12-17 ENCOUNTER — HOSPITAL ENCOUNTER (OUTPATIENT)
Dept: WOMENS IMAGING | Age: 64
Discharge: HOME | End: 2024-12-17
Payer: COMMERCIAL

## 2024-12-17 VITALS — BODY MASS INDEX: 32.44 KG/M2 | WEIGHT: 190 LBS | HEIGHT: 64 IN

## 2024-12-17 DIAGNOSIS — Z12.31 VISIT FOR SCREENING MAMMOGRAM: ICD-10-CM

## 2024-12-17 PROCEDURE — 77063 BREAST TOMOSYNTHESIS BI: CPT

## 2024-12-20 LAB — THYROGLOB SERPL-MCNC: <0.5 NG/ML (ref 1.3–31.8)

## 2024-12-30 ENCOUNTER — TELEPHONE (OUTPATIENT)
Dept: PRIMARY CARE CLINIC | Age: 64
End: 2024-12-30

## 2024-12-30 DIAGNOSIS — I10 PRIMARY HYPERTENSION: ICD-10-CM

## 2024-12-30 RX ORDER — AMLODIPINE BESYLATE 10 MG/1
TABLET ORAL
Qty: 90 TABLET | Refills: 1 | OUTPATIENT
Start: 2024-12-30

## 2024-12-30 NOTE — TELEPHONE ENCOUNTER
Pt due for her physical and fasting labs. She says she will call later today when she gets her schedule.

## 2024-12-30 NOTE — TELEPHONE ENCOUNTER
Spoke with patient, she reports having enough medication until upcoming appointment  No further action is needed for this encounter.      Medication:   Requested Prescriptions     Pending Prescriptions Disp Refills    amLODIPine (NORVASC) 10 MG tablet 90 tablet 1     Sig: Take 1 tablet by mouth daily        Last Filled:      Patient Phone Number: [phone] (home)     Last appt: 8/29/2024   Next appt: 1/7/2025    Last OARRS:       5/22/2024     7:05 AM   RX Monitoring   Acute Pain Prescriptions Severe pain not adequately treated with lower dose.   Periodic Controlled Substance Monitoring Possible medication side effects, risk of tolerance/dependence & alternative treatments discussed.;No signs of potential drug abuse or diversion identified.

## 2025-01-06 PROBLEM — Z85.850 HISTORY OF THYROID CANCER: Status: ACTIVE | Noted: 2023-09-26

## 2025-01-06 PROBLEM — E66.811 CLASS 1 OBESITY WITH SERIOUS COMORBIDITY AND BODY MASS INDEX (BMI) OF 33.0 TO 33.9 IN ADULT: Status: RESOLVED | Noted: 2024-10-31 | Resolved: 2025-01-06

## 2025-01-06 PROBLEM — J45.50 SEVERE PERSISTENT ASTHMA WITHOUT COMPLICATION: Status: ACTIVE | Noted: 2025-01-06

## 2025-01-07 ENCOUNTER — TELEPHONE (OUTPATIENT)
Dept: PRIMARY CARE CLINIC | Age: 65
End: 2025-01-07

## 2025-01-07 DIAGNOSIS — I10 PRIMARY HYPERTENSION: ICD-10-CM

## 2025-01-07 RX ORDER — AMLODIPINE BESYLATE 10 MG/1
TABLET ORAL
Qty: 30 TABLET | Refills: 0 | Status: SHIPPED | OUTPATIENT
Start: 2025-01-07

## 2025-01-07 NOTE — TELEPHONE ENCOUNTER
Medication:   Requested Prescriptions     Pending Prescriptions Disp Refills    amLODIPine (NORVASC) 10 MG tablet 90 tablet 1     Sig: Take 1 tablet by mouth daily        Last Filled:[id number] #90 x 1  APPT. CANCELED DUE TO SNOW/OFFICE    Patient Phone Number: [phone] (home)     Last appt: 8/29/2024   Next appt: 1/7/2025    Last OARRS:       5/22/2024     7:05 AM   RX Monitoring   Acute Pain Prescriptions Severe pain not adequately treated with lower dose.   Periodic Controlled Substance Monitoring Possible medication side effects, risk of tolerance/dependence & alternative treatments discussed.;No signs of potential drug abuse or diversion identified.

## 2025-01-14 ENCOUNTER — APPOINTMENT (OUTPATIENT)
Age: 65
End: 2025-01-14
Payer: COMMERCIAL

## 2025-01-14 ENCOUNTER — HOSPITAL ENCOUNTER (EMERGENCY)
Age: 65
Discharge: HOME | End: 2025-01-15
Attending: EMERGENCY MEDICINE
Payer: COMMERCIAL

## 2025-01-14 DIAGNOSIS — S70.01XA CONTUSION OF RIGHT HIP, INITIAL ENCOUNTER: ICD-10-CM

## 2025-01-14 DIAGNOSIS — Z79.01 ANTICOAGULATED: ICD-10-CM
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
## 2025-01-14 DIAGNOSIS — S62.304A CLOSED NONDISPLACED FRACTURE OF FOURTH METACARPAL BONE OF RIGHT HAND, UNSPECIFIED PORTION OF METACARPAL, INITIAL ENCOUNTER: ICD-10-CM

## 2025-01-14 DIAGNOSIS — S62.306A CLOSED DISPLACED FRACTURE OF FIFTH METACARPAL BONE OF RIGHT HAND, UNSPECIFIED PORTION OF METACARPAL, INITIAL ENCOUNTER: ICD-10-CM

## 2025-01-14 PROCEDURE — 96374 THER/PROPH/DIAG INJ IV PUSH: CPT

## 2025-01-14 PROCEDURE — 6360000002 HC RX W HCPCS: Performed by: EMERGENCY MEDICINE

## 2025-01-14 PROCEDURE — 70450 CT HEAD/BRAIN W/O DYE: CPT

## 2025-01-14 PROCEDURE — 6370000000 HC RX 637 (ALT 250 FOR IP): Performed by: EMERGENCY MEDICINE

## 2025-01-14 PROCEDURE — 73552 X-RAY EXAM OF FEMUR 2/>: CPT

## 2025-01-14 PROCEDURE — 73130 X-RAY EXAM OF HAND: CPT

## 2025-01-14 PROCEDURE — 72170 X-RAY EXAM OF PELVIS: CPT

## 2025-01-14 PROCEDURE — 29125 APPL SHORT ARM SPLINT STATIC: CPT

## 2025-01-14 PROCEDURE — 99284 EMERGENCY DEPT VISIT MOD MDM: CPT

## 2025-01-14 PROCEDURE — 73110 X-RAY EXAM OF WRIST: CPT

## 2025-01-14 RX ORDER — METHOCARBAMOL 500 MG/1
500 TABLET, FILM COATED ORAL ONCE
Status: COMPLETED | OUTPATIENT
Start: 2025-01-14 | End: 2025-01-14

## 2025-01-14 RX ORDER — OXYCODONE AND ACETAMINOPHEN 5; 325 MG/1; MG/1
1 TABLET ORAL ONCE
Status: COMPLETED | OUTPATIENT
Start: 2025-01-14 | End: 2025-01-14

## 2025-01-14 RX ADMIN — METHOCARBAMOL TABLETS 500 MG: 500 TABLET, COATED ORAL at 22:55

## 2025-01-14 RX ADMIN — OXYCODONE HYDROCHLORIDE AND ACETAMINOPHEN 1 TABLET: 5; 325 TABLET ORAL at 22:54

## 2025-01-14 RX ADMIN — HYDROMORPHONE HYDROCHLORIDE 1 MG: 1 INJECTION, SOLUTION INTRAMUSCULAR; INTRAVENOUS; SUBCUTANEOUS at 21:36

## 2025-01-14 ASSESSMENT — PAIN SCALES - GENERAL
PAINLEVEL_OUTOF10: 8
PAINLEVEL_OUTOF10: 6
PAINLEVEL_OUTOF10: 9
PAINLEVEL_OUTOF10: 10

## 2025-01-14 ASSESSMENT — PAIN DESCRIPTION - ORIENTATION
ORIENTATION: LEFT
ORIENTATION: RIGHT
ORIENTATION: RIGHT

## 2025-01-14 ASSESSMENT — PAIN - FUNCTIONAL ASSESSMENT
PAIN_FUNCTIONAL_ASSESSMENT: NONE - DENIES PAIN
PAIN_FUNCTIONAL_ASSESSMENT: 0-10
PAIN_FUNCTIONAL_ASSESSMENT: 0-10

## 2025-01-14 ASSESSMENT — PAIN DESCRIPTION - LOCATION
LOCATION: HEAD;HIP
LOCATION: WRIST

## 2025-01-14 ASSESSMENT — LIFESTYLE VARIABLES
HOW MANY STANDARD DRINKS CONTAINING ALCOHOL DO YOU HAVE ON A TYPICAL DAY: 1 OR 2
HOW OFTEN DO YOU HAVE A DRINK CONTAINING ALCOHOL: 4 OR MORE TIMES A WEEK

## 2025-01-14 ASSESSMENT — PAIN DESCRIPTION - DESCRIPTORS
DESCRIPTORS: ACHING
DESCRIPTORS: SHARP
DESCRIPTORS: SHOOTING;SHARP

## 2025-01-15 ENCOUNTER — TELEPHONE (OUTPATIENT)
Dept: ORTHOPEDIC SURGERY | Age: 65
End: 2025-01-15

## 2025-01-15 ENCOUNTER — OFFICE VISIT (OUTPATIENT)
Dept: ORTHOPEDIC SURGERY | Age: 65
End: 2025-01-15
Payer: COMMERCIAL

## 2025-01-15 VITALS
TEMPERATURE: 98.1 F | HEART RATE: 87 BPM | RESPIRATION RATE: 16 BRPM | HEIGHT: 64 IN | OXYGEN SATURATION: 97 % | DIASTOLIC BLOOD PRESSURE: 90 MMHG | BODY MASS INDEX: 33.51 KG/M2 | SYSTOLIC BLOOD PRESSURE: 148 MMHG | WEIGHT: 196.3 LBS

## 2025-01-15 DIAGNOSIS — S62.316A CLOSED DISPLACED FRACTURE OF BASE OF FIFTH METACARPAL BONE OF RIGHT HAND, INITIAL ENCOUNTER: Primary | ICD-10-CM

## 2025-01-15 DIAGNOSIS — M25.551 RIGHT HIP PAIN: ICD-10-CM

## 2025-01-15 PROCEDURE — 99204 OFFICE O/P NEW MOD 45 MIN: CPT | Performed by: ORTHOPAEDIC SURGERY

## 2025-01-15 PROCEDURE — 26600 TREAT METACARPAL FRACTURE: CPT | Performed by: ORTHOPAEDIC SURGERY

## 2025-01-15 PROCEDURE — L3809 WHFO W/O JOINTS PRE OTS: HCPCS | Performed by: ORTHOPAEDIC SURGERY

## 2025-01-15 RX ORDER — OXYCODONE AND ACETAMINOPHEN 5; 325 MG/1; MG/1
1 TABLET ORAL EVERY 4 HOURS PRN
Qty: 15 TABLET | Refills: 0 | Status: SHIPPED | OUTPATIENT
Start: 2025-01-15 | End: 2025-01-18

## 2025-01-15 SDOH — HEALTH STABILITY: PHYSICAL HEALTH: ON AVERAGE, HOW MANY MINUTES DO YOU ENGAGE IN EXERCISE AT THIS LEVEL?: 30 MIN

## 2025-01-15 SDOH — HEALTH STABILITY: PHYSICAL HEALTH: ON AVERAGE, HOW MANY DAYS PER WEEK DO YOU ENGAGE IN MODERATE TO STRENUOUS EXERCISE (LIKE A BRISK WALK)?: 4 DAYS

## 2025-01-15 NOTE — PROGRESS NOTES
CHIEF COMPLAINT:   1- Right hand pain/ 5th MC shaft fracture.  2- Right hip pain/ possible occult fracture.    DATE OF INJURY: 1/14/2025    HISTORY:  Ms. Mukherjee 64 y.o.  female right handed presents today for the first visit for evaluation of a right hand and hip injury which occurred when she fell onto her right side. She is complaining of ulnar hand pain and swelling. This is better with elevation and worse with ROM. The pain is sharp and not radiating. No other complaint. She was seen at [facility], where she was evaluated and splinted and asked to see orthopedics. 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    Past Medical History:   Diagnosis Date    Asthma     Carpal tunnel syndrome     Dental disease     Dizziness     DVT (deep venous thrombosis) (HCC)     Ganglion cyst     GERD (gastroesophageal reflux disease)     Headache     Hypertension     PONV (postoperative nausea and vomiting)     Popliteal synovial cyst, right 05/06/2024    Prediabetes 04/01/2022    Sinusitis     Thyroid cancer (HCC) 2016    Tonic pupil of right eye        Past Surgical History:   Procedure Laterality Date    BREAST BIOPSY Right 2003    excisional bx, benign    BUNIONECTOMY Bilateral     CARPAL TUNNEL RELEASE      CHOLECYSTECTOMY      COLONOSCOPY  11/02/2021    COLONOSCOPY POLYPECTOMY ABLATION performed by Wesley Lau MD at [facility] ENDOSCOPY    COLONOSCOPY  11/02/2021    COLONOSCOPY W/ ENDOSCOPIC MUCOSAL RESECTION performed by Wesley Lau MD at [facility] ENDOSCOPY    COLONOSCOPY  04/12/2022    COLONOSCOPY POLYPECTOMY SNARE/COLD BIOPSY performed by Wesley Lau MD at [facility] ENDOSCOPY    ENDOSCOPY, COLON, DIAGNOSTIC      GALLBLADDER SURGERY      HYSTERECTOMY (CERVIX STATUS UNKNOWN)      HYSTERECTOMY, VAGINAL      KNEE ARTHROSCOPY Right 5/22/2024    RIGHT KNEE ARTHROSCOPIC PARTIAL MEDIAL MENISCECTOMY AND BAKERS CYST EXCISON performed by Christopher Pinto MD at [facility] OR    SHOULDER SURGERY Right     THYROIDECTOMY      total

## 2025-01-15 NOTE — ED TRIAGE NOTES
Pt reports a mechanical fall 2 hrs ago, reports landing on the \"whole right side\", reports majority of the pain to the right wrist, right hip, and right knee. Reports she is unable to bear weigh to the right side. Denies hitting head, denies LOC, reports taking eliquis daily.     Patient oriented to room and ED throughput process.  Safety measures with ED bed locked in lowest position and call light in reach.  Patient educated on all orders, including any medications.  Patient educated on chief complaint/symptoms. Patient encouraged to ask questions regarding care, medications or treatment plan.  Patient aware of how to reach staff with questions/concerns.

## 2025-01-15 NOTE — ED NOTES
Pt was able to bear weight on right leg at this time and take a few assisted steps. Dr. Starr at bedside to offer CT scan to ensure no fracture. Pt declined. Pt educated on importance of watching for hematoma and to return to ED for worsening pain and decreasing mobility. Pt verbalized understanding.

## 2025-01-15 NOTE — DISCHARGE INSTRUCTIONS
Return to emergency department if worsening pain weakness numbness excessive bruising worse in any way or any problems.    RICE as discussed.    Rest as much as possible.  Minimal weightbearing.  Use wheelchair to assist with ambulation.    Please do not try to stand unassisted.    Hold your next dose of Eliquis

## 2025-01-15 NOTE — ED PROVIDER NOTES
use.  Thorax nontender no bruising crepitus deformity or rashes.  ABDOMEN: Soft.  Nontender nondistended positive bowel sounds.  No masses guarding rebound rigidity  EXTREMITIES: Pulses equal bilaterally.  Full range of motion all extremities.  Tenderness to the dorso lateral aspect of the right wrist and dorsum of the right hand.  Swelling is noted over the MCP joints of the right index and middle finger.  Decreased range of motion of the right hand is noted secondary to pain and swelling.  Motor sensorivascular intact.  Capillary refill is normal.  There is no snuffbox tenderness.  Examination of the right lower extremity reveals pain directly over the right hip and to a lesser extent to the right knee.  There is full range of motion of the right hip and knee actively and passively.  There is full internal/external rotation of the right hip.  There is no shortening or external rotation noted.  Pelvis is nontender.  Full range of motion is noted to all extremities at all levels.  All other extremities are normal.  MUSCULOSKELETAL: No midline bony tenderness.  No flank or CVA tenderness.  SKIN: Warm and dry. No acute rashes.   NEUROLOGICAL:   GCS 15.  Cranial nerves II through X intact.  No sensorimotor cerebellar deficits.  Gait is not tested.  PSYCHIATRIC: Normal mood and affect.    DIAGNOSTIC RESULTS     LABS:   Labs Reviewed - No data to display   When ordered only abnormal lab results are displayed. All other labs were within normal range or not returned as of this dictation.     RADIOLOGY:      Non-plain film images such as CT, Ultrasound and MRI are read by the radiologist. Plain radiographic images are visualized and preliminarily interpreted by the ED Provider with the below findings:   Interpretation per the Radiologist below, if available at the time of this note:  CT HEAD WO CONTRAST   Final Result      1.  No acute intracranial process.      Electronically signed by Jerome LANE WRIST RIGHT (MIN 3

## 2025-01-16 ENCOUNTER — HOSPITAL ENCOUNTER (OUTPATIENT)
Age: 65
Discharge: HOME | End: 2025-01-16
Attending: ORTHOPAEDIC SURGERY
Payer: COMMERCIAL

## 2025-01-16 ENCOUNTER — TELEPHONE (OUTPATIENT)
Dept: ORTHOPEDIC SURGERY | Age: 65
End: 2025-01-16

## 2025-01-16 ENCOUNTER — CARE COORDINATION (OUTPATIENT)
Dept: OTHER | Facility: CLINIC | Age: 65
End: 2025-01-16

## 2025-01-16 DIAGNOSIS — M25.551 RIGHT HIP PAIN: ICD-10-CM

## 2025-01-16 PROCEDURE — 73721 MRI JNT OF LWR EXTRE W/O DYE: CPT

## 2025-01-16 NOTE — CARE COORDINATION
Ambulatory Care Coordination Note     2025 10:18 AM     Patient Current Location:  [place]     This patient was received as a referral from Population health report .    ACM contacted the patient by telephone. Verified name and  with patient as identifiers. Provided introduction to self, and explanation of the ACM role.   Patient accepted care management services at this time.          ACM: Yuki Sprague RN     Challenges to be reviewed by the provider   Additional needs identified to be addressed with provider No  none               Method of communication with provider: none.    Utilization: N/A - Initial Call     Care Summary Note: Patient in ED on 25-1/15/25 for right hand injury and right hip injury. Patient states she followed up with Dr. Jhaveri yesterday in office. Patient is in a brace for right hand pain/5th MC shaft fracture. Patient is scheduled for MRI right hip. States she is still not able to bear any weight on right lower extremity. States pain is manageable with medications. States she is on Eliquis and is bruised head to toe. States her  is retired and their 24 year old daughter is at home to be able to help patient and offer support. States her  is watching for hematomas. 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 Patient will follow up with Dr. Jhaveri tomorrow to discuss MRI results. States she will follow up in 2 weeks with Dr. Brizuela for her right hand and will have xrays done at that time. Patient states she has no questions or concerns for me at this time.    Offered patient enrollment in the Remote Patient Monitoring (RPM) program for in-home monitoring: Patient is not eligible for RPM program because: insurance coverage.     Assessments Completed:   General Assessment    Do you have any symptoms that are causing concern?: Yes  Progression since Onset: Unchanged  Reported Symptoms: Pain (Comment: right

## 2025-01-16 NOTE — TELEPHONE ENCOUNTER
Appointment Request     Patient requesting earlier appointment: Yes  Appointment offered to patient: no  Patient Contact Number: [phone]     Patient calling to get in 1/17/25 for TR MRI      PLEASE CALL PATIENT AT THE ABOVE NUMBER            Quality 130: Documentation Of Current Medications In The Medical Record: Current Medications Documented Quality 402: Tobacco Use And Help With Quitting Among Adolescents: Patient screened for tobacco and never smoked Quality 431: Preventive Care And Screening: Unhealthy Alcohol Use - Screening: Patient not identified as an unhealthy alcohol user when screened for unhealthy alcohol use using a systematic screening method Detail Level: Detailed

## 2025-01-17 ENCOUNTER — OFFICE VISIT (OUTPATIENT)
Dept: ORTHOPEDIC SURGERY | Age: 65
End: 2025-01-17

## 2025-01-17 ENCOUNTER — TELEPHONE (OUTPATIENT)
Dept: ORTHOPEDIC SURGERY | Age: 65
End: 2025-01-17

## 2025-01-17 VITALS — WEIGHT: 196 LBS | BODY MASS INDEX: 33.46 KG/M2 | HEIGHT: 64 IN

## 2025-01-17 DIAGNOSIS — S32.401A CLOSED DISPLACED FRACTURE OF RIGHT ACETABULUM, UNSPECIFIED PORTION OF ACETABULUM, INITIAL ENCOUNTER (HCC): Primary | ICD-10-CM

## 2025-01-17 DIAGNOSIS — S62.316A CLOSED DISPLACED FRACTURE OF BASE OF FIFTH METACARPAL BONE OF RIGHT HAND, INITIAL ENCOUNTER: ICD-10-CM

## 2025-01-17 NOTE — TELEPHONE ENCOUNTER
General Question     Subject: PATIENT ISN'T SURE WHEN TO MAKE A FOLLOW UP FOR HER HAND.  Patient and /or Facility Request: Andria Mukherjee   Contact Number: [phone]

## 2025-01-18 PROBLEM — S32.401A CLOSED DISPLACED FRACTURE OF RIGHT ACETABULUM (HCC): Status: ACTIVE | Noted: 2025-01-18

## 2025-01-18 NOTE — PROGRESS NOTES
CHIEF COMPLAINT:   1- Right hand pain/ 5th MC shaft fracture.  2- Right hip pain/ acetabulum fracture.    DATE OF INJURY: 1/14/2025    HISTORY:  Ms. Mukherjee 65 y.o.  female right handed presents today for f/u evaluation of a right hand and hip injury which occurred when she fell onto her right side. She is complaining of ulnar hand pain and swelling. This is better with elevation and worse with ROM. The pain is sharp and not radiating. No other complaint. She was seen at [facility], where she was evaluated and splinted and asked to see orthopedics. She works as RN at [employer].    Past Medical History:   Diagnosis Date    Asthma     Carpal tunnel syndrome     Dental disease     Dizziness     DVT (deep venous thrombosis) ([facility])     Ganglion cyst     GERD (gastroesophageal reflux disease)     Headache     Hypertension     PONV (postoperative nausea and vomiting)     Popliteal synovial cyst, right 05/06/2024    Prediabetes 04/01/2022    Sinusitis     Thyroid cancer (HCC) 2016    Tonic pupil of right eye        Past Surgical History:   Procedure Laterality Date    BREAST BIOPSY Right 2003    excisional bx, benign    BUNIONECTOMY Bilateral     CARPAL TUNNEL RELEASE      CHOLECYSTECTOMY      COLONOSCOPY  11/02/2021    COLONOSCOPY POLYPECTOMY ABLATION performed by Wesley Lau MD at [facility] ENDOSCOPY    COLONOSCOPY  11/02/2021    COLONOSCOPY W/ ENDOSCOPIC MUCOSAL RESECTION performed by Wesley Lau MD at [facility] ENDOSCOPY    COLONOSCOPY  04/12/2022    COLONOSCOPY POLYPECTOMY SNARE/COLD BIOPSY performed by Wesley Lau MD at [facility] ENDOSCOPY    ENDOSCOPY, COLON, DIAGNOSTIC      GALLBLADDER SURGERY      HYSTERECTOMY (CERVIX STATUS UNKNOWN)      HYSTERECTOMY, VAGINAL      KNEE ARTHROSCOPY Right 5/22/2024    RIGHT KNEE ARTHROSCOPIC PARTIAL MEDIAL MENISCECTOMY AND BAKERS CYST EXCISON performed by Christopher Pinto MD at [facility] OR    SHOULDER SURGERY Right     THYROIDECTOMY      total    TONSILLECTOMY

## 2025-01-20 ENCOUNTER — TELEPHONE (OUTPATIENT)
Dept: ORTHOPEDIC SURGERY | Age: 65
End: 2025-01-20

## 2025-01-23 ENCOUNTER — CARE COORDINATION (OUTPATIENT)
Dept: OTHER | Facility: CLINIC | Age: 65
End: 2025-01-23

## 2025-01-23 NOTE — CARE COORDINATION
Ambulatory Care Coordination Note     2025 1:59 PM     Patient Current Location:  [place]     ACM contacted the patient by telephone. Verified name and  with patient as identifiers.     Patient graduated from the High Risk Care Management program on 2025.  Patient verbalizes confidence in the ability to self-manage at this time..  Care management goals have been completed. No further Ambulatory Care Manager follow up scheduled.      ACM: Yuki Sprague RN     Challenges to be reviewed by the provider   Additional needs identified to be addressed with provider No  none               Method of communication with provider: none.    Utilization: Patient has not had any utilization since our last call.     Care Summary Note: Patient states she is doing well. She had MRI right hip on 25. She followed up with Dr. Jhaveri on 25. States she has fractures in right hip. States she does not need surgery. She is doing PT exercises on her own at home. States Dr. Jhaveri did order her a walker but states the type of walker that was ordered was a specialized one and it was expensive. States she chose not to buy it at this time. States she is using a cane and her 's assistance to get around the home. States she also has a wheelchair. Patient states she is in an ulnar gutter brace on right hand. States bruising is improving. States she is monitoring for hematomas and does not have any hard areas. States she will follow up with Dr. Jhaveri on 25. States [employer] forms were filled out. States she is not sure how long she will be off work. She will also follow up with PCP tomorrow for her annual physical. Patient states she is doing well and denies further care management needs at this time. States she does have an upcoming surgery for breast reduction. Patient states she will also contact me if any needs arise.     Offered patient enrollment in the Remote Patient Monitoring (RPM) program for in-home

## 2025-01-24 ENCOUNTER — OFFICE VISIT (OUTPATIENT)
Dept: PRIMARY CARE CLINIC | Age: 65
End: 2025-01-24
Payer: COMMERCIAL

## 2025-01-24 DIAGNOSIS — J45.50 SEVERE PERSISTENT ASTHMA WITHOUT COMPLICATION: ICD-10-CM

## 2025-01-24 DIAGNOSIS — I10 PRIMARY HYPERTENSION: ICD-10-CM

## 2025-01-24 DIAGNOSIS — F33.0 MILD EPISODE OF RECURRENT MAJOR DEPRESSIVE DISORDER (HCC): ICD-10-CM

## 2025-01-24 DIAGNOSIS — Z00.00 EXAMINATION, MEDICAL, GENERAL: Primary | ICD-10-CM

## 2025-01-24 DIAGNOSIS — R73.03 PREDIABETES: ICD-10-CM

## 2025-01-24 LAB — HBA1C MFR BLD: 5.4 %

## 2025-01-24 PROCEDURE — 3079F DIAST BP 80-89 MM HG: CPT | Performed by: FAMILY MEDICINE

## 2025-01-24 PROCEDURE — 3075F SYST BP GE 130 - 139MM HG: CPT | Performed by: FAMILY MEDICINE

## 2025-01-24 PROCEDURE — 83036 HEMOGLOBIN GLYCOSYLATED A1C: CPT | Performed by: FAMILY MEDICINE

## 2025-01-24 PROCEDURE — 99396 PREV VISIT EST AGE 40-64: CPT | Performed by: FAMILY MEDICINE

## 2025-01-24 RX ORDER — LOSARTAN POTASSIUM 25 MG/1
25 TABLET ORAL DAILY
Qty: 90 TABLET | Refills: 1 | Status: SHIPPED | OUTPATIENT
Start: 2025-01-24

## 2025-01-24 RX ORDER — DULOXETIN HYDROCHLORIDE 60 MG/1
CAPSULE, DELAYED RELEASE ORAL
COMMUNITY
Start: 2024-12-18 | End: 2025-01-24 | Stop reason: SDUPTHER

## 2025-01-24 RX ORDER — HYDROXYCHLOROQUINE SULFATE 200 MG/1
TABLET, FILM COATED ORAL
COMMUNITY
Start: 2024-11-27

## 2025-01-24 RX ORDER — AMLODIPINE BESYLATE 10 MG/1
TABLET ORAL
Qty: 90 TABLET | Refills: 1 | Status: SHIPPED | OUTPATIENT
Start: 2025-01-24

## 2025-01-24 RX ORDER — NEOMYCIN SULFATE, POLYMYXIN B SULFATE AND HYDROCORTISONE 10; 3.5; 1 MG/ML; MG/ML; [USP'U]/ML
3 SUSPENSION/ DROPS AURICULAR (OTIC) 3 TIMES DAILY
Qty: 1 EACH | Refills: 0 | Status: SHIPPED | OUTPATIENT
Start: 2025-01-24

## 2025-01-24 RX ORDER — PREDNISONE 5 MG/1
TABLET ORAL
COMMUNITY
Start: 2024-12-24

## 2025-01-24 RX ORDER — DULOXETIN HYDROCHLORIDE 60 MG/1
60 CAPSULE, DELAYED RELEASE ORAL DAILY
Qty: 90 CAPSULE | Refills: 1 | Status: SHIPPED | OUTPATIENT
Start: 2025-01-24

## 2025-01-24 SDOH — ECONOMIC STABILITY: FOOD INSECURITY: WITHIN THE PAST 12 MONTHS, YOU WORRIED THAT YOUR FOOD WOULD RUN OUT BEFORE YOU GOT MONEY TO BUY MORE.: NEVER TRUE

## 2025-01-24 SDOH — ECONOMIC STABILITY: FOOD INSECURITY: WITHIN THE PAST 12 MONTHS, THE FOOD YOU BOUGHT JUST DIDN'T LAST AND YOU DIDN'T HAVE MONEY TO GET MORE.: NEVER TRUE

## 2025-01-24 ASSESSMENT — PATIENT HEALTH QUESTIONNAIRE - PHQ9
1. LITTLE INTEREST OR PLEASURE IN DOING THINGS: NOT AT ALL
2. FEELING DOWN, DEPRESSED OR HOPELESS: NOT AT ALL
SUM OF ALL RESPONSES TO PHQ QUESTIONS 1-9: 0
SUM OF ALL RESPONSES TO PHQ9 QUESTIONS 1 & 2: 0

## 2025-01-24 NOTE — PROGRESS NOTES
1/24/2025    Andria Mukherjee is a 65 y.o. female, here for a preventive medicine evaluation.    Sleep-  using pain med at night but can still wake with pain    Exercise-no exercise currently with recent fracture    Nutrition-has not been is dedicated to following her nutrition plan recently    Fell at home sustaining injury to her hip as well as her right hand.  Pain has improved but activity is quite limited                    Hypertension-no difficulty with her medication  Asthma-continues care per pulmonology overall stable  Depression-mood has been stable although certainly affected by her injury and pain    Health Maintenance   Topic Date Due    HIV screen  Never done    Respiratory Syncytial Virus (RSV) Pregnant or age 60 yrs+ (1 - Risk 60-74 years 1-dose series) Never done    Flu vaccine (1) 08/01/2024    COVID-19 Vaccine (4 - 2023-24 season) 09/01/2024    A1C test (Diabetic or Prediabetic)  01/24/2026    Depression Monitoring  01/24/2026    Breast cancer screen  12/17/2026    Lipids  10/05/2028    Colorectal Cancer Screen  04/12/2032    DTaP/Tdap/Td vaccine (3 - Td or Tdap) 06/10/2032    Shingles vaccine  Completed    Pneumococcal 0-64 years Vaccine  Completed    Hepatitis C screen  Completed    Hepatitis A vaccine  Aged Out    Hepatitis B vaccine  Aged Out    Hib vaccine  Aged Out    Polio vaccine  Aged Out    Meningococcal (ACWY) vaccine  Aged Out    Depression Screen  Discontinued    Diabetes screen  Discontinued     Immunization History   Administered Date(s) Administered    COVID-19, MODERNA BLUE border, Primary or Immunocompromised, (age 12y+), IM, 100 mcg/0.5mL 12/30/2020, 01/27/2021, 10/29/2021    Influenza Virus Vaccine 10/14/2020, 10/06/2021, 10/06/2021    Pneumococcal, PCV20, PREVNAR 20, (age 6w+), IM, 0.5mL 10/05/2023    Pneumococcal, PPSV23, PNEUMOVAX 23, (age 2y+), SC/IM, 0.5mL 05/03/2012    TDaP, ADACEL (age 10y-64y), BOOSTRIX (age 10y+), IM, 0.5mL 05/03/2012, 06/10/2022    Zoster

## 2025-01-29 VITALS
SYSTOLIC BLOOD PRESSURE: 138 MMHG | TEMPERATURE: 97.5 F | DIASTOLIC BLOOD PRESSURE: 88 MMHG | HEART RATE: 93 BPM | BODY MASS INDEX: 35.09 KG/M2 | WEIGHT: 204.4 LBS | RESPIRATION RATE: 18 BRPM | OXYGEN SATURATION: 98 %

## 2025-02-25 ENCOUNTER — TELEPHONE (OUTPATIENT)
Dept: ENDOCRINOLOGY | Age: 65
End: 2025-02-25

## 2025-02-25 DIAGNOSIS — E89.0 HYPOTHYROIDISM, POSTSURGICAL: ICD-10-CM

## 2025-02-25 DIAGNOSIS — R79.89 ELEVATED LIVER FUNCTION TESTS: ICD-10-CM

## 2025-02-25 DIAGNOSIS — Z78.0 MENOPAUSE: ICD-10-CM

## 2025-02-25 DIAGNOSIS — C73 THYROID CANCER (HCC): ICD-10-CM

## 2025-02-25 RX ORDER — LEVOTHYROXINE SODIUM 175 UG/1
175 TABLET ORAL DAILY
Qty: 90 TABLET | Refills: 0 | Status: SHIPPED | OUTPATIENT
Start: 2025-02-25

## 2025-02-25 NOTE — TELEPHONE ENCOUNTER
Fax from [facility]     New rx reqeust  Levothyroxine Sodium 175 mcg tabs

## 2025-02-27 ENCOUNTER — OFFICE VISIT (OUTPATIENT)
Dept: ORTHOPEDIC SURGERY | Age: 65
End: 2025-02-27
Payer: COMMERCIAL

## 2025-02-27 VITALS — BODY MASS INDEX: 34.83 KG/M2 | HEIGHT: 64 IN | WEIGHT: 204 LBS

## 2025-02-27 DIAGNOSIS — S32.401A CLOSED DISPLACED FRACTURE OF RIGHT ACETABULUM, UNSPECIFIED PORTION OF ACETABULUM, INITIAL ENCOUNTER (HCC): Primary | ICD-10-CM

## 2025-02-27 DIAGNOSIS — S62.316A CLOSED DISPLACED FRACTURE OF BASE OF FIFTH METACARPAL BONE OF RIGHT HAND, INITIAL ENCOUNTER: ICD-10-CM

## 2025-02-27 PROCEDURE — 99214 OFFICE O/P EST MOD 30 MIN: CPT | Performed by: ORTHOPAEDIC SURGERY

## 2025-02-27 NOTE — PROGRESS NOTES
CHIEF COMPLAINT:   1- Right hand pain/ 5th MC shaft fracture.  2- Right hip pain/ acetabulum fracture.    DATE OF INJURY: 1/14/2025    HISTORY:  Ms. Mukherjee 65 y.o.  female right handed presents today for f/u evaluation of a right hand and hip injury which occurred when she fell onto her right side. She is complaining of ulnar hand pain and swelling. This is better with elevation and worse with ROM. The pain is sharp and not radiating. No other complaint. She was seen at [facility], where she was evaluated and splinted and asked to see orthopedics. She works as RN at [employer].    Past Medical History:   Diagnosis Date    Asthma     Broken bones 01/2025    R hand broke    Broken hip (HCC) 01/2025    R hip broken    Carpal tunnel syndrome     Dental disease     Dizziness     DVT (deep venous thrombosis) ([facility])     Ganglion cyst     GERD (gastroesophageal reflux disease)     Headache     Hypertension     PONV (postoperative nausea and vomiting)     Popliteal synovial cyst, right 05/06/2024    Prediabetes 04/01/2022    Sinusitis     Thyroid cancer ([facility]) 2016    Tonic pupil of right eye        Past Surgical History:   Procedure Laterality Date    BREAST BIOPSY Right 2003    excisional bx, benign    BUNIONECTOMY Bilateral     CARPAL TUNNEL RELEASE      CHOLECYSTECTOMY      COLONOSCOPY  11/02/2021    COLONOSCOPY POLYPECTOMY ABLATION performed by Wesley Lau MD at [facility] ENDOSCOPY    COLONOSCOPY  11/02/2021    COLONOSCOPY W/ ENDOSCOPIC MUCOSAL RESECTION performed by Wesley Lau MD at [facility] ENDOSCOPY    COLONOSCOPY  04/12/2022    COLONOSCOPY POLYPECTOMY SNARE/COLD BIOPSY performed by Wesley Lau MD at [facility] ENDOSCOPY    ENDOSCOPY, COLON, DIAGNOSTIC      GALLBLADDER SURGERY      HYSTERECTOMY (CERVIX STATUS UNKNOWN)      HYSTERECTOMY, VAGINAL      KNEE ARTHROSCOPY Right 5/22/2024    RIGHT KNEE ARTHROSCOPIC PARTIAL MEDIAL MENISCECTOMY AND BAKERS CYST EXCISON performed by Christopher Pinto MD at

## 2025-02-28 ENCOUNTER — TELEPHONE (OUTPATIENT)
Dept: ORTHOPEDIC SURGERY | Age: 65
End: 2025-02-28

## 2025-02-28 ENCOUNTER — OFFICE VISIT (OUTPATIENT)
Dept: PRIMARY CARE CLINIC | Age: 65
End: 2025-02-28
Payer: COMMERCIAL

## 2025-02-28 VITALS
BODY MASS INDEX: 35.94 KG/M2 | WEIGHT: 209.4 LBS | TEMPERATURE: 97.4 F | HEART RATE: 91 BPM | RESPIRATION RATE: 20 BRPM | DIASTOLIC BLOOD PRESSURE: 88 MMHG | OXYGEN SATURATION: 97 % | SYSTOLIC BLOOD PRESSURE: 138 MMHG

## 2025-02-28 DIAGNOSIS — N62 MACROMASTIA: Primary | ICD-10-CM

## 2025-02-28 DIAGNOSIS — I10 PRIMARY HYPERTENSION: ICD-10-CM

## 2025-02-28 DIAGNOSIS — Z01.818 PREOP EXAMINATION: ICD-10-CM

## 2025-02-28 DIAGNOSIS — R73.03 PREDIABETES: ICD-10-CM

## 2025-02-28 DIAGNOSIS — J45.50 SEVERE PERSISTENT ASTHMA WITHOUT COMPLICATION (HCC): ICD-10-CM

## 2025-02-28 PROCEDURE — 3075F SYST BP GE 130 - 139MM HG: CPT | Performed by: FAMILY MEDICINE

## 2025-02-28 PROCEDURE — 3079F DIAST BP 80-89 MM HG: CPT | Performed by: FAMILY MEDICINE

## 2025-02-28 PROCEDURE — 99214 OFFICE O/P EST MOD 30 MIN: CPT | Performed by: FAMILY MEDICINE

## 2025-02-28 PROCEDURE — 36415 COLL VENOUS BLD VENIPUNCTURE: CPT | Performed by: FAMILY MEDICINE

## 2025-02-28 PROCEDURE — 93000 ELECTROCARDIOGRAM COMPLETE: CPT | Performed by: FAMILY MEDICINE

## 2025-02-28 ASSESSMENT — ENCOUNTER SYMPTOMS
SHORTNESS OF BREATH: 0
COUGH: 0
WHEEZING: 0
CHEST TIGHTNESS: 0

## 2025-02-28 NOTE — PROGRESS NOTES
membrane, ear canal and external ear normal.      Nose: Nose normal.      Mouth/Throat:      Mouth: Mucous membranes are moist.      Pharynx: Oropharynx is clear.   Eyes:      General: No scleral icterus.     Extraocular Movements: Extraocular movements intact.      Conjunctiva/sclera: Conjunctivae normal.      Pupils: Pupils are equal, round, and reactive to light.   Neck:      Thyroid: No thyroid mass, thyromegaly or thyroid tenderness.   Cardiovascular:      Rate and Rhythm: Normal rate and regular rhythm.      Heart sounds: Normal heart sounds.   Pulmonary:      Effort: Pulmonary effort is normal.      Breath sounds: Normal breath sounds. No wheezing, rhonchi or rales.   Abdominal:      Palpations: Abdomen is soft.      Tenderness: There is no abdominal tenderness. There is no guarding or rebound.   Musculoskeletal:      Cervical back: Neck supple. No rigidity. No muscular tenderness.      Right lower leg: No edema.      Left lower leg: No edema.   Lymphadenopathy:      Cervical: No cervical adenopathy.   Skin:     General: Skin is warm and dry.      Findings: No rash.   Neurological:      General: No focal deficit present.      Mental Status: She is alert and oriented to person, place, and time.      Cranial Nerves: No cranial nerve deficit.   Psychiatric:         Mood and Affect: Mood normal.         Behavior: Behavior normal.         Thought Content: Thought content normal.         Judgment: Judgment normal.         EKG Interpretation:  normal EKG, normal sinus rhythm, unchanged from previous tracings.    Lab Review Yes    ASSESSMENT:  Andria was seen today for pre-op exam.    Diagnoses and all orders for this visit:    Macromastia    Preop examination    Primary hypertension  -     EKG 12 Lead  -     Comprehensive Metabolic Panel  -     CBC    Prediabetes    Severe persistent asthma without complication (HCC)        65 y.o. patient approved for Surgery      PLAN:     1. Preoperative workup as follows: ECG,

## 2025-03-01 LAB
ALBUMIN SERPL-MCNC: 4.7 G/DL (ref 3.4–5)
ALBUMIN/GLOB SERPL: 2.1 {RATIO} (ref 1.1–2.2)
ALP SERPL-CCNC: 126 U/L (ref 40–129)
ALT SERPL-CCNC: 124 U/L (ref 10–40)
ANION GAP SERPL CALCULATED.3IONS-SCNC: 12 MMOL/L (ref 3–16)
AST SERPL-CCNC: 106 U/L (ref 15–37)
BILIRUB SERPL-MCNC: 0.5 MG/DL (ref 0–1)
BUN SERPL-MCNC: 19 MG/DL (ref 7–20)
CALCIUM SERPL-MCNC: 9.2 MG/DL (ref 8.3–10.6)
CHLORIDE SERPL-SCNC: 102 MMOL/L (ref 99–110)
CO2 SERPL-SCNC: 28 MMOL/L (ref 21–32)
CREAT SERPL-MCNC: 0.8 MG/DL (ref 0.6–1.2)
DEPRECATED RDW RBC AUTO: 13.7 % (ref 12.4–15.4)
GFR SERPLBLD CREATININE-BSD FMLA CKD-EPI: 82 ML/MIN/{1.73_M2}
GLUCOSE SERPL-MCNC: 122 MG/DL (ref 70–99)
HCT VFR BLD AUTO: 42.8 % (ref 36–48)
HGB BLD-MCNC: 14.2 G/DL (ref 12–16)
MCH RBC QN AUTO: 32.3 PG (ref 26–34)
MCHC RBC AUTO-ENTMCNC: 33.1 G/DL (ref 31–36)
MCV RBC AUTO: 97.6 FL (ref 80–100)
PLATELET # BLD AUTO: 276 K/UL (ref 135–450)
PMV BLD AUTO: 9.2 FL (ref 5–10.5)
POTASSIUM SERPL-SCNC: 5.2 MMOL/L (ref 3.5–5.1)
PROT SERPL-MCNC: 6.9 G/DL (ref 6.4–8.2)
RBC # BLD AUTO: 4.38 M/UL (ref 4–5.2)
SODIUM SERPL-SCNC: 142 MMOL/L (ref 136–145)
WBC # BLD AUTO: 7.1 K/UL (ref 4–11)

## 2025-03-07 NOTE — PROGRESS NOTES
Preop instructions were sent to the patient via [employer].    Emailed Dr. Auguste to inform him of patient's anesthesia history of asthma attack in pre op area, used her inhaler and breathing treatment. Then with induction had another asthma attack and bronchospasms. Had to be intubated and was successfully extubated , May 2024. Also informed him of the elevated potassium level, AST and ALT levels. Patient had DVT after knee surgery in May 2024 and was on Eliquis and completed the therapy 2/16/25.

## 2025-03-07 NOTE — PROGRESS NOTES
Email received from Dr. Auguste with order for BMP. No further orders nor documentation needed.  Call received from Dr. Garcia's office concerning this writer's call as no GARFIELD hose or compression boots ordered day of surgery. Spoke with Amina, she stated Dr. Garcia was out of the office today and will return on Monday. Dr. Garcia had also ordered for Clindamycin and patient states she had nausea and vomiting and burning in  abdomen when she takes this medication. Amina stated Dr. Garcia is out of the office today and will return on Monday. She will clarify these orders with him and refax new order sheet if needed.

## 2025-03-12 ENCOUNTER — HOSPITAL ENCOUNTER (OUTPATIENT)
Age: 65
Setting detail: OUTPATIENT SURGERY
Discharge: HOME | End: 2025-03-12
Attending: PLASTIC SURGERY | Admitting: PLASTIC SURGERY
Payer: COMMERCIAL

## 2025-03-12 ENCOUNTER — ANESTHESIA (OUTPATIENT)
Dept: OPERATING ROOM | Age: 65
End: 2025-03-12
Payer: COMMERCIAL

## 2025-03-12 ENCOUNTER — ANESTHESIA EVENT (OUTPATIENT)
Dept: OPERATING ROOM | Age: 65
End: 2025-03-12
Payer: COMMERCIAL

## 2025-03-12 VITALS
HEIGHT: 64 IN | WEIGHT: 210 LBS | TEMPERATURE: 98 F | DIASTOLIC BLOOD PRESSURE: 76 MMHG | BODY MASS INDEX: 35.85 KG/M2 | RESPIRATION RATE: 15 BRPM | OXYGEN SATURATION: 92 % | HEART RATE: 115 BPM | SYSTOLIC BLOOD PRESSURE: 115 MMHG

## 2025-03-12 DIAGNOSIS — N62 HYPERTROPHY OF BREAST: Primary | ICD-10-CM

## 2025-03-12 LAB
ANION GAP SERPL CALCULATED.3IONS-SCNC: 17 MMOL/L (ref 3–16)
BUN SERPL-MCNC: 16 MG/DL (ref 7–20)
CALCIUM SERPL-MCNC: 8.8 MG/DL (ref 8.3–10.6)
CHLORIDE SERPL-SCNC: 104 MMOL/L (ref 99–110)
CO2 SERPL-SCNC: 21 MMOL/L (ref 21–32)
CREAT SERPL-MCNC: 0.7 MG/DL (ref 0.6–1.2)
GFR SERPLBLD CREATININE-BSD FMLA CKD-EPI: >90 ML/MIN/{1.73_M2}
GLUCOSE SERPL-MCNC: 102 MG/DL (ref 70–99)
POTASSIUM SERPL-SCNC: 4 MMOL/L (ref 3.5–5.1)
SODIUM SERPL-SCNC: 142 MMOL/L (ref 136–145)

## 2025-03-12 PROCEDURE — 3700000001 HC ADD 15 MINUTES (ANESTHESIA): Performed by: PLASTIC SURGERY

## 2025-03-12 PROCEDURE — 6360000002 HC RX W HCPCS

## 2025-03-12 PROCEDURE — 7100000000 HC PACU RECOVERY - FIRST 15 MIN: Performed by: PLASTIC SURGERY

## 2025-03-12 PROCEDURE — 2709999900 HC NON-CHARGEABLE SUPPLY: Performed by: PLASTIC SURGERY

## 2025-03-12 PROCEDURE — 6360000002 HC RX W HCPCS: Performed by: ANESTHESIOLOGY

## 2025-03-12 PROCEDURE — 6360000002 HC RX W HCPCS: Performed by: NURSE ANESTHETIST, CERTIFIED REGISTERED

## 2025-03-12 PROCEDURE — 2500000003 HC RX 250 WO HCPCS: Performed by: NURSE ANESTHETIST, CERTIFIED REGISTERED

## 2025-03-12 PROCEDURE — 80048 BASIC METABOLIC PNL TOTAL CA: CPT

## 2025-03-12 PROCEDURE — 6360000002 HC RX W HCPCS: Performed by: PLASTIC SURGERY

## 2025-03-12 PROCEDURE — 3600000013 HC SURGERY LEVEL 3 ADDTL 15MIN: Performed by: PLASTIC SURGERY

## 2025-03-12 PROCEDURE — 7100000001 HC PACU RECOVERY - ADDTL 15 MIN: Performed by: PLASTIC SURGERY

## 2025-03-12 PROCEDURE — 3700000000 HC ANESTHESIA ATTENDED CARE: Performed by: PLASTIC SURGERY

## 2025-03-12 PROCEDURE — 2500000003 HC RX 250 WO HCPCS: Performed by: PLASTIC SURGERY

## 2025-03-12 PROCEDURE — 2580000003 HC RX 258: Performed by: PLASTIC SURGERY

## 2025-03-12 PROCEDURE — 7100000010 HC PHASE II RECOVERY - FIRST 15 MIN: Performed by: PLASTIC SURGERY

## 2025-03-12 PROCEDURE — 3600000003 HC SURGERY LEVEL 3 BASE: Performed by: PLASTIC SURGERY

## 2025-03-12 PROCEDURE — 6370000000 HC RX 637 (ALT 250 FOR IP): Performed by: ANESTHESIOLOGY

## 2025-03-12 PROCEDURE — 88305 TISSUE EXAM BY PATHOLOGIST: CPT

## 2025-03-12 PROCEDURE — 7100000011 HC PHASE II RECOVERY - ADDTL 15 MIN: Performed by: PLASTIC SURGERY

## 2025-03-12 RX ORDER — SODIUM CHLORIDE 9 MG/ML
INJECTION, SOLUTION INTRAVENOUS CONTINUOUS
Status: DISCONTINUED | OUTPATIENT
Start: 2025-03-12 | End: 2025-03-12 | Stop reason: HOSPADM

## 2025-03-12 RX ORDER — SODIUM CHLORIDE 9 MG/ML
INJECTION, SOLUTION INTRAVENOUS PRN
Status: DISCONTINUED | OUTPATIENT
Start: 2025-03-12 | End: 2025-03-12 | Stop reason: HOSPADM

## 2025-03-12 RX ORDER — OXYCODONE HYDROCHLORIDE 5 MG/1
5 TABLET ORAL PRN
Status: COMPLETED | OUTPATIENT
Start: 2025-03-12 | End: 2025-03-12

## 2025-03-12 RX ORDER — IPRATROPIUM BROMIDE AND ALBUTEROL SULFATE 2.5; .5 MG/3ML; MG/3ML
1 SOLUTION RESPIRATORY (INHALATION)
Status: DISCONTINUED | OUTPATIENT
Start: 2025-03-12 | End: 2025-03-12 | Stop reason: HOSPADM

## 2025-03-12 RX ORDER — BUPIVACAINE HYDROCHLORIDE 5 MG/ML
INJECTION, SOLUTION EPIDURAL; INTRACAUDAL
Status: COMPLETED | OUTPATIENT
Start: 2025-03-12 | End: 2025-03-12

## 2025-03-12 RX ORDER — ALBUTEROL SULFATE 0.83 MG/ML
2.5 SOLUTION RESPIRATORY (INHALATION) ONCE
Status: DISCONTINUED | OUTPATIENT
Start: 2025-03-12 | End: 2025-03-12 | Stop reason: HOSPADM

## 2025-03-12 RX ORDER — ROCURONIUM BROMIDE 10 MG/ML
INJECTION, SOLUTION INTRAVENOUS
Status: DISCONTINUED | OUTPATIENT
Start: 2025-03-12 | End: 2025-03-12 | Stop reason: SDUPTHER

## 2025-03-12 RX ORDER — MIDAZOLAM HYDROCHLORIDE 1 MG/ML
INJECTION, SOLUTION INTRAMUSCULAR; INTRAVENOUS
Status: DISCONTINUED
Start: 2025-03-12 | End: 2025-03-12 | Stop reason: HOSPADM

## 2025-03-12 RX ORDER — SUCCINYLCHOLINE/SOD CL,ISO/PF 200MG/10ML
SYRINGE (ML) INTRAVENOUS
Status: DISCONTINUED | OUTPATIENT
Start: 2025-03-12 | End: 2025-03-12 | Stop reason: SDUPTHER

## 2025-03-12 RX ORDER — FENTANYL CITRATE 50 UG/ML
INJECTION, SOLUTION INTRAMUSCULAR; INTRAVENOUS
Status: DISCONTINUED | OUTPATIENT
Start: 2025-03-12 | End: 2025-03-12 | Stop reason: SDUPTHER

## 2025-03-12 RX ORDER — PROPOFOL 10 MG/ML
INJECTION, EMULSION INTRAVENOUS
Status: DISCONTINUED | OUTPATIENT
Start: 2025-03-12 | End: 2025-03-12 | Stop reason: SDUPTHER

## 2025-03-12 RX ORDER — MIDAZOLAM HYDROCHLORIDE 2 MG/2ML
1 INJECTION, SOLUTION INTRAMUSCULAR; INTRAVENOUS ONCE
Status: COMPLETED | OUTPATIENT
Start: 2025-03-12 | End: 2025-03-12

## 2025-03-12 RX ORDER — DEXAMETHASONE SODIUM PHOSPHATE 4 MG/ML
INJECTION, SOLUTION INTRA-ARTICULAR; INTRALESIONAL; INTRAMUSCULAR; INTRAVENOUS; SOFT TISSUE
Status: DISCONTINUED | OUTPATIENT
Start: 2025-03-12 | End: 2025-03-12 | Stop reason: SDUPTHER

## 2025-03-12 RX ORDER — IPRATROPIUM BROMIDE AND ALBUTEROL SULFATE 2.5; .5 MG/3ML; MG/3ML
SOLUTION RESPIRATORY (INHALATION)
Status: DISCONTINUED
Start: 2025-03-12 | End: 2025-03-12 | Stop reason: HOSPADM

## 2025-03-12 RX ORDER — LIDOCAINE HYDROCHLORIDE 10 MG/ML
0.5 INJECTION, SOLUTION EPIDURAL; INFILTRATION; INTRACAUDAL; PERINEURAL ONCE
Status: DISCONTINUED | OUTPATIENT
Start: 2025-03-12 | End: 2025-03-12 | Stop reason: HOSPADM

## 2025-03-12 RX ORDER — OXYCODONE AND ACETAMINOPHEN 7.5; 325 MG/1; MG/1
1 TABLET ORAL EVERY 4 HOURS PRN
Qty: 25 TABLET | Refills: 0 | Status: SHIPPED | OUTPATIENT
Start: 2025-03-12 | End: 2025-03-19

## 2025-03-12 RX ORDER — SODIUM CHLORIDE 0.9 % (FLUSH) 0.9 %
5-40 SYRINGE (ML) INJECTION EVERY 12 HOURS SCHEDULED
Status: DISCONTINUED | OUTPATIENT
Start: 2025-03-12 | End: 2025-03-12 | Stop reason: HOSPADM

## 2025-03-12 RX ORDER — ONDANSETRON 2 MG/ML
INJECTION INTRAMUSCULAR; INTRAVENOUS
Status: DISCONTINUED | OUTPATIENT
Start: 2025-03-12 | End: 2025-03-12 | Stop reason: SDUPTHER

## 2025-03-12 RX ORDER — NALOXONE HYDROCHLORIDE 0.4 MG/ML
INJECTION, SOLUTION INTRAMUSCULAR; INTRAVENOUS; SUBCUTANEOUS PRN
Status: DISCONTINUED | OUTPATIENT
Start: 2025-03-12 | End: 2025-03-12 | Stop reason: HOSPADM

## 2025-03-12 RX ORDER — HYDROMORPHONE HYDROCHLORIDE 2 MG/ML
INJECTION, SOLUTION INTRAMUSCULAR; INTRAVENOUS; SUBCUTANEOUS
Status: DISCONTINUED | OUTPATIENT
Start: 2025-03-12 | End: 2025-03-12 | Stop reason: SDUPTHER

## 2025-03-12 RX ORDER — OXYCODONE HYDROCHLORIDE 5 MG/1
10 TABLET ORAL PRN
Status: COMPLETED | OUTPATIENT
Start: 2025-03-12 | End: 2025-03-12

## 2025-03-12 RX ORDER — CEPHALEXIN 500 MG/1
500 CAPSULE ORAL 4 TIMES DAILY
Qty: 28 CAPSULE | Refills: 0 | Status: SHIPPED | OUTPATIENT
Start: 2025-03-12 | End: 2025-03-19

## 2025-03-12 RX ORDER — SODIUM CHLORIDE 0.9 % (FLUSH) 0.9 %
5-40 SYRINGE (ML) INJECTION PRN
Status: DISCONTINUED | OUTPATIENT
Start: 2025-03-12 | End: 2025-03-12 | Stop reason: HOSPADM

## 2025-03-12 RX ORDER — KETOROLAC TROMETHAMINE 30 MG/ML
INJECTION, SOLUTION INTRAMUSCULAR; INTRAVENOUS
Status: DISCONTINUED | OUTPATIENT
Start: 2025-03-12 | End: 2025-03-12 | Stop reason: SDUPTHER

## 2025-03-12 RX ORDER — LIDOCAINE HYDROCHLORIDE 20 MG/ML
INJECTION, SOLUTION INFILTRATION; PERINEURAL
Status: DISCONTINUED | OUTPATIENT
Start: 2025-03-12 | End: 2025-03-12 | Stop reason: SDUPTHER

## 2025-03-12 RX ORDER — MIDAZOLAM HYDROCHLORIDE 1 MG/ML
INJECTION, SOLUTION INTRAMUSCULAR; INTRAVENOUS
Status: DISCONTINUED | OUTPATIENT
Start: 2025-03-12 | End: 2025-03-12 | Stop reason: SDUPTHER

## 2025-03-12 RX ORDER — PHENYLEPHRINE HCL IN 0.9% NACL 1 MG/10 ML
SYRINGE (ML) INTRAVENOUS
Status: DISCONTINUED | OUTPATIENT
Start: 2025-03-12 | End: 2025-03-12 | Stop reason: SDUPTHER

## 2025-03-12 RX ORDER — HYDROMORPHONE HYDROCHLORIDE 2 MG/ML
0.25 INJECTION, SOLUTION INTRAMUSCULAR; INTRAVENOUS; SUBCUTANEOUS EVERY 5 MIN PRN
Status: COMPLETED | OUTPATIENT
Start: 2025-03-12 | End: 2025-03-12

## 2025-03-12 RX ORDER — ALBUTEROL SULFATE 0.83 MG/ML
SOLUTION RESPIRATORY (INHALATION)
Status: COMPLETED
Start: 2025-03-12 | End: 2025-03-12

## 2025-03-12 RX ADMIN — ONDANSETRON 4 MG: 2 INJECTION, SOLUTION INTRAMUSCULAR; INTRAVENOUS at 08:08

## 2025-03-12 RX ADMIN — SUGAMMADEX 200 MG: 100 INJECTION, SOLUTION INTRAVENOUS at 10:09

## 2025-03-12 RX ADMIN — ROCURONIUM BROMIDE 10 MG: 10 INJECTION INTRAVENOUS at 07:44

## 2025-03-12 RX ADMIN — SODIUM CHLORIDE 1500 MG: 0.9 INJECTION, SOLUTION INTRAVENOUS at 07:04

## 2025-03-12 RX ADMIN — HYDROMORPHONE HYDROCHLORIDE 0.5 MG: 2 INJECTION, SOLUTION INTRAMUSCULAR; INTRAVENOUS; SUBCUTANEOUS at 09:10

## 2025-03-12 RX ADMIN — DEXAMETHASONE SODIUM PHOSPHATE 8 MG: 4 INJECTION, SOLUTION INTRAMUSCULAR; INTRAVENOUS at 08:04

## 2025-03-12 RX ADMIN — MIDAZOLAM 2 MG: 1 INJECTION INTRAMUSCULAR; INTRAVENOUS at 07:34

## 2025-03-12 RX ADMIN — HYDROMORPHONE HYDROCHLORIDE 1 MG: 2 INJECTION, SOLUTION INTRAMUSCULAR; INTRAVENOUS; SUBCUTANEOUS at 07:51

## 2025-03-12 RX ADMIN — FENTANYL CITRATE 100 MCG: 50 INJECTION, SOLUTION INTRAMUSCULAR; INTRAVENOUS at 07:44

## 2025-03-12 RX ADMIN — LIDOCAINE HYDROCHLORIDE 50 MG: 20 INJECTION, SOLUTION INFILTRATION; PERINEURAL at 07:44

## 2025-03-12 RX ADMIN — ROCURONIUM BROMIDE 20 MG: 10 INJECTION INTRAVENOUS at 07:46

## 2025-03-12 RX ADMIN — ROCURONIUM BROMIDE 10 MG: 10 INJECTION INTRAVENOUS at 08:35

## 2025-03-12 RX ADMIN — Medication 160 MG: at 07:44

## 2025-03-12 RX ADMIN — Medication 100 MCG: at 08:31

## 2025-03-12 RX ADMIN — ROCURONIUM BROMIDE 10 MG: 10 INJECTION INTRAVENOUS at 08:13

## 2025-03-12 RX ADMIN — SODIUM CHLORIDE: 0.9 INJECTION, SOLUTION INTRAVENOUS at 07:03

## 2025-03-12 RX ADMIN — HYDROMORPHONE HYDROCHLORIDE 0.25 MG: 2 INJECTION, SOLUTION INTRAMUSCULAR; INTRAVENOUS; SUBCUTANEOUS at 11:09

## 2025-03-12 RX ADMIN — ROCURONIUM BROMIDE 10 MG: 10 INJECTION INTRAVENOUS at 08:53

## 2025-03-12 RX ADMIN — KETOROLAC TROMETHAMINE 30 MG: 60 INJECTION, SOLUTION INTRAMUSCULAR at 09:10

## 2025-03-12 RX ADMIN — HYDROMORPHONE HYDROCHLORIDE 0.5 MG: 2 INJECTION, SOLUTION INTRAMUSCULAR; INTRAVENOUS; SUBCUTANEOUS at 09:03

## 2025-03-12 RX ADMIN — PROPOFOL 180 MG: 10 INJECTION, EMULSION INTRAVENOUS at 07:44

## 2025-03-12 RX ADMIN — OXYCODONE 5 MG: 5 TABLET ORAL at 11:29

## 2025-03-12 RX ADMIN — ALBUTEROL SULFATE 2.5 MG: 2.5 SOLUTION RESPIRATORY (INHALATION) at 10:30

## 2025-03-12 RX ADMIN — ROCURONIUM BROMIDE 10 MG: 10 INJECTION INTRAVENOUS at 07:54

## 2025-03-12 RX ADMIN — HYDROMORPHONE HYDROCHLORIDE 0.25 MG: 2 INJECTION, SOLUTION INTRAMUSCULAR; INTRAVENOUS; SUBCUTANEOUS at 10:51

## 2025-03-12 RX ADMIN — MIDAZOLAM 1 MG: 1 INJECTION INTRAMUSCULAR; INTRAVENOUS at 10:31

## 2025-03-12 ASSESSMENT — PAIN - FUNCTIONAL ASSESSMENT
PAIN_FUNCTIONAL_ASSESSMENT: WONG-BAKER FACES
PAIN_FUNCTIONAL_ASSESSMENT: 0-10

## 2025-03-12 ASSESSMENT — PAIN DESCRIPTION - LOCATION
LOCATION: BREAST

## 2025-03-12 ASSESSMENT — PAIN DESCRIPTION - ORIENTATION
ORIENTATION: RIGHT;LEFT

## 2025-03-12 ASSESSMENT — PAIN SCALES - GENERAL
PAINLEVEL_OUTOF10: 8
PAINLEVEL_OUTOF10: 7
PAINLEVEL_OUTOF10: 8

## 2025-03-12 ASSESSMENT — PAIN DESCRIPTION - DESCRIPTORS: DESCRIPTORS: ACHING;BURNING;SHARP

## 2025-03-12 NOTE — DISCHARGE INSTRUCTIONS
Call Dr. Garcia for any problems and to schedule follow-up appointment #[phone]      General guidelines   Rest when you feel tired  You will have a surgical bra on when you wake up. Please wear this day and night until your postoperative appointment. It can be removed for laundering and for showers. This helps immobilize the breast to alleviate discomfort as well as maintain pressure to avoid postoperative seroma.      Pain management  You may feel mild to moderate discomfort when anesthesia wears off  You will be given a prescription for a narcotic pain medication  Some patients experience very little discomfort and they prefer not to use the narcotic  You may take Tylenol or extra strength Tylenol instead of the narcotic  Many narcotics also contained Tylenol so you should not take both  AVOID aspirin, fish oil, Excedrin, Motrin, ibuprofen, and other NSAIDS immediately after surgery for at least 48-72 hours.  Anticoagulation such as warfarin can typically resume 48 hours after surgery.  This should be discussed with your surgeon and prescribing physician.  Narcotic medication may cause constipation. Make sure to keep well hydrated, ambulate, and you may eat high-fiber foods to help avoid constipation. You may use over-the-counter medications for constipation.  You should not consume alcohol while taking narcotics  You should not drive while taking narcotics  Pain should improve, not worsen as days pass. If pain worsens, please call the office immediately.    Wound care  Your incision has dissolvable stitches under the skin and surgical strength skin glue. You are also covered with a surgical bra and fluff padding.  Wearing the bra helps reduce swelling and pain. Please wear it day and night until your postoperative appointment with the exception of laundering and bathing. If the surgical bra is uncomfortable for you, please wear a sports bra that applies compression.  Do not place ointment or lotions on your

## 2025-03-12 NOTE — H&P
I have reviewed the history and physical and examined the patient and find no relevant changes.    I have reviewed with the patient and/or family the risks, benefits, and alternatives to the procedure.    Zofran [ondansetron], Lisinopril, Naproxen, Penicillins, Rofecoxib, Clindamycin, Demerol hcl [meperidine], and Sulfa antibiotics    Last recorded vitals:  BP (!) 138/90   Pulse 95   Temp 98.1 °F (36.7 °C) (Oral)   Resp 16   Ht 1.626 m (5' 4\")   Wt 95.3 kg (210 lb)   LMP 02/10/2003   SpO2 95%   BMI 36.05 kg/m²     Past Surgical History:   Procedure Laterality Date    BREAST BIOPSY Right 2003    excisional bx, benign    BUNIONECTOMY Bilateral     CARPAL TUNNEL RELEASE Bilateral     CHOLECYSTECTOMY      COLONOSCOPY  11/02/2021    COLONOSCOPY POLYPECTOMY ABLATION performed by Wesley Lau MD at [facility] ENDOSCOPY    COLONOSCOPY  11/02/2021    COLONOSCOPY W/ ENDOSCOPIC MUCOSAL RESECTION performed by Wesley Lau MD at [facility] ENDOSCOPY    COLONOSCOPY  04/12/2022    COLONOSCOPY POLYPECTOMY SNARE/COLD BIOPSY performed by Wesley Lau MD at [facility] ENDOSCOPY    ENDOSCOPY, COLON, DIAGNOSTIC      GALLBLADDER SURGERY      HYSTERECTOMY (CERVIX STATUS UNKNOWN)      HYSTERECTOMY, VAGINAL      KNEE ARTHROSCOPY Right 05/22/2024    RIGHT KNEE ARTHROSCOPIC PARTIAL MEDIAL MENISCECTOMY AND BAKERS CYST EXCISON performed by Christopher Pinto MD at [facility] OR    SHOULDER SURGERY Right     x2    THYROIDECTOMY      total    TONSILLECTOMY      WISDOM TOOTH EXTRACTION         Prior to Admission medications    Medication Sig Start Date End Date Taking? Authorizing Provider   levothyroxine (SYNTHROID) 175 MCG tablet Take 1 tablet by mouth Daily  Patient taking differently: Take 150 mcg by mouth Daily 2/25/25  Yes Celia Swartz MD   hydroxychloroquine (PLAQUENIL) 200 MG tablet 1 tablet 2 times daily 11/27/24  Yes ProviderMonica MD   predniSONE (DELTASONE) 5 MG tablet Take 1 tablet by mouth daily 12/24/24  Yes Provider 
SAMUEL/ALONDRA  Job #:  [id number]     Doc#:  [id number]

## 2025-03-12 NOTE — OP NOTE
[address] [address]                            OPERATIVE REPORT      PATIENT NAME: MANAV EVANGELISTA            : 1960  MED REC NO: [id number]                      ROOM: ASC OR  ACCOUNT NO: [id number]                       ADMIT DATE: 2025  PROVIDER: Efrain Garcia MD      DATE OF PROCEDURE:  2025    SURGEON:  Efrain Garcia MD    PREOPERATIVE DIAGNOSIS:  Bilateral symptomatic macromastia.    POSTOPERATIVE DIAGNOSIS:  Bilateral symptomatic macromastia.    PROCEDURE:  Bilateral reduction mammoplasty using modified inferior pedicle technique.    ANESTHESIA:  General.    BLOOD LOSS:  Less than 50 mL.    SPECIMEN:  1195 g on the right and 1180 g on the left.    INDICATIONS:  A 65-year-old white female presents with significant bilateral symptomatic macromastia.  Symptom complex includes back, neck, and shoulder discomfort attributed to large pendulous breasts.  After failing all efforts at conservative management, elected to proceed with definitive reduction mammoplasty.  Issue of bleeding, infection, wound dehiscence, scarring as well as the issue of asymmetry and total or partial loss of nipple complex discussed, and consent was obtained.  In fact, detailed consent form can be found in the chart.    DESCRIPTION OF PROCEDURE:  The patient was taken to the operative room after appropriate preoperative markings.  This was begun by identifying the midline as well as the breast meridian from midclavicular line.  New nipple position transposed anteriorly at the inframammary crease elevating 1 cm.  The Wise keyhole cutter placed over the area with the medial and lateral limbs being 5 cm in length, pedicle was 6 mm in width.  Again after appropriate general endotracheal anesthesia, chest sterilely prepped and draped in the usual fashion using ChloraPrep solution.  We then placed a 42 mm cookie cutter over the right nipple complex.

## 2025-03-12 NOTE — ANESTHESIA POSTPROCEDURE EVALUATION
Department of Anesthesiology  Postprocedure Note    Patient: Andria Mukherjee  MRN: [id number]  YOB: 1960  Date of evaluation: 3/12/2025    Procedure Summary       Date: 03/12/25 Room / Location: [address]    Anesthesia Start: 0736 Anesthesia Stop: 1029    Procedure: BILATERAL BREAST REDUCTION (Bilateral: Chest) Diagnosis:       Hypertrophy of breast      (Hypertrophy of breast [N62])    Surgeons: Efrain Garcia MD Responsible Provider: Peterson Gayle DO    Anesthesia Type: general ASA Status: 3            Anesthesia Type: No value filed.    Mingo Phase I: Mingo Score: 10    Mingo Phase II:      Anesthesia Post Evaluation    Patient location during evaluation: PACU  Patient participation: complete - patient participated  Level of consciousness: awake  Pain score: 0  Airway patency: patent  Nausea & Vomiting: no nausea and no vomiting  Cardiovascular status: blood pressure returned to baseline  Respiratory status: acceptable  Hydration status: euvolemic  Multimodal analgesia pain management approach  Pain management: adequate    No notable events documented.

## 2025-03-12 NOTE — ANESTHESIA PRE PROCEDURE
Procedure Laterality Date    BREAST BIOPSY Right     excisional bx, benign    BUNIONECTOMY Bilateral     CARPAL TUNNEL RELEASE Bilateral     CHOLECYSTECTOMY      COLONOSCOPY  2021    COLONOSCOPY POLYPECTOMY ABLATION performed by Wesley Lau MD at [facility] ENDOSCOPY    COLONOSCOPY  2021    COLONOSCOPY W/ ENDOSCOPIC MUCOSAL RESECTION performed by Wesley Lau MD at [facility] ENDOSCOPY    COLONOSCOPY  2022    COLONOSCOPY POLYPECTOMY SNARE/COLD BIOPSY performed by Wesley Lau MD at [facility] ENDOSCOPY    ENDOSCOPY, COLON, DIAGNOSTIC      GALLBLADDER SURGERY      HYSTERECTOMY (CERVIX STATUS UNKNOWN)      HYSTERECTOMY, VAGINAL      KNEE ARTHROSCOPY Right 2024    RIGHT KNEE ARTHROSCOPIC PARTIAL MEDIAL MENISCECTOMY AND BAKERS CYST EXCISON performed by Christopher Pinto MD at [facility] OR    SHOULDER SURGERY Right     x2    THYROIDECTOMY      total    TONSILLECTOMY      WISDOM TOOTH EXTRACTION         Social History:    Social History     Tobacco Use    Smoking status: Former     Current packs/day: 0.00     Average packs/day: 0.5 packs/day for 10.0 years (5.0 ttl pk-yrs)     Types: Cigarettes     Start date: 1980     Quit date: 1990     Years since quittin.2    Smokeless tobacco: Never   Substance Use Topics    Alcohol use: Yes     Alcohol/week: 14.0 standard drinks of alcohol     Types: 14 Glasses of wine per week                                Counseling given: Not Answered      Vital Signs (Current):   Vitals:    25 1027 25 0639   BP:  (!) 138/90   Pulse:  95   Resp:  16   Temp:  98.1 °F (36.7 °C)   TempSrc:  Oral   SpO2:  95%   Weight: 90.7 kg (200 lb) 95.3 kg (210 lb)   Height: 1.626 m (5' 4\") 1.626 m (5' 4\")                                              BP Readings from Last 3 Encounters:   25 (!) 138/90   25 138/88   25 138/88       NPO Status: Time of last liquid consumption: 0000                        Time of last solid consumption: 0000

## 2025-03-12 NOTE — PROGRESS NOTES
IV d/óscar/dressed , tolerated fluids well & stated incisional pain under control. D/óscar via w/c , denied need to void prior to dismissal.

## 2025-03-12 NOTE — OP NOTE
Operative Note      Patient: Andria Mukherjee  YOB: 1960  MRN: [id number]    Date of Procedure: 3/12/2025    Pre-Op Diagnosis Codes:      * Hypertrophy of breast [N62]    Post-Op Diagnosis: Same       Procedure(s):  BILATERAL BREAST REDUCTION    Surgeon(s):  Efrain Garcia MD    Assistant:   Surgical Assistant: Bertha Vale Assistant: Thony Garcias RN    Anesthesia: General    Estimated Blood Loss (mL): less than 50     Complications: None    Specimens:   ID Type Source Tests Collected by Time Destination   A : A. Right Breast Tissue 1195.5g Tissue Tissue SURGICAL PATHOLOGY Efrain Garcia MD 3/12/2025 0829    B : B. Left Breast Tissue Tissue Tissue SURGICAL PATHOLOGY Efrain Garcia MD 3/12/2025 0914        Implants:  * No implants in log *      Drains: * No LDAs found *    Findings:  Infection Present At Time Of Surgery (PATOS) (choose all levels that have infection present):  No infection present  Other Findings:     Detailed Description of Procedure:       Electronically signed by Efrain Garcia MD on 3/12/2025 at 9:22 AM

## 2025-03-12 NOTE — BRIEF OP NOTE
Brief Postoperative Note      Patient: Andria Mukherjee  YOB: 1960  MRN: [id number]    Date of Procedure: 3/12/2025    Pre-Op Diagnosis Codes:      * Hypertrophy of breast [N62]    Post-Op Diagnosis: Same       Procedure(s):  BILATERAL BREAST REDUCTION    Surgeon(s):  Efrain Garcia MD    Assistant:  Surgical Assistant: Bertha Vale Assistant: Thony Garcias RN    Anesthesia: General    Estimated Blood Loss (mL): less than 50     Complications: None    Specimens:   ID Type Source Tests Collected by Time Destination   A : A. Right Breast Tissue 1195.5g Tissue Tissue SURGICAL PATHOLOGY Efrain Garcia MD 3/12/2025 0829    B : B. Left Breast Tissue Tissue Tissue SURGICAL PATHOLOGY Efrain Garcia MD 3/12/2025 0914        Implants:  * No implants in log *      Drains: * No LDAs found *    Findings:  Infection Present At Time Of Surgery (PATOS) (choose all levels that have infection present):  No infection present  Other Findings:     Electronically signed by Efrain Garcia MD on 3/12/2025 at 9:18 AM

## 2025-06-24 DIAGNOSIS — I10 PRIMARY HYPERTENSION: ICD-10-CM

## 2025-06-24 RX ORDER — AMLODIPINE BESYLATE 10 MG/1
TABLET ORAL
Qty: 90 TABLET | Refills: 1 | OUTPATIENT
Start: 2025-06-24

## 2025-06-24 NOTE — TELEPHONE ENCOUNTER
Patient is scheduled before medication is due.    Medication:   Requested Prescriptions     Pending Prescriptions Disp Refills    amLODIPine (NORVASC) 10 MG tablet 90 tablet 1     Sig: Take 1 tablet by mouth daily        Last Filled:  01/24/2025 #90 with 1 refill    Patient Phone Number: [phone] (home)     Last appt: 2/28/2025   Next appt: 7/15/2025    Last OARRS:       1/15/2025    12:23 AM   RX Monitoring   Periodic Controlled Substance Monitoring No signs of potential drug abuse or diversion identified.

## 2025-07-15 ENCOUNTER — OFFICE VISIT (OUTPATIENT)
Dept: PRIMARY CARE CLINIC | Age: 65
End: 2025-07-15

## 2025-07-15 VITALS
OXYGEN SATURATION: 97 % | TEMPERATURE: 97.2 F | DIASTOLIC BLOOD PRESSURE: 88 MMHG | HEART RATE: 87 BPM | RESPIRATION RATE: 20 BRPM | BODY MASS INDEX: 35.33 KG/M2 | WEIGHT: 205.8 LBS | SYSTOLIC BLOOD PRESSURE: 138 MMHG

## 2025-07-15 DIAGNOSIS — F33.0 MILD EPISODE OF RECURRENT MAJOR DEPRESSIVE DISORDER: ICD-10-CM

## 2025-07-15 DIAGNOSIS — I10 PRIMARY HYPERTENSION: Primary | ICD-10-CM

## 2025-07-15 DIAGNOSIS — Z00.00 EXAMINATION, MEDICAL, GENERAL: ICD-10-CM

## 2025-07-15 RX ORDER — LOSARTAN POTASSIUM 25 MG/1
25 TABLET ORAL DAILY
Qty: 90 TABLET | Refills: 1 | Status: SHIPPED | OUTPATIENT
Start: 2025-07-15

## 2025-07-15 RX ORDER — DULOXETIN HYDROCHLORIDE 60 MG/1
60 CAPSULE, DELAYED RELEASE ORAL DAILY
Qty: 90 CAPSULE | Refills: 1 | Status: SHIPPED | OUTPATIENT
Start: 2025-07-15 | End: 2025-07-15 | Stop reason: SDUPTHER

## 2025-07-15 RX ORDER — LOSARTAN POTASSIUM 25 MG/1
25 TABLET ORAL DAILY
Qty: 90 TABLET | Refills: 1 | Status: SHIPPED | OUTPATIENT
Start: 2025-07-15 | End: 2025-07-15 | Stop reason: SDUPTHER

## 2025-07-15 RX ORDER — AMLODIPINE BESYLATE 10 MG/1
TABLET ORAL
Qty: 90 TABLET | Refills: 1 | Status: SHIPPED | OUTPATIENT
Start: 2025-07-15

## 2025-07-15 RX ORDER — DULOXETIN HYDROCHLORIDE 60 MG/1
60 CAPSULE, DELAYED RELEASE ORAL DAILY
Qty: 90 CAPSULE | Refills: 1 | Status: SHIPPED | OUTPATIENT
Start: 2025-07-15

## 2025-07-15 RX ORDER — AMLODIPINE BESYLATE 10 MG/1
TABLET ORAL
Qty: 90 TABLET | Refills: 1 | Status: SHIPPED | OUTPATIENT
Start: 2025-07-15 | End: 2025-07-15 | Stop reason: SDUPTHER

## 2025-07-15 NOTE — PROGRESS NOTES
PROGRESS NOTE  Date of Service:  7/15/2025    SUBJECTIVE:  Patient ID: Andria Mukherjee is a 65 y.o. female    ASSESSMENT  1. Primary hypertension    2. Mild episode of recurrent major depressive disorder    3. Examination, medical, general        PLAN:   1. Primary hypertension  -     amLODIPine (NORVASC) 10 MG tablet; Take 1 tablet by mouth daily, Disp-90 tablet, R-1Normal  -     losartan (COZAAR) 25 MG tablet; Take 1 tablet by mouth daily, Disp-90 tablet, R-1Normal  2. Mild episode of recurrent major depressive disorder  -     DULoxetine (CYMBALTA) 60 MG extended release capsule; Take 1 capsule by mouth daily, Disp-90 capsule, R-1Normal     Assessment & Plan  1. Post-breast surgery recovery.  - Reports a hard area on one side of her breast post-surgery.  - Will follow up with her surgeon to evaluate the hardness.  - Monitoring for changes and further evaluation by the surgeon.    2 Breathing issues.  - Increased use of inhaler due to breathing difficulties, especially in hot and humid environments.  - Will continue to use inhaler as needed.  - Follow up with Dr. Rios for ongoing management.  - No changes in current medication regimen.    3. Dry eyes.  - Reports severe dry eyes.  - Eye exam conducted two weeks ago showed eyes are in good condition with a small cataract starting.  - Started on eye drops for dry eyes.  - No new medications or therapies prescribed; continue current management.    4. Hypertension.  - Blood pressure slightly elevated today.  - Takes blood pressure medication at night to avoid fatigue during the day.  - No changes to medication regimen necessary at this time.  - Monitoring blood pressure readings at home.    5. Sleep disturbance.  - Reports difficulty sleeping, often waking up at 3 AM to urinate and then again at 5 AM.  - Maintains high water intake throughout the day.  - No new medications or therapies prescribed; continue current management.  - Monitoring sleep patterns and

## 2025-07-20 ASSESSMENT — PATIENT HEALTH QUESTIONNAIRE - PHQ9
7. TROUBLE CONCENTRATING ON THINGS, SUCH AS READING THE NEWSPAPER OR WATCHING TELEVISION: NOT AT ALL
10. IF YOU CHECKED OFF ANY PROBLEMS, HOW DIFFICULT HAVE THESE PROBLEMS MADE IT FOR YOU TO DO YOUR WORK, TAKE CARE OF THINGS AT HOME, OR GET ALONG WITH OTHER PEOPLE: NOT DIFFICULT AT ALL
SUM OF ALL RESPONSES TO PHQ QUESTIONS 1-9: 1
SUM OF ALL RESPONSES TO PHQ QUESTIONS 1-9: 1
9. THOUGHTS THAT YOU WOULD BE BETTER OFF DEAD, OR OF HURTING YOURSELF: NOT AT ALL
6. FEELING BAD ABOUT YOURSELF - OR THAT YOU ARE A FAILURE OR HAVE LET YOURSELF OR YOUR FAMILY DOWN: NOT AT ALL
4. FEELING TIRED OR HAVING LITTLE ENERGY: NOT AT ALL
3. TROUBLE FALLING OR STAYING ASLEEP: SEVERAL DAYS
5. POOR APPETITE OR OVEREATING: NOT AT ALL
1. LITTLE INTEREST OR PLEASURE IN DOING THINGS: NOT AT ALL
2. FEELING DOWN, DEPRESSED OR HOPELESS: NOT AT ALL
SUM OF ALL RESPONSES TO PHQ QUESTIONS 1-9: 1
SUM OF ALL RESPONSES TO PHQ QUESTIONS 1-9: 1
8. MOVING OR SPEAKING SO SLOWLY THAT OTHER PEOPLE COULD HAVE NOTICED. OR THE OPPOSITE, BEING SO FIGETY OR RESTLESS THAT YOU HAVE BEEN MOVING AROUND A LOT MORE THAN USUAL: NOT AT ALL

## 2025-07-31 DIAGNOSIS — Z00.00 EXAMINATION, MEDICAL, GENERAL: ICD-10-CM

## 2025-08-01 DIAGNOSIS — C73 THYROID CANCER (HCC): ICD-10-CM

## 2025-08-01 DIAGNOSIS — E89.0 HYPOTHYROIDISM, POSTSURGICAL: ICD-10-CM

## 2025-08-01 DIAGNOSIS — R79.89 ELEVATED LIVER FUNCTION TESTS: ICD-10-CM

## 2025-08-01 DIAGNOSIS — Z00.00 EXAMINATION, MEDICAL, GENERAL: ICD-10-CM

## 2025-08-01 LAB
ALBUMIN SERPL-MCNC: 4.8 G/DL (ref 3.4–5)
ALBUMIN/GLOB SERPL: 2.2 {RATIO} (ref 1.1–2.2)
ALP SERPL-CCNC: 114 U/L (ref 40–129)
ALT SERPL-CCNC: 115 U/L (ref 10–40)
ANION GAP SERPL CALCULATED.3IONS-SCNC: 13 MMOL/L (ref 3–16)
AST SERPL-CCNC: 106 U/L (ref 15–37)
BILIRUB SERPL-MCNC: 0.5 MG/DL (ref 0–1)
BUN SERPL-MCNC: 12 MG/DL (ref 7–20)
CALCIUM SERPL-MCNC: 9.6 MG/DL (ref 8.3–10.6)
CHLORIDE SERPL-SCNC: 100 MMOL/L (ref 99–110)
CHOLEST SERPL-MCNC: 244 MG/DL (ref 0–199)
CHOLEST SERPL-MCNC: 266 MG/DL (ref 0–199)
CO2 SERPL-SCNC: 28 MMOL/L (ref 21–32)
CREAT SERPL-MCNC: 0.7 MG/DL (ref 0.6–1.2)
GFR SERPLBLD CREATININE-BSD FMLA CKD-EPI: >90 ML/MIN/{1.73_M2}
GLUCOSE SERPL-MCNC: 106 MG/DL (ref 70–99)
GLUCOSE SERPL-MCNC: 120 MG/DL (ref 70–99)
HDLC SERPL-MCNC: 108 MG/DL (ref 40–60)
HDLC SERPL-MCNC: 111 MG/DL (ref 40–60)
LDL CHOLESTEROL: 118 MG/DL
LDLC SERPL CALC-MCNC: 136 MG/DL
POTASSIUM SERPL-SCNC: 4.2 MMOL/L (ref 3.5–5.1)
PROT SERPL-MCNC: 7 G/DL (ref 6.4–8.2)
SODIUM SERPL-SCNC: 141 MMOL/L (ref 136–145)
T3FREE SERPL-MCNC: 2.2 PG/ML (ref 2.3–4.2)
T4 FREE SERPL-MCNC: 1.3 NG/DL (ref 0.9–1.8)
THYROGLOB AB SERPL IA-ACNC: <15 IU/ML
TRIGL SERPL-MCNC: 91 MG/DL (ref 0–150)
TRIGL SERPL-MCNC: 93 MG/DL (ref 0–150)
TSH SERPL DL<=0.005 MIU/L-ACNC: 3.44 UIU/ML (ref 0.27–4.2)
VLDLC SERPL CALC-MCNC: 18 MG/DL

## 2025-08-05 ENCOUNTER — OFFICE VISIT (OUTPATIENT)
Dept: ENDOCRINOLOGY | Age: 65
End: 2025-08-05
Payer: COMMERCIAL

## 2025-08-05 VITALS
WEIGHT: 200 LBS | SYSTOLIC BLOOD PRESSURE: 138 MMHG | DIASTOLIC BLOOD PRESSURE: 82 MMHG | TEMPERATURE: 98 F | BODY MASS INDEX: 34.15 KG/M2 | RESPIRATION RATE: 14 BRPM | OXYGEN SATURATION: 97 % | HEIGHT: 64 IN | HEART RATE: 85 BPM

## 2025-08-05 DIAGNOSIS — C73 THYROID CANCER (HCC): ICD-10-CM

## 2025-08-05 DIAGNOSIS — E66.811 CLASS 1 OBESITY WITH BODY MASS INDEX (BMI) OF 33.0 TO 33.9 IN ADULT, UNSPECIFIED OBESITY TYPE, UNSPECIFIED WHETHER SERIOUS COMORBIDITY PRESENT: ICD-10-CM

## 2025-08-05 DIAGNOSIS — R79.89 ELEVATED LIVER FUNCTION TESTS: ICD-10-CM

## 2025-08-05 DIAGNOSIS — Z78.0 MENOPAUSE: ICD-10-CM

## 2025-08-05 DIAGNOSIS — E89.0 HYPOTHYROIDISM, POSTSURGICAL: Primary | ICD-10-CM

## 2025-08-05 PROCEDURE — 99214 OFFICE O/P EST MOD 30 MIN: CPT | Performed by: INTERNAL MEDICINE

## 2025-08-05 PROCEDURE — 3075F SYST BP GE 130 - 139MM HG: CPT | Performed by: INTERNAL MEDICINE

## 2025-08-05 PROCEDURE — 3079F DIAST BP 80-89 MM HG: CPT | Performed by: INTERNAL MEDICINE

## 2025-08-05 RX ORDER — LEVOTHYROXINE SODIUM 175 UG/1
TABLET ORAL
Qty: 100 TABLET | Refills: 1 | Status: SHIPPED | OUTPATIENT
Start: 2025-08-05

## 2025-08-07 ENCOUNTER — TELEPHONE (OUTPATIENT)
Dept: PRIMARY CARE CLINIC | Age: 65
End: 2025-08-07

## 2025-08-07 LAB — THYROGLOB SERPL-MCNC: <0.5 NG/ML (ref 1.3–31.8)

## 2025-08-10 DIAGNOSIS — K21.9 GASTROESOPHAGEAL REFLUX DISEASE WITHOUT ESOPHAGITIS: ICD-10-CM

## 2025-08-11 RX ORDER — OMEPRAZOLE 40 MG/1
40 CAPSULE, DELAYED RELEASE ORAL DAILY
Qty: 90 CAPSULE | Refills: 1 | Status: SHIPPED | OUTPATIENT
Start: 2025-08-11

## 2025-08-14 ENCOUNTER — ENROLLMENT (OUTPATIENT)
Dept: PHARMACY | Age: 65
End: 2025-08-14

## 2025-08-14 ENCOUNTER — TELEPHONE (OUTPATIENT)
Dept: PHARMACY | Age: 65
End: 2025-08-14

## 2025-08-14 ENCOUNTER — TELEPHONE (OUTPATIENT)
Age: 65
End: 2025-08-14

## 2025-08-14 DIAGNOSIS — J45.50 SEVERE PERSISTENT ASTHMA WITHOUT COMPLICATION (HCC): Primary | ICD-10-CM

## 2025-08-14 DIAGNOSIS — R06.02 SOB (SHORTNESS OF BREATH): ICD-10-CM

## 2025-08-15 ENCOUNTER — TELEPHONE (OUTPATIENT)
Dept: PHARMACY | Age: 65
End: 2025-08-15

## 2025-08-15 RX ORDER — LEVALBUTEROL TARTRATE 45 UG/1
2 AEROSOL, METERED ORAL EVERY 4 HOURS PRN
Qty: 2 EACH | Refills: 11 | Status: SHIPPED | OUTPATIENT
Start: 2025-08-15 | End: 2026-08-15

## 2025-08-15 RX ORDER — FLUTICASONE FUROATE, UMECLIDINIUM BROMIDE AND VILANTEROL TRIFENATATE 200; 62.5; 25 UG/1; UG/1; UG/1
1 POWDER RESPIRATORY (INHALATION) DAILY
Qty: 3 EACH | Refills: 3 | Status: SHIPPED | OUTPATIENT
Start: 2025-08-15

## 2025-08-18 ENCOUNTER — PHARMACY VISIT (OUTPATIENT)
Dept: PHARMACY | Age: 65
End: 2025-08-18

## 2025-08-18 DIAGNOSIS — M06.9 RHEUMATOID ARTHRITIS, INVOLVING UNSPECIFIED SITE, UNSPECIFIED WHETHER RHEUMATOID FACTOR PRESENT (HCC): Primary | ICD-10-CM

## 2025-08-18 ASSESSMENT — PROMIS GLOBAL HEALTH SCALE
SUM OF RESPONSES TO QUESTIONS 2, 4, 5, & 10: 19
IN GENERAL, WOULD YOU SAY YOUR HEALTH IS...[ON A SCALE OF 1 (POOR) TO 5 (EXCELLENT)]: GOOD
IN GENERAL, HOW WOULD YOU RATE YOUR MENTAL HEALTH, INCLUDING YOUR MOOD AND YOUR ABILITY TO THINK [ON A SCALE OF 1 (POOR) TO 5 (EXCELLENT)]?: EXCELLENT
WHO IS THE PERSON COMPLETING THE PROMIS V1.1 SURVEY?: SELF
IN THE PAST 7 DAYS, HOW WOULD YOU RATE YOUR PAIN ON AVERAGE [ON A SCALE FROM 0 (NO PAIN) TO 10 (WORST IMAGINABLE PAIN)]?: 6
HOW IS THE PROMIS V1.1 BEING ADMINISTERED?: TELEPHONE
IN GENERAL, HOW WOULD YOU RATE YOUR PHYSICAL HEALTH [ON A SCALE OF 1 (POOR) TO 5 (EXCELLENT)]?: GOOD
IN THE PAST 7 DAYS, HOW OFTEN HAVE YOU BEEN BOTHERED BY EMOTIONAL PROBLEMS, SUCH AS FEELING ANXIOUS, DEPRESSED, OR IRRITABLE [ON A SCALE FROM 1 (NEVER) TO 5 (ALWAYS)]?: NEVER
IN GENERAL, WOULD YOU SAY YOUR QUALITY OF LIFE IS...[ON A SCALE OF 1 (POOR) TO 5 (EXCELLENT)]: VERY GOOD
TO WHAT EXTENT ARE YOU ABLE TO CARRY OUT YOUR EVERYDAY PHYSICAL ACTIVITIES SUCH AS WALKING, CLIMBING STAIRS, CARRYING GROCERIES, OR MOVING A CHAIR [ON A SCALE OF 1 (NOT AT ALL) TO 5 (COMPLETELY)]?: COMPLETELY
IN GENERAL, HOW WOULD YOU RATE YOUR SATISFACTION WITH YOUR SOCIAL ACTIVITIES AND RELATIONSHIPS [ON A SCALE OF 1 (POOR) TO 5 (EXCELLENT)]?: EXCELLENT
IN THE PAST 7 DAYS, HOW WOULD YOU RATE YOUR FATIGUE ON AVERAGE [ON A SCALE FROM 1 (NONE) TO 5 (VERY SEVERE)]?: MODERATE
IN GENERAL, PLEASE RATE HOW WELL YOU CARRY OUT YOUR USUAL SOCIAL ACTIVITIES (INCLUDES ACTIVITIES AT HOME, AT WORK, AND IN YOUR COMMUNITY, AND RESPONSIBILITIES AS A PARENT, CHILD, SPOUSE, EMPLOYEE, FRIEND, ETC) [ON A SCALE OF 1 (POOR) TO 5 (EXCELLENT)]?: EXCELLENT
SUM OF RESPONSES TO QUESTIONS 3, 6, 7, & 8: 17

## 2025-08-18 ASSESSMENT — ROUTINE ASSESSMENT OF PATIENT INDEX DATA (RAPID3)
TOTAL RAPID3 SCORE: 16.7
ON A SCALE OF ONE TO TEN, CONSIDERING ALL THE WAYS IN WHICH ILLNESS AND HEALTH CONDITIONS MAY AFFECT YOU AT THIS TIME, PLEASE INDICATE BELOW HOW YOU ARE DOING:: 6
ON A SCALE OF ONE TO TEN, HOW DIFFICULT WAS IT FOR YOU TO COMPLETE THE LISTED DAILY PHYSICAL TASKS OVER THE LAST WEEK: 5.57
TOTAL RAPID3 SCORE: 12
ON A SCALE OF ONE TO TEN, HOW MUCH PAIN HAVE YOU HAD BECAUSE OF YOUR CONDITION OVER THE PAST WEEK?: 6

## 2025-08-19 ENCOUNTER — PHARMACY VISIT (OUTPATIENT)
Dept: PHARMACY | Age: 65
End: 2025-08-19

## 2025-08-19 DIAGNOSIS — M06.9 RHEUMATOID ARTHRITIS INVOLVING MULTIPLE SITES, UNSPECIFIED WHETHER RHEUMATOID FACTOR PRESENT (HCC): Primary | ICD-10-CM

## 2025-08-20 PROBLEM — M06.9 RHEUMATOID ARTHRITIS (HCC): Status: ACTIVE | Noted: 2025-08-20

## 2025-08-25 ENCOUNTER — PATIENT MESSAGE (OUTPATIENT)
Dept: PRIMARY CARE CLINIC | Age: 65
End: 2025-08-25

## 2025-08-25 DIAGNOSIS — Z78.0 POST-MENOPAUSAL: Primary | ICD-10-CM

## (undated) DEVICE — SPONGE LAP W18XL18IN WHT COT 4 PLY FLD STRUNG RADPQ DISP ST 2 PER PACK

## (undated) DEVICE — YANKAUER,BULB TIP,W/O VENT,RIGID,STERILE: Brand: MEDLINE

## (undated) DEVICE — BASIC SINGLE BASIN 1-LF: Brand: MEDLINE INDUSTRIES, INC.

## (undated) DEVICE — SNARE COLD DIAMOND 10MM THIN

## (undated) DEVICE — SET EXTN PRIMING 4.9ML L30IN INCL SLDE CLMP SPIN M LUERLOCK

## (undated) DEVICE — 3M™ TEGADERM™ TRANSPARENT FILM DRESSING FRAME STYLE, 1627, 4 IN X 10 IN (10 CM X 25 CM), 20/CT 4CT/CASE: Brand: 3M™ TEGADERM™

## (undated) DEVICE — ELECTRODE PT RET AD L9FT HI MOIST COND ADH HYDRGEL CORDED

## (undated) DEVICE — CANNULA SAMP CO2 AD GRN 7FT CO2 AND 7FT O2 TBNG UNIV CONN

## (undated) DEVICE — Device: Brand: MEDEX

## (undated) DEVICE — SUTURE VICRYL + ABSORBABLE BRAIDED 3-0 12X18 IN COAT UD VCP110G

## (undated) DEVICE — 3.5 MM FULL RADIUS ELITE STRAIGHT                                    DISPOSABLE BLADES, BEIGE,PACKAGED 6                                    PER BOX, STERILE

## (undated) DEVICE — STRAP POS W5XL72IN FOAM KNEE AND BODY HK LOOP CLSR DISP

## (undated) DEVICE — MASTISOL ADHESIVE LIQ 2/3ML

## (undated) DEVICE — PAD,NON-ADHERENT,3X8,STERILE,LF,1/PK: Brand: MEDLINE

## (undated) DEVICE — SUTURE VICRYL + SZ 2-0 L27IN ABSRB WHT SH 1/2 CIR TAPERCUT VCP417H

## (undated) DEVICE — DEVON TUBE HOLDER REMOVABLE TOUCH FASTEN STRAP: Brand: DEVON

## (undated) DEVICE — SUTURE VICRYL + SZ 3-0 L18IN ABSRB UD SH 1/2 CIR TAPERCUT NDL VCP864D

## (undated) DEVICE — BLADE SHAVER AGGRESSIVE + 4 MM RAZOR SHRP TOOTH RED SYNOVIUM

## (undated) DEVICE — SUTURE MONOCRYL SZ 3 0 L18IN ABSRB UD PS 2 3 8 CIR REV CUT NDL MCP497G

## (undated) DEVICE — STAPLER SKIN H3.9MM WIRE DIA0.58MM CRWN 6.9MM 35 STPL ROT

## (undated) DEVICE — TUBING, SUCTION, 1/4" X 10', STRAIGHT: Brand: MEDLINE

## (undated) DEVICE — SYRINGE MED GEL ORISE SUBMUCOSAL LIFTING AGENT WITH NDL

## (undated) DEVICE — MERCY HEALTH WEST TURNOVER: Brand: MEDLINE INDUSTRIES, INC.

## (undated) DEVICE — SHEET,DRAPE,53X77,STERILE: Brand: MEDLINE

## (undated) DEVICE — PAD ABSRB W8XL10IN ABD HYDROPHOBIC NONWOVEN THCK LAYR CELOS

## (undated) DEVICE — LEGGINGS, PAIR, 31X48, STERILE: Brand: MEDLINE

## (undated) DEVICE — NEEDLE SPNL L3.5IN PNK HUB S STL REG WALL FIT STYL W/ QNCKE

## (undated) DEVICE — CLIP LIG L235CM RESOL 360 BX/20

## (undated) DEVICE — TRAP SPEC RETRV CLR PLAS POLYP IN LN SUCT QUIK CTCH

## (undated) DEVICE — 4-PORT MANIFOLD: Brand: NEPTUNE 2

## (undated) DEVICE — [FOUR SPIKE IRRIGATOR SET,  NON-PYROGENIC FLUID PATH,  DO NOT USE IF PACKAGE IS DAMAGED]

## (undated) DEVICE — Device

## (undated) DEVICE — PAD,ABDOMINAL,8"X7.5",STERILE,LF,1/PK: Brand: MEDLINE

## (undated) DEVICE — GLOVE ORANGE PI 8 1/2   MSG9085

## (undated) DEVICE — ZIMMER® STERILE DISPOSABLE TOURNIQUET CUFF WITH PLC, DUAL PORT, SINGLE BLADDER, 34 IN. (86 CM)

## (undated) DEVICE — SOLUTION IRRIG 500ML 0.9% SOD CHLO USP POUR PLAS BTL

## (undated) DEVICE — 3M™ STERI-STRIP™ REINFORCED ADHESIVE SKIN CLOSURES, R1547, 1/2 IN X 4 IN (12 MM X 100 MM), 6 STRIPS/ENVELOPE: Brand: 3M™ STERI-STRIP™

## (undated) DEVICE — PENCIL SMK EVAC TELSCP 3 M TBNG

## (undated) DEVICE — TRAP POLYP ETRAP

## (undated) DEVICE — INTENDED FOR TISSUE SEPARATION, AND OTHER PROCEDURES THAT REQUIRE A SHARP SURGICAL BLADE TO PUNCTURE OR CUT.: Brand: BARD-PARKER ® STAINLESS STEEL BLADES

## (undated) DEVICE — APPLICATOR MEDICATED 26 CC SOLUTION HI LT ORNG CHLORAPREP

## (undated) DEVICE — SNARE COLD DIAMOND 15MM THIN

## (undated) DEVICE — PENCIL ES L3M BTTN SWCH S STL HEX LOK BLDE ELECTRD HOLSTER

## (undated) DEVICE — GLOVE ORANGE PI 7 1/2   MSG9075

## (undated) DEVICE — LIGHT HANDLE: Brand: DEVON

## (undated) DEVICE — SUTURE MONOCRYL + SZ 4-0 L18IN ABSRB UD L19MM PS-2 3/8 CIR MCP496G

## (undated) DEVICE — COVER,MAYO STAND,XL,STERILE: Brand: MEDLINE

## (undated) DEVICE — GLOVE SURG SZ 65 THK91MIL LTX FREE SYN POLYISOPRENE

## (undated) DEVICE — GOWN,AURORA,NONREINF,RAGLAN,XXL,STERILE: Brand: MEDLINE

## (undated) DEVICE — COVER,MAYO STAND,STERILE: Brand: MEDLINE